# Patient Record
Sex: FEMALE | Race: WHITE | NOT HISPANIC OR LATINO | Employment: PART TIME | URBAN - METROPOLITAN AREA
[De-identification: names, ages, dates, MRNs, and addresses within clinical notes are randomized per-mention and may not be internally consistent; named-entity substitution may affect disease eponyms.]

---

## 2017-03-15 ENCOUNTER — HOSPITAL ENCOUNTER (OUTPATIENT)
Facility: MEDICAL CENTER | Age: 62
End: 2017-03-15
Attending: NURSE PRACTITIONER
Payer: COMMERCIAL

## 2017-03-15 LAB
C DIFF DNA SPEC QL NAA+PROBE: NEGATIVE
C DIFF TOX GENS STL QL NAA+PROBE: NEGATIVE

## 2017-03-15 PROCEDURE — 87045 FECES CULTURE AEROBIC BACT: CPT

## 2017-03-15 PROCEDURE — 87493 C DIFF AMPLIFIED PROBE: CPT

## 2017-03-15 PROCEDURE — 87899 AGENT NOS ASSAY W/OPTIC: CPT

## 2017-03-15 PROCEDURE — 87046 STOOL CULTR AEROBIC BACT EA: CPT

## 2017-03-15 RX ORDER — LEVOTHYROXINE SODIUM 0.12 MG/1
TABLET ORAL
Qty: 90 TAB | Refills: 0 | Status: SHIPPED | OUTPATIENT
Start: 2017-03-15 | End: 2017-06-09 | Stop reason: SDUPTHER

## 2017-03-16 LAB
E COLI SXT1+2 STL IA: NORMAL
SIGNIFICANT IND 70042: NORMAL
SOURCE SOURCE: NORMAL

## 2017-03-18 LAB
BACTERIA STL CULT: NORMAL
E COLI SXT1+2 STL IA: NORMAL
SIGNIFICANT IND 70042: NORMAL
SOURCE SOURCE: NORMAL

## 2017-03-29 ENCOUNTER — TELEPHONE (OUTPATIENT)
Dept: MEDICAL GROUP | Facility: MEDICAL CENTER | Age: 62
End: 2017-03-29

## 2017-03-29 DIAGNOSIS — Z13.21 ENCOUNTER FOR VITAMIN DEFICIENCY SCREENING: ICD-10-CM

## 2017-03-29 DIAGNOSIS — I10 ESSENTIAL HYPERTENSION: Chronic | ICD-10-CM

## 2017-03-29 DIAGNOSIS — E03.9 ACQUIRED HYPOTHYROIDISM: Chronic | ICD-10-CM

## 2017-03-29 DIAGNOSIS — E78.5 DYSLIPIDEMIA: Chronic | ICD-10-CM

## 2017-03-29 NOTE — Clinical Note
April 3, 2017        Elizabeth Nuñez  [address]  [facility] [address]        Dear Elizabeth:    Andie Carmichael's office has tried contacting you. At your earliest convenience please contact our office at [phone].      If you have any questions or concerns, please don't hesitate to call.        Sincerely,        Andie Carmichael, AMAURY.P.KOBI.    Electronically Signed

## 2017-03-29 NOTE — TELEPHONE ENCOUNTER
He didn't say that from his note in jan.  Has she seen him more recently?  Zocor doesn't usually effect colitis that i am aware of?  Was she having liver issues?

## 2017-03-29 NOTE — TELEPHONE ENCOUNTER
1. Caller Name: Pt                      Call Back Number: [phone] (home)     2. Message: Pt left message stating she was diagnosed with Lymphocytic Colitis and was told by GI doctor to discontinue Zocor. Is there something else she can take? Should she make an appt?      3. Patient approves office to leave a detailed voicemail/MyChart message: N\A

## 2017-03-30 NOTE — TELEPHONE ENCOUNTER
agev pt info-pt states she had colonoscopy done 3- and she saw dr. higginbotham at [facility] and he indicated that this was one medication that could cause colitis

## 2017-06-09 RX ORDER — LEVOTHYROXINE SODIUM 0.12 MG/1
TABLET ORAL
Qty: 30 TAB | Refills: 0 | Status: SHIPPED | OUTPATIENT
Start: 2017-06-09 | End: 2017-07-13

## 2017-06-09 NOTE — Clinical Note
June 12, 2017        Elizabeth Nuñez  [address]  [facility] [address]        Dear Elizabeth:    We have received a request from your pharmacy to refill your prescription(s). After careful review of your chart, we have noted you are due for labs and a follow up appointment.  We request you call our office at [phone] at your earliest convenience and make an appointment. I have included a fasting lab order for you.    However, when patients are not followed closely by their physician, potential medication complications may go unadressed. We look forward to scheduling an appointment for you, so that we may provide you with the safest and most complete medical care.        If you have any questions or concerns, please don't hesitate to call.        Sincerely,        RHEA Cruz.    Electronically Signed

## 2017-07-13 ENCOUNTER — OFFICE VISIT (OUTPATIENT)
Dept: MEDICAL GROUP | Facility: MEDICAL CENTER | Age: 62
End: 2017-07-13
Payer: COMMERCIAL

## 2017-07-13 VITALS
SYSTOLIC BLOOD PRESSURE: 120 MMHG | DIASTOLIC BLOOD PRESSURE: 78 MMHG | BODY MASS INDEX: 36.99 KG/M2 | TEMPERATURE: 96.5 F | WEIGHT: 222 LBS | HEIGHT: 65 IN | OXYGEN SATURATION: 96 % | HEART RATE: 74 BPM

## 2017-07-13 DIAGNOSIS — Z00.00 PHYSICAL EXAM, ANNUAL: ICD-10-CM

## 2017-07-13 DIAGNOSIS — E66.9 OBESITY (BMI 30-39.9): ICD-10-CM

## 2017-07-13 DIAGNOSIS — E78.5 HYPERLIPIDEMIA LDL GOAL <100: ICD-10-CM

## 2017-07-13 DIAGNOSIS — B02.8 HERPES ZOSTER WITH COMPLICATION: ICD-10-CM

## 2017-07-13 PROCEDURE — 99396 PREV VISIT EST AGE 40-64: CPT | Performed by: NURSE PRACTITIONER

## 2017-07-13 RX ORDER — ACYCLOVIR 400 MG/1
400 TABLET ORAL 2 TIMES DAILY
Qty: 180 TAB | Refills: 1 | Status: SHIPPED | OUTPATIENT
Start: 2017-07-13 | End: 2018-01-15 | Stop reason: SDUPTHER

## 2017-07-13 RX ORDER — CHOLESTYRAMINE LIGHT 4 G/5.7G
POWDER, FOR SUSPENSION ORAL 2 TIMES DAILY
COMMUNITY
End: 2017-07-19

## 2017-07-13 RX ORDER — CHOLESTYRAMINE 4 G/9G
1 POWDER, FOR SUSPENSION ORAL
Qty: 30 EACH | Refills: 0
Start: 2017-07-13 | End: 2017-11-29

## 2017-07-13 ASSESSMENT — PATIENT HEALTH QUESTIONNAIRE - PHQ9: CLINICAL INTERPRETATION OF PHQ2 SCORE: 0

## 2017-07-13 NOTE — PROGRESS NOTES
Subjective:      Elizabeth Nuñez is a 62 y.o. female who presents with No chief complaint on file.            HPI  Seen in f/u for PE.  She was having diarrhea.  Saw [facility] and had colonoscopy in march.  Was dx with lymphcytic microscopic colitis.  She was taken off zocor and change to cholestyramine.  She is doing cholestyramine 3x/wk.  Also was stopped HRT and ibuprofen.  She did lab but we dont have results. She did them on 6/29/17 but no lab has them.    She is due a mammo in nov.  Will call for order in oct.    She has had a syncope in past.  Went to ENT.  They thought it was a viral infeciton.  Pt thought it was shingles.  She had lesions on rt ear.  Went to neuro and they conferred shingles.  Told her to have vaccine.  She hasn't been able to get it d/t continuously reoccurring rash from shingles.  That is causing tinnitus and hearing loss norma.       Patient Active Problem List    Diagnosis Date Noted   • Obesity (BMI 30-39.9) 07/13/2017   • Allergy to pollen    • Hypertension 02/22/2012   • Syncope 05/20/2010   • Hypothyroid 04/22/2009   • Pulmonary nodule 04/22/2009   • Dyslipidemia 04/22/2009   • Pruritic dermatitis 04/22/2009   • History of diverticulitis of colon 04/22/2009   • Carpal tunnel syndrome 04/22/2009   • Adenomatous polyp of colon 04/22/2009   • Preventative health care 04/22/2009   • GERD (gastroesophageal reflux disease) 04/22/2009   • Diverticulitis 04/22/2009     Current Outpatient Prescriptions   Medication Sig Dispense Refill   • cholestyramine (QUESTRAN,PREVALITE) 4 GM Pack Take  by mouth 2 times a day.     • simvastatin (ZOCOR) 10 MG Tab Take 1 Tab by mouth every bedtime. TAKE 1 TAB BY MOUTH EVERY EVENING. 90 Tab 3   • lisinopril (PRINIVIL) 10 MG Tab Take 1 Tab by mouth 2 times a day. TAKE 1 TAB BY MOUTH 2 TIMES A DAY. 180 Tab 3   • estradiol (ESTRACE) 0.1 MG/GM vaginal cream Insert 0.1 g in vagina Every Sunday and Wednesday. 1 Tube 0   • Omega-3 Fatty Acids (FISH OIL) 1000 MG Cap  capsule Take 1 Cap by mouth every day. 30 Cap 0   • Probiotic Product (PROBIOTIC DAILY) Cap Take 1 Cap by mouth every day. 30 Cap 0   • B Complex Cap Take 1 Cap by mouth every day. 30 Cap 0   • levothyroxine (SYNTHROID) 125 MCG TABS Take 1 Tab by mouth every day. 90 Tab 1   • Cholecalciferol (VITAMIN D) 1000 UNIT CAPS Take 2 Caps by mouth every day.     • Psyllium (METAMUCIL) 0.52 GM CAPS Take 1 Cap by mouth every day.     • ibuprofen (MOTRIN) 200 MG TABS Take 600 mg by mouth every 6 hours as needed for Mild Pain.     • Calcium Carbonate (CALCIUM 500 PO) Take 2 Tabs by mouth every day.     • OMEPRAZOLE 20 MG PO CPDR Take 1 Cap by mouth every day.  11     No current facility-administered medications for this visit.     Allergies   Allergen Reactions   • Morphine Itching   • Sulfa Drugs    • Sulfa Drugs Itching       ROS  Review of Systems   Constitutional: Negative.  Negative for fever, chills, weight loss, malaise/fatigue and diaphoresis.   HENT: Negative.  Negative for congestion, sore throat, neck pain, ear discharge.    Eyes: Negative.  Negative for blurred vision, double vision, photophobia, pain, discharge and redness.   Respiratory: Negative.  Negative for cough, hemoptysis, sputum production, shortness of breath, wheezing and stridor.    Cardiovascular: Negative.  Negative for chest pain, palpitations, orthopnea, claudication, leg swelling and PND.   Gastrointestinal: Negative.  Negative for vomiting, abdominal pain, constipation, blood in stool and melena.   Genitourinary: Negative.  Negative for dysuria, urgency, incontinence, hematuria and flank pain.  chronic urinary freq  Musculoskeletal: Negative.  Negative for myalgias, back pain, joint pain and falls.    Neurological: Negative.  Negative for dizziness, tingling, tremors, sensory change, speech change, focal weakness, seizures, loss of consciousness, weakness and headaches.   Endo/Heme/Allergies: Negative.  Negative for polydipsia. Does not  "bruise/bleed easily.   Psychiatric/Behavioral: Negative.  Negative for depression, suicidal ideas, hallucinations, memory loss and substance abuse. The patient is not nervous/anxious and does not have insomnia.    All other systems reviewed and are negative.           Objective:     /78 mmHg  Pulse 74  Temp(Src) 35.8 °C (96.5 °F)  Ht 1.651 m (5' 5\")  Wt 100.699 kg (222 lb)  BMI 36.94 kg/m2  SpO2 96%  LMP 08/11/2010  Breastfeeding? No     Physical Exam  Physical Exam   Vitals reviewed.  Constitutional: oriented to person, place, and time. appears well-developed and well-nourished. No distress.   HENT: Head: Normocephalic and atraumatic. Bilateral tympanic membranes wnl w/o bulging.  Right Ear: External ear normal. Left Ear: External ear normal. Nose: Nose normal.  Mouth/Throat: Oropharynx is clear and moist. No oropharyngeal exudate. norma tm wnl. Eyes: Conjunctivae and EOM are normal. Pupils are equal, round, and reactive to light. Right eye exhibits no discharge. Left eye exhibits no discharge. No scleral icterus.    Neck: Normal range of motion. Neck supple. No JVD present.   Cardiovascular: Normal rate, regular rhythm, normal heart sounds and intact distal pulses.  Exam reveals no gallop and no friction rub.  No murmur heard.  No carotid bruits   Pulmonary/Chest: Effort normal and breath sounds normal. No stridor. No respiratory distress. no wheezes or rales. exhibits no tenderness.   Abdominal: Soft. Bowel sounds are normal. exhibits no distension and no mass. No tenderness. no rebound and no guarding.   Musculoskeletal: Normal range of motion. exhibits no edema or tenderness.  norma pedal pulses 2+.  Lymphadenopathy:  no cervical or supraclavicular adenopathy.   Neurological: alert and oriented to person, place, and time. has normal reflexes. displays normal reflexes. No cranial nerve deficit. exhibits normal muscle tone. Coordination normal.   Skin: Skin is warm and dry. No rash noted. no " diaphoresis. No erythema. No pallor.   Psychiatric: normal mood and affect. behavior is normal.               Assessment/Plan:     1. Physical exam, annual     2. Hyperlipidemia LDL goal <100  cholestyramine (QUESTRAN) 4 G packet    off zocor.  will consider restart another med but needs to do lab again since it was lost.  recheck lab and f/u with pt with results.  needs seen if abn   3. Herpes zoster with complication  acyclovir (ZOVIRAX) 400 MG tablet    take acyclovir bid for 3 months.  see if can get sx controlled then do zostavax.  otherwise f/u yrly   4. Obesity (BMI 30-39.9)  Patient identified as having weight management issue.  Appropriate orders and counseling given.

## 2017-07-13 NOTE — MR AVS SNAPSHOT
"        Elizabeth Solomon Joaquin   2017 10:00 AM   Office Visit   MRN: [id number]    Department:  [facility]   Dept Phone:  [phone]    Description:  Female : 1955   Provider:  BASILIO Cruz           Allergies as of 2017     Allergen Noted Reactions    Morphine 2009   Itching    Sulfa Drugs 2009       Sulfa Drugs 10/07/2009   Itching      You were diagnosed with     Physical exam, annual   [938697]       Obesity (BMI 30-39.9)   [992806]       Hyperlipidemia LDL goal <100   [201667]   off zocor.  will consider restart another med but needs to do lab again since it was lost.  recheck lab and f/u 1 month.     Herpes zoster with complication   [494532]   take acyclovir bid for 3 months.  see if can get sx controlled then do zostavax      Vital Signs     Blood Pressure Pulse Temperature Height Weight Body Mass Index    120/78 mmHg 74 35.8 °C (96.5 °F) 1.651 m (5' 5\") 100.699 kg (222 lb) 36.94 kg/m2    Oxygen Saturation Last Menstrual Period Breastfeeding? Smoking Status          96% 2010 No Never Smoker         Basic Information     Date Of Birth Sex Race Ethnicity Preferred Language    1955 Female White Non- English      Problem List              ICD-10-CM Priority Class Noted - Resolved    Hypothyroid (Chronic) E03.9   2009 - Present    Pulmonary nodule (Chronic) R91.1   2009 - Present    Dyslipidemia (Chronic) E78.5   2009 - Present    Pruritic dermatitis (Chronic) L29.9   2009 - Present    History of diverticulitis of colon (Chronic) Z87.19   2009 - Present    Carpal tunnel syndrome (Chronic) G56.00   2009 - Present    Adenomatous polyp of colon (Chronic) D12.6   2009 - Present    Preventative health care (Chronic) Z00.00   2009 - Present    GERD (gastroesophageal reflux disease) (Chronic) K21.9   2009 - Present    Syncope (Chronic) R55   2010 - Present    Hypertension (Chronic) I10   2012 - " Present    Allergy to pollen J30.1   Unknown - Present    Diverticulitis K57.92   4/22/2009 - Present    Obesity (BMI 30-39.9) E66.9   7/13/2017 - Present      Health Maintenance        Date Due Completion Dates    IMM ZOSTER VACCINE 6/14/2016 ---    COLONOSCOPY 2/28/2017 2/28/2012 (Done)    Override on 2/28/2012: Done    IMM INFLUENZA (1) 9/1/2017 9/28/2016, 10/15/2012, 10/20/2011    MAMMOGRAM 11/9/2017 11/9/2016, 10/27/2015, 10/22/2014, 11/1/2013, 10/23/2012, 10/20/2011, 9/30/2010, 4/20/2009, 4/20/2009, 4/18/2008, 4/18/2008, 4/5/2007, 3/9/2006, 1/4/2005    IMM DTaP/Tdap/Td Vaccine (2 - Td) 7/13/2019 7/13/2009            Current Immunizations     Influenza TIV (IM) 10/15/2012, 10/20/2011 10:44 AM    Influenza Vaccine Quad Inj (Pf) 9/28/2016    Tdap Vaccine 7/13/2009 11:10 AM    Tuberculin Skin Test 3/13/2013  6:30 AM, 3/19/2012  6:30 AM, 3/30/2011  6:39 AM      Below and/or attached are the medications your provider expects you to take. Review all of your home medications and newly ordered medications with your provider and/or pharmacist. Follow medication instructions as directed by your provider and/or pharmacist. Please keep your medication list with you and share with your provider. Update the information when medications are discontinued, doses are changed, or new medications (including over-the-counter products) are added; and carry medication information at all times in the event of emergency situations     Allergies:  MORPHINE - Itching     SULFA DRUGS - (reactions not documented)     SULFA DRUGS - Itching               Medications  Valid as of: July 13, 2017 - 11:03 AM    Generic Name Brand Name Tablet Size Instructions for use    Acyclovir (Tab) ZOVIRAX 400 MG Take 1 Tab by mouth 2 times a day.        B Complex Vitamins (Cap) B Complex  Take 1 Cap by mouth every day.        Calcium-Magnesium-Vitamin D   Take 2 Tabs by mouth every day.        Cholecalciferol (Cap) Vitamin D 1000 UNIT Take 2 Caps by mouth  every day.        Cholestyramine (Pack) QUESTRAN 4 G Take 4 g by mouth every 24 hours as needed.        Cholestyramine Light (Pack) QUESTRAN,PREVALITE 4 GM Take  by mouth 2 times a day.        Levothyroxine Sodium (Tab) SYNTHROID 125 MCG Take 1 Tab by mouth every day.        Lisinopril (Tab) PRINIVIL 10 MG Take 1 Tab by mouth 2 times a day. TAKE 1 TAB BY MOUTH 2 TIMES A DAY.        Omega-3 Fatty Acids (Cap) fish oil 1000 MG Take 1 Cap by mouth every day.        Omeprazole (CAPSULE DELAYED RELEASE) PRILOSEC 20 MG Take 1 Cap by mouth every day.        Psyllium (Cap) Psyllium 0.52 GM Take 1 Cap by mouth every day.        .                 Medicines prescribed today were sent to:     [facility]ARM[facility]Y #[id number] - [place] - [address]    [address] [facility] [address]    Phone: [phone] Fax: [phone]    Open 24 Hours?: No    [facility]ARM[facility]Y #[id number] - [place], [place] - [address]    [address] [facility] [address]    Phone: [phone] Fax: [phone]    Open 24 Hours?: No    [facility]Y #[id number] - [place], [place] - [address]    [address] [address]    Phone: [phone] Fax: [phone]    Open 24 Hours?: No      Medication refill instructions:       If your prescription bottle indicates you have medication refills left, it is not necessary to call your provider’s office. Please contact your pharmacy and they will refill your medication.    If your prescription bottle indicates you do not have any refills left, you may request refills at any time through one of the following ways: The online [facility] system (except Urgent Care), by calling your provider’s office, or by asking your pharmacy to contact your provider’s office with a refill request. Medication refills are processed only during regular business hours and may not be available until the next business day. Your provider may request additional information or to have a follow-up visit with you prior to  refilling your medication.   *Please Note: Medication refills are assigned a new Rx number when refilled electronically. Your pharmacy may indicate that no refills were authorized even though a new prescription for the same medication is available at the pharmacy. Please request the medicine by name with the pharmacy before contacting your provider for a refill.        Other Notes About Your Plan     12/09 last seen by me, sees moreno Rodriguez Access Code: Activation code not generated  Current [facility] Status: Active

## 2017-07-15 RX ORDER — LEVOTHYROXINE SODIUM 0.12 MG/1
TABLET ORAL
Qty: 30 TAB | Refills: 0 | Status: SHIPPED | OUTPATIENT
Start: 2017-07-15 | End: 2017-08-08 | Stop reason: SDUPTHER

## 2017-07-15 NOTE — TELEPHONE ENCOUNTER
Was the patient seen in the last year in this department? Yes     Does patient have an active prescription for medications requested? No     Received Request Via: Pharmacy     Last seen: 07/13/2017 Amol

## 2017-07-18 ENCOUNTER — TELEPHONE (OUTPATIENT)
Dept: MEDICAL GROUP | Facility: MEDICAL CENTER | Age: 62
End: 2017-07-18

## 2017-07-18 LAB
25(OH)D3+25(OH)D2 SERPL-MCNC: 41.5 NG/ML (ref 30–100)
ALBUMIN SERPL-MCNC: 4.2 G/DL (ref 3.6–4.8)
ALBUMIN/CREAT UR: <5.2 MG/G CREAT (ref 0–30)
ALBUMIN/GLOB SERPL: 1.6 {RATIO} (ref 1.2–2.2)
ALP SERPL-CCNC: 89 IU/L (ref 39–117)
ALT SERPL-CCNC: 27 IU/L (ref 0–32)
AST SERPL-CCNC: 21 IU/L (ref 0–40)
BILIRUB SERPL-MCNC: 0.5 MG/DL (ref 0–1.2)
BUN SERPL-MCNC: 15 MG/DL (ref 8–27)
BUN/CREAT SERPL: 22 (ref 12–28)
CALCIUM SERPL-MCNC: 9.3 MG/DL (ref 8.7–10.3)
CHLORIDE SERPL-SCNC: 101 MMOL/L (ref 96–106)
CHOLEST SERPL-MCNC: 209 MG/DL (ref 100–199)
CHOLEST/HDLC SERPL: 3.9 RATIO UNITS (ref 0–4.4)
CO2 SERPL-SCNC: 21 MMOL/L (ref 18–29)
COMMENT 011824: ABNORMAL
CREAT SERPL-MCNC: 0.68 MG/DL (ref 0.57–1)
CREAT UR-MCNC: 57.3 MG/DL
GLOBULIN SER CALC-MCNC: 2.6 G/DL (ref 1.5–4.5)
GLUCOSE SERPL-MCNC: 90 MG/DL (ref 65–99)
HDLC SERPL-MCNC: 54 MG/DL
LDLC SERPL CALC-MCNC: 136 MG/DL (ref 0–99)
MICROALBUMIN UR-MCNC: <3 UG/ML
POTASSIUM SERPL-SCNC: 4.6 MMOL/L (ref 3.5–5.2)
PROT SERPL-MCNC: 6.8 G/DL (ref 6–8.5)
SODIUM SERPL-SCNC: 141 MMOL/L (ref 134–144)
T4 FREE SERPL-MCNC: 1.62 NG/DL (ref 0.82–1.77)
TRIGL SERPL-MCNC: 95 MG/DL (ref 0–149)
TSH SERPL DL<=0.005 MIU/L-ACNC: 1.68 UIU/ML (ref 0.45–4.5)
VLDLC SERPL CALC-MCNC: 19 MG/DL (ref 5–40)

## 2017-07-18 NOTE — TELEPHONE ENCOUNTER
----- Message from BASILIO Cruz sent at 7/18/2017  1:26 PM PDT -----  Please have pt set appointment to review and discuss treatment for labs.

## 2017-07-19 ENCOUNTER — OFFICE VISIT (OUTPATIENT)
Dept: MEDICAL GROUP | Facility: MEDICAL CENTER | Age: 62
End: 2017-07-19
Payer: COMMERCIAL

## 2017-07-19 VITALS
TEMPERATURE: 97.1 F | DIASTOLIC BLOOD PRESSURE: 72 MMHG | HEIGHT: 65 IN | HEART RATE: 63 BPM | OXYGEN SATURATION: 96 % | WEIGHT: 222 LBS | SYSTOLIC BLOOD PRESSURE: 118 MMHG | BODY MASS INDEX: 36.99 KG/M2

## 2017-07-19 DIAGNOSIS — E78.5 HYPERLIPIDEMIA LDL GOAL <100: ICD-10-CM

## 2017-07-19 DIAGNOSIS — I10 ESSENTIAL HYPERTENSION: Chronic | ICD-10-CM

## 2017-07-19 PROCEDURE — 99214 OFFICE O/P EST MOD 30 MIN: CPT | Performed by: NURSE PRACTITIONER

## 2017-07-19 NOTE — PROGRESS NOTES
Subjective:      Elizabeth Nuñez is a 62 y.o. female who presents with No chief complaint on file.            HPI  Seen in f/u for HTN.  Her bp is well cotnrolled.  Stable on meds.  She developed colitis after a colonoscopy.  She used cholestyramine prn to tx sx.  No black tarry stools.  She was on zocor but Mina stopped it.  It could have caused the colitis.  He told her the other statins do not all cause it.  She is using the cholestryamine only as needed.   Reviewed lab with pt. Her CMP, GFR, D, alb/cr ratio, TSH, T4 is wnl  LP shows trg and HDL are at goal.  LDL is up from 104 to 136.  She doesn't eat many fatty foods.  She was in [place] and ate a lot of fried foods.  She was there for a month.  That was in june.        Patient Active Problem List    Diagnosis Date Noted   • Obesity (BMI 30-39.9) 07/13/2017   • Allergy to pollen    • Hypertension 02/22/2012   • Syncope 05/20/2010   • Hypothyroid 04/22/2009   • Pulmonary nodule 04/22/2009   • Dyslipidemia 04/22/2009   • Pruritic dermatitis 04/22/2009   • History of diverticulitis of colon 04/22/2009   • Carpal tunnel syndrome 04/22/2009   • Adenomatous polyp of colon 04/22/2009   • Preventative health care 04/22/2009   • GERD (gastroesophageal reflux disease) 04/22/2009   • Diverticulitis 04/22/2009     Current Outpatient Prescriptions   Medication Sig Dispense Refill   • levothyroxine (SYNTHROID) 125 MCG Tab TAKE 1 TABLET BY MOUTH EVERY DAY 30 Tab 0   • cholestyramine (QUESTRAN) 4 G packet Take 4 g by mouth every 24 hours as needed. 30 Each 0   • acyclovir (ZOVIRAX) 400 MG tablet Take 1 Tab by mouth 2 times a day. 180 Tab 1   • lisinopril (PRINIVIL) 10 MG Tab Take 1 Tab by mouth 2 times a day. TAKE 1 TAB BY MOUTH 2 TIMES A DAY. 180 Tab 3   • Omega-3 Fatty Acids (FISH OIL) 1000 MG Cap capsule Take 1 Cap by mouth every day. 30 Cap 0   • B Complex Cap Take 1 Cap by mouth every day. 30 Cap 0   • Cholecalciferol (VITAMIN D) 1000 UNIT CAPS Take 2 Caps by  "mouth every day.     • Psyllium (METAMUCIL) 0.52 GM CAPS Take 1 Cap by mouth every day.     • Calcium Carbonate (CALCIUM 500 PO) Take 2 Tabs by mouth every day.     • OMEPRAZOLE 20 MG PO CPDR Take 1 Cap by mouth every day.  11     No current facility-administered medications for this visit.     Allergies   Allergen Reactions   • Morphine Itching   • Sulfa Drugs    • Sulfa Drugs Itching       ROS  Review of Systems   Constitutional: Negative.  Negative for fever, chills, weight loss, malaise/fatigue and diaphoresis.   HENT: Negative.  Negative for hearing loss, ear pain, nosebleeds, congestion, sore throat, neck pain, tinnitus and ear discharge.    Respiratory: Negative.  Negative for cough, hemoptysis, sputum production, shortness of breath, wheezing and stridor.    Cardiovascular: Negative.  Negative for chest pain, palpitations, orthopnea, claudication, leg swelling and PND.   Gastrointestinal: denies nausea, vomiting, diarrhea, constipation, heartburn, melena or hematochezia.  Genitourinary: Denies dysuria, hematuria, urinary incontinence, frequency or urgency.             Objective:     /72 mmHg  Pulse 63  Temp(Src) 36.2 °C (97.1 °F)  Ht 1.651 m (5' 5\")  Wt 100.699 kg (222 lb)  BMI 36.94 kg/m2  SpO2 96%  LMP 08/11/2010  Breastfeeding? No     Physical Exam      Physical Exam   Vitals reviewed.  Constitutional: oriented to person, place, and time. appears well-developed and well-nourished. No distress.   Cardiovascular: Normal rate, regular rhythm, normal heart sounds and intact distal pulses.  Exam reveals no gallop and no friction rub.  No murmur heard.  No carotid bruits.   Pulmonary/Chest: Effort normal and breath sounds normal. No stridor. No respiratory distress. no wheezes or rales. exhibits no tenderness.   Musculoskeletal: Normal range of motion. exhibits no edema. norma pedal pulses 2+.  Neurological: alert and oriented to person, place, and time. exhibits normal muscle tone. Coordination " normal.   Skin: Skin is warm and dry. no diaphoresis.   Psychiatric: normal mood and affect. behavior is normal.            Assessment/Plan:       1. Essential hypertension      stable and controlled on med.  f/u yearly call for lab slip   2. Hyperlipidemia LDL goal <100      try taking cholestyramine daily or QOD.  recheck LP in 3 months. f/u with pt with results.  will only consider restart another statin if cholestyramine not work

## 2017-07-19 NOTE — MR AVS SNAPSHOT
"        Elizabeth Nuñez   2017 8:30 AM   Office Visit   MRN: [id number]    Department:  [facility]   Dept Phone:  [phone]    Description:  Female : 1955   Provider:  BASILIO Cruz           Allergies as of 2017     Allergen Noted Reactions    Morphine 2009   Itching    Sulfa Drugs 2009       Sulfa Drugs 10/07/2009   Itching      You were diagnosed with     Essential hypertension   [7359148]   stable and controlled on med.  f/u yearly call for lab slip    Hyperlipidemia LDL goal <100   [[id number]]   try taking cholestyramine daily or QOD.  recheck LP in 3 months. f/u with pt with results.  will only consider restart another statin if cholestyramine not work      Vital Signs     Blood Pressure Pulse Temperature Height Weight Body Mass Index    118/72 mmHg 63 36.2 °C (97.1 °F) 1.651 m (5' 5\") 100.699 kg (222 lb) 36.94 kg/m2    Oxygen Saturation Last Menstrual Period Breastfeeding? Smoking Status          96% 2010 No Never Smoker         Basic Information     Date Of Birth Sex Race Ethnicity Preferred Language    1955 Female White Non- English      Your appointments     2017  8:45 AM   Established Patient with BASILIO Cruz   [facility] ([facility])    [address]  [facility] [address]   [phone]           You will be receiving a confirmation call a few days before your appointment from our automated call confirmation system.              Problem List              ICD-10-CM Priority Class Noted - Resolved    Hypothyroid (Chronic) E03.9   2009 - Present    Pulmonary nodule (Chronic) R91.1   2009 - Present    Dyslipidemia (Chronic) E78.5   2009 - Present    Pruritic dermatitis (Chronic) L29.9   2009 - Present    History of diverticulitis of colon (Chronic) Z87.19   2009 - Present    Carpal tunnel syndrome (Chronic) G56.00   2009 - Present    Adenomatous polyp " of colon (Chronic) D12.6   4/22/2009 - Present    Preventative health care (Chronic) Z00.00   4/22/2009 - Present    GERD (gastroesophageal reflux disease) (Chronic) K21.9   4/22/2009 - Present    Syncope (Chronic) R55   5/20/2010 - Present    Hypertension (Chronic) I10   2/22/2012 - Present    Allergy to pollen J30.1   Unknown - Present    Diverticulitis K57.92   4/22/2009 - Present    Obesity (BMI 30-39.9) E66.9   7/13/2017 - Present      Health Maintenance        Date Due Completion Dates    IMM ZOSTER VACCINE 6/14/2016 ---    IMM INFLUENZA (1) 9/1/2017 9/28/2016, 10/15/2012, 10/20/2011    MAMMOGRAM 11/9/2017 11/9/2016, 10/27/2015, 10/22/2014, 11/1/2013, 10/23/2012, 10/20/2011, 9/30/2010, 4/20/2009, 4/20/2009, 4/18/2008, 4/18/2008, 4/5/2007, 3/9/2006, 1/4/2005    COLONOSCOPY 3/15/2022 3/15/2017 (Done), 2/28/2012 (Done)    Override on 3/15/2017: Done    Override on 2/28/2012: Done    IMM DTaP/Tdap/Td Vaccine (3 - Td) 6/21/2027 6/21/2017, 7/13/2009            Current Immunizations     Influenza TIV (IM) 10/15/2012, 10/20/2011 10:44 AM    Influenza Vaccine Quad Inj (Pf) 9/28/2016    Tdap Vaccine 6/21/2017, 7/13/2009 11:10 AM    Tuberculin Skin Test 3/13/2013  6:30 AM, 3/19/2012  6:30 AM, 3/30/2011  6:39 AM      Below and/or attached are the medications your provider expects you to take. Review all of your home medications and newly ordered medications with your provider and/or pharmacist. Follow medication instructions as directed by your provider and/or pharmacist. Please keep your medication list with you and share with your provider. Update the information when medications are discontinued, doses are changed, or new medications (including over-the-counter products) are added; and carry medication information at all times in the event of emergency situations     Allergies:  MORPHINE - Itching     SULFA DRUGS - (reactions not documented)     SULFA DRUGS - Itching               Medications  Valid as of: July 19,  2017 -  8:59 AM    Generic Name Brand Name Tablet Size Instructions for use    Acyclovir (Tab) ZOVIRAX 400 MG Take 1 Tab by mouth 2 times a day.        B Complex Vitamins (Cap) B Complex  Take 1 Cap by mouth every day.        Calcium-Magnesium-Vitamin D   Take 2 Tabs by mouth every day.        Cholecalciferol (Cap) Vitamin D 1000 UNIT Take 2 Caps by mouth every day.        Cholestyramine (Pack) QUESTRAN 4 G Take 4 g by mouth every 24 hours as needed.        Levothyroxine Sodium (Tab) SYNTHROID 125 MCG TAKE 1 TABLET BY MOUTH EVERY DAY        Lisinopril (Tab) PRINIVIL 10 MG Take 1 Tab by mouth 2 times a day. TAKE 1 TAB BY MOUTH 2 TIMES A DAY.        Omega-3 Fatty Acids (Cap) fish oil 1000 MG Take 1 Cap by mouth every day.        Omeprazole (CAPSULE DELAYED RELEASE) PRILOSEC 20 MG Take 1 Cap by mouth every day.        Psyllium (Cap) Psyllium 0.52 GM Take 1 Cap by mouth every day.        .                 Medicines prescribed today were sent to:     [facility]/PHARMACY #[id number] - [place] - [address]    [address] [facility] [place] [facility] [address]    Phone: [phone] Fax: [phone]    Open 24 Hours?: No    [facility]/PHARMACY #[id number] - [place], [place] - [address]    [address] [facility] [address]    Phone: [phone] Fax: [phone]    Open 24 Hours?: No    [facility]/PHARMACY #[id number] - [place], [place] - [address] [facility] [address] [address]    [address] [facility] [address]    Phone: [phone] Fax: [phone]    Open 24 Hours?: No      Medication refill instructions:       If your prescription bottle indicates you have medication refills left, it is not necessary to call your provider’s office. Please contact your pharmacy and they will refill your medication.    If your prescription bottle indicates you do not have any refills left, you may request refills at any time through one of the following ways: The online mokono system (except Urgent Care), by calling your provider’s office, or by asking your pharmacy to  contact your provider’s office with a refill request. Medication refills are processed only during regular business hours and may not be available until the next business day. Your provider may request additional information or to have a follow-up visit with you prior to refilling your medication.   *Please Note: Medication refills are assigned a new Rx number when refilled electronically. Your pharmacy may indicate that no refills were authorized even though a new prescription for the same medication is available at the pharmacy. Please request the medicine by name with the pharmacy before contacting your provider for a refill.        Other Notes About Your Plan     12/09 last seen by me, seearlene Rodriguez Access Code: Activation code not generated  Current [employer] Status: Active

## 2017-08-08 RX ORDER — LEVOTHYROXINE SODIUM 0.12 MG/1
125 TABLET ORAL
Qty: 90 TAB | Refills: 3 | Status: SHIPPED | OUTPATIENT
Start: 2017-08-08 | End: 2017-10-12 | Stop reason: SDUPTHER

## 2017-08-14 RX ORDER — SIMVASTATIN 10 MG
TABLET ORAL
Qty: 90 TAB | Refills: 2 | Status: SHIPPED | OUTPATIENT
Start: 2017-08-14 | End: 2017-11-08

## 2017-08-15 RX ORDER — LEVOTHYROXINE SODIUM 0.12 MG/1
TABLET ORAL
Refills: 0 | OUTPATIENT
Start: 2017-08-15

## 2017-10-07 DIAGNOSIS — I10 ESSENTIAL HYPERTENSION: ICD-10-CM

## 2017-10-08 RX ORDER — LISINOPRIL 10 MG/1
TABLET ORAL
Qty: 180 TAB | Refills: 2 | Status: SHIPPED | OUTPATIENT
Start: 2017-10-08 | End: 2017-11-08

## 2017-10-12 RX ORDER — LEVOTHYROXINE SODIUM 0.12 MG/1
125 TABLET ORAL
Qty: 90 TAB | Refills: 3 | Status: SHIPPED | OUTPATIENT
Start: 2017-10-12 | End: 2018-02-21 | Stop reason: SDUPTHER

## 2017-10-16 ENCOUNTER — TELEPHONE (OUTPATIENT)
Dept: MEDICAL GROUP | Facility: MEDICAL CENTER | Age: 62
End: 2017-10-16

## 2017-10-16 NOTE — TELEPHONE ENCOUNTER
1. Caller Name: Pt                      Call Back Number: [phone] (home)     2. Message: [employer] [employer] needs to have office visit from 7/13 resent to her insurance to verify she had annual appt done. Unable to check status of claim through insurance without the Claim number.    Left message for pt to call back to see if she has claim number.    [facility] ph: [phone]    3. Patient approves office to leave a detailed voicemail/MyChart message: N\A

## 2017-10-16 NOTE — TELEPHONE ENCOUNTER
Spoke with billing and visit on 7/13/17 was billed as preventative. Information given to pt so she can check with insurance.

## 2017-10-25 ENCOUNTER — TELEPHONE (OUTPATIENT)
Dept: MEDICAL GROUP | Facility: MEDICAL CENTER | Age: 62
End: 2017-10-25

## 2017-10-25 LAB
CHOLEST SERPL-MCNC: 207 MG/DL (ref 100–199)
CHOLEST/HDLC SERPL: 4.5 RATIO UNITS (ref 0–4.4)
COMMENT 011824: ABNORMAL
HDLC SERPL-MCNC: 46 MG/DL
LDLC SERPL CALC-MCNC: 141 MG/DL (ref 0–99)
TRIGL SERPL-MCNC: 98 MG/DL (ref 0–149)
VLDLC SERPL CALC-MCNC: 20 MG/DL (ref 5–40)

## 2017-10-25 NOTE — LETTER
October 25, 2017        Elizabeth Nuñez  [address]  [facility] [address]        Dear Elizabeth:     After careful review of your chart, we have noted you are due for a follow up appointment.  We request you call our office at [phone] at your earliest convenience and make an appointment.     We look forward to scheduling an appointment for you, so that we may provide you with the safest and most complete medical care.        If you have any questions or concerns, please don't hesitate to call.        Sincerely,        RHEA Carrera.    Electronically Signed

## 2017-10-25 NOTE — TELEPHONE ENCOUNTER
----- Message from BASILIO Carrera sent at 10/25/2017  7:40 AM PDT -----  Please have pt set appointment to review and discuss treatment for LP since her LDL is not at goal.

## 2017-11-08 ENCOUNTER — OFFICE VISIT (OUTPATIENT)
Dept: MEDICAL GROUP | Facility: MEDICAL CENTER | Age: 62
End: 2017-11-08
Payer: COMMERCIAL

## 2017-11-08 VITALS
HEART RATE: 64 BPM | DIASTOLIC BLOOD PRESSURE: 74 MMHG | WEIGHT: 225 LBS | TEMPERATURE: 97.2 F | BODY MASS INDEX: 37.49 KG/M2 | SYSTOLIC BLOOD PRESSURE: 122 MMHG | OXYGEN SATURATION: 93 % | HEIGHT: 65 IN

## 2017-11-08 DIAGNOSIS — E78.5 HYPERLIPIDEMIA LDL GOAL <100: ICD-10-CM

## 2017-11-08 DIAGNOSIS — B02.9 HERPES ZOSTER WITHOUT COMPLICATION: ICD-10-CM

## 2017-11-08 PROCEDURE — 99214 OFFICE O/P EST MOD 30 MIN: CPT | Performed by: NURSE PRACTITIONER

## 2017-11-08 RX ORDER — ATORVASTATIN CALCIUM 10 MG/1
10 TABLET, FILM COATED ORAL
Qty: 12 TAB | Refills: 2 | Status: SHIPPED | OUTPATIENT
Start: 2017-11-08 | End: 2017-12-18 | Stop reason: SDUPTHER

## 2017-11-08 NOTE — PROGRESS NOTES
Subjective:     Elizabeth Nuñez is a 62 y.o. female who presents with   Chief Complaint   Patient presents with   • Results   .    HPI:   Seen in f/u for HSV.  She needs presc for shingles vaccine.  She is on acyclovir for shingles.  She has had numerous episodes.  She has seen a neuro for it.  She has had it in her ear and eye.  She is at risk for encephalitis.  Neuro recommended getting the shot.    She had to stop her zoocor since it poss was a cause of the colitis.  She is off all statin now.  Reviewed LP wiht pt.  Her trg and HDL are at goal.  Her LDL is up from 135 to 141.  Goal is <100.    She is getting leg cramping at nite and after working.      Patient Active Problem List    Diagnosis Date Noted   • Obesity (BMI 30-39.9) 07/13/2017   • Allergy to pollen    • Hypertension 02/22/2012   • Syncope 05/20/2010   • Hypothyroid 04/22/2009   • Pulmonary nodule 04/22/2009   • Dyslipidemia 04/22/2009   • Pruritic dermatitis 04/22/2009   • History of diverticulitis of colon 04/22/2009   • Carpal tunnel syndrome 04/22/2009   • Adenomatous polyp of colon 04/22/2009   • Preventative health care 04/22/2009   • GERD (gastroesophageal reflux disease) 04/22/2009   • Diverticulitis 04/22/2009       Current medicines (including changes today)  Current Outpatient Prescriptions   Medication Sig Dispense Refill   • atorvastatin (LIPITOR) 10 MG Tab Take 1 Tab by mouth every 48 hours. 12 Tab 2   • Zoster Vaccine Live (ZOSTAVAX) 12097 UNT/0.65ML Recon Susp Inject 0.65 mL as instructed Once for 1 dose. 0.65 mL 0   • levothyroxine (SYNTHROID) 125 MCG Tab Take 1 Tab by mouth every day. 90 Tab 3   • cholestyramine (QUESTRAN) 4 G packet Take 4 g by mouth every 24 hours as needed. 30 Each 0   • acyclovir (ZOVIRAX) 400 MG tablet Take 1 Tab by mouth 2 times a day. 180 Tab 1   • lisinopril (PRINIVIL) 10 MG Tab Take 1 Tab by mouth 2 times a day. TAKE 1 TAB BY MOUTH 2 TIMES A DAY. 180 Tab 3   • Omega-3 Fatty Acids (FISH OIL) 1000 MG  "Cap capsule Take 1 Cap by mouth every day. 30 Cap 0   • B Complex Cap Take 1 Cap by mouth every day. 30 Cap 0   • Cholecalciferol (VITAMIN D) 1000 UNIT CAPS Take 2 Caps by mouth every day.     • Psyllium (METAMUCIL) 0.52 GM CAPS Take 1 Cap by mouth every day.     • Calcium Carbonate (CALCIUM 500 PO) Take 2 Tabs by mouth every day.     • OMEPRAZOLE 20 MG PO CPDR Take 1 Cap by mouth every day.  11     No current facility-administered medications for this visit.        Allergies   Allergen Reactions   • Morphine Itching   • Sulfa Drugs    • Sulfa Drugs Itching       ROS  Constitutional: Negative. Negative for fever, chills, weight loss, malaise/fatigue and diaphoresis.   HENT: Negative. Negative for hearing loss, ear pain, nosebleeds, congestion, sore throat, neck pain, tinnitus and ear discharge.   Respiratory: Negative. Negative for cough, hemoptysis, sputum production, shortness of breath, wheezing and stridor.   Cardiovascular: Negative. Negative for chest pain, palpitations, orthopnea, claudication, leg swelling and PND.   Gastrointestinal: Denies nausea, vomiting, diarrhea, constipation, heartburn, melena or hematochezia.  Genitourinary: Denies dysuria, hematuria, urinary incontinence, frequency or urgency.        Objective:     Blood pressure 122/74, pulse 64, temperature 36.2 °C (97.2 °F), height 1.651 m (5' 5\"), weight 102.1 kg (225 lb), last menstrual period 08/11/2010, SpO2 93 %, not currently breastfeeding. Body mass index is 37.44 kg/m².    Physical Exam:  Vitals reviewed.  Constitutional: Oriented to person, place, and time. appears well-developed and well-nourished. No distress.   Cardiovascular: Normal rate, regular rhythm, normal heart sounds and intact distal pulses. Exam reveals no gallop and no friction rub. No murmur heard. No carotid bruits.   Pulmonary/Chest: Effort normal and breath sounds normal. No stridor. No respiratory distress. no wheezes or rales. exhibits no tenderness. "   Musculoskeletal: Normal range of motion. exhibits no edema. norma pedal pulses 2+.  Neurological: Alert and oriented to person, place, and time. exhibits normal muscle tone.  Skin: Skin is warm and dry. No diaphoresis.   Psychiatric: Normal mood and affect. Behavior is normal.      Assessment and Plan:     The following treatment plan was discussed:    1. Hyperlipidemia LDL goal <100  atorvastatin (LIPITOR) 10 MG Tab    CMP14+LP    try lipitor 10 mg 3x/wk.  recheck CMP, LP in 6 weeks.  if wnl will f/u 7/18.  call for lab slip.  if lab not at goal will need to f/u.    2. Herpes zoster without complication  Zoster Vaccine Live (ZOSTAVAX) 82125 UNT/0.65ML Recon Susp    has had multiple episodes.  do shingles vaccine.           Followup: Return in about 8 months (around 7/8/2018).

## 2017-11-10 ENCOUNTER — HOSPITAL ENCOUNTER (OUTPATIENT)
Dept: RADIOLOGY | Facility: MEDICAL CENTER | Age: 62
End: 2017-11-10
Attending: OBSTETRICS & GYNECOLOGY
Payer: COMMERCIAL

## 2017-11-10 DIAGNOSIS — Z12.31 VISIT FOR SCREENING MAMMOGRAM: ICD-10-CM

## 2017-11-10 PROCEDURE — G0202 SCR MAMMO BI INCL CAD: HCPCS

## 2017-11-29 ENCOUNTER — APPOINTMENT (OUTPATIENT)
Dept: RADIOLOGY | Facility: MEDICAL CENTER | Age: 62
End: 2017-11-29
Attending: EMERGENCY MEDICINE
Payer: COMMERCIAL

## 2017-11-29 ENCOUNTER — HOSPITAL ENCOUNTER (EMERGENCY)
Facility: MEDICAL CENTER | Age: 62
End: 2017-11-29
Attending: EMERGENCY MEDICINE
Payer: COMMERCIAL

## 2017-11-29 VITALS
DIASTOLIC BLOOD PRESSURE: 79 MMHG | RESPIRATION RATE: 22 BRPM | BODY MASS INDEX: 37.69 KG/M2 | WEIGHT: 226.19 LBS | HEIGHT: 65 IN | TEMPERATURE: 98.7 F | OXYGEN SATURATION: 94 % | HEART RATE: 72 BPM | SYSTOLIC BLOOD PRESSURE: 165 MMHG

## 2017-11-29 DIAGNOSIS — J45.909 ASTHMATIC BRONCHITIS WITHOUT COMPLICATION, UNSPECIFIED ASTHMA SEVERITY, UNSPECIFIED WHETHER PERSISTENT: ICD-10-CM

## 2017-11-29 LAB
ALBUMIN SERPL BCP-MCNC: 3.9 G/DL (ref 3.2–4.9)
ALBUMIN/GLOB SERPL: 1.2 G/DL
ALP SERPL-CCNC: 75 U/L (ref 30–99)
ALT SERPL-CCNC: 34 U/L (ref 2–50)
ANION GAP SERPL CALC-SCNC: 10 MMOL/L (ref 0–11.9)
AST SERPL-CCNC: 40 U/L (ref 12–45)
BASOPHILS # BLD AUTO: 0.2 % (ref 0–1.8)
BASOPHILS # BLD: 0.03 K/UL (ref 0–0.12)
BILIRUB SERPL-MCNC: 1.7 MG/DL (ref 0.1–1.5)
BUN SERPL-MCNC: 14 MG/DL (ref 8–22)
CALCIUM SERPL-MCNC: 9 MG/DL (ref 8.4–10.2)
CHLORIDE SERPL-SCNC: 105 MMOL/L (ref 96–112)
CO2 SERPL-SCNC: 21 MMOL/L (ref 20–33)
CREAT SERPL-MCNC: 0.79 MG/DL (ref 0.5–1.4)
EKG IMPRESSION: NORMAL
EOSINOPHIL # BLD AUTO: 0.11 K/UL (ref 0–0.51)
EOSINOPHIL NFR BLD: 0.7 % (ref 0–6.9)
ERYTHROCYTE [DISTWIDTH] IN BLOOD BY AUTOMATED COUNT: 46.5 FL (ref 35.9–50)
GFR SERPL CREATININE-BSD FRML MDRD: >60 ML/MIN/1.73 M 2
GLOBULIN SER CALC-MCNC: 3.3 G/DL (ref 1.9–3.5)
GLUCOSE SERPL-MCNC: 84 MG/DL (ref 65–99)
HCT VFR BLD AUTO: 47.4 % (ref 37–47)
HGB BLD-MCNC: 15.7 G/DL (ref 12–16)
IMM GRANULOCYTES # BLD AUTO: 0.06 K/UL (ref 0–0.11)
IMM GRANULOCYTES NFR BLD AUTO: 0.4 % (ref 0–0.9)
LYMPHOCYTES # BLD AUTO: 1.84 K/UL (ref 1–4.8)
LYMPHOCYTES NFR BLD: 11.1 % (ref 22–41)
MCH RBC QN AUTO: 29.3 PG (ref 27–33)
MCHC RBC AUTO-ENTMCNC: 33.1 G/DL (ref 33.6–35)
MCV RBC AUTO: 88.6 FL (ref 81.4–97.8)
MONOCYTES # BLD AUTO: 0.97 K/UL (ref 0–0.85)
MONOCYTES NFR BLD AUTO: 5.9 % (ref 0–13.4)
NEUTROPHILS # BLD AUTO: 13.53 K/UL (ref 2–7.15)
NEUTROPHILS NFR BLD: 81.7 % (ref 44–72)
NRBC # BLD AUTO: 0 K/UL
NRBC BLD AUTO-RTO: 0 /100 WBC
PLATELET # BLD AUTO: 266 K/UL (ref 164–446)
PMV BLD AUTO: 9.9 FL (ref 9–12.9)
POTASSIUM SERPL-SCNC: 4.9 MMOL/L (ref 3.6–5.5)
PROT SERPL-MCNC: 7.2 G/DL (ref 6–8.2)
RBC # BLD AUTO: 5.35 M/UL (ref 4.2–5.4)
SODIUM SERPL-SCNC: 136 MMOL/L (ref 135–145)
WBC # BLD AUTO: 16.5 K/UL (ref 4.8–10.8)

## 2017-11-29 PROCEDURE — 94640 AIRWAY INHALATION TREATMENT: CPT

## 2017-11-29 PROCEDURE — 85025 COMPLETE CBC W/AUTO DIFF WBC: CPT

## 2017-11-29 PROCEDURE — 99284 EMERGENCY DEPT VISIT MOD MDM: CPT

## 2017-11-29 PROCEDURE — 71010 DX-CHEST-PORTABLE (1 VIEW): CPT

## 2017-11-29 PROCEDURE — 36415 COLL VENOUS BLD VENIPUNCTURE: CPT

## 2017-11-29 PROCEDURE — 80053 COMPREHEN METABOLIC PANEL: CPT

## 2017-11-29 PROCEDURE — 700101 HCHG RX REV CODE 250: Performed by: EMERGENCY MEDICINE

## 2017-11-29 PROCEDURE — 93005 ELECTROCARDIOGRAM TRACING: CPT

## 2017-11-29 PROCEDURE — 93005 ELECTROCARDIOGRAM TRACING: CPT | Performed by: EMERGENCY MEDICINE

## 2017-11-29 RX ORDER — ALBUTEROL SULFATE 90 UG/1
2 AEROSOL, METERED RESPIRATORY (INHALATION) EVERY 6 HOURS PRN
Qty: 8.5 G | Refills: 1 | Status: SHIPPED | OUTPATIENT
Start: 2017-11-29 | End: 2019-01-09 | Stop reason: SDUPTHER

## 2017-11-29 RX ORDER — OMEGA-3 FATTY ACIDS/FISH OIL 300-1000MG
3 CAPSULE ORAL EVERY EVENING
Status: SHIPPED | COMMUNITY
End: 2020-06-09

## 2017-11-29 RX ORDER — AZITHROMYCIN 250 MG/1
250 TABLET, FILM COATED ORAL DAILY
Qty: 5 TAB | Refills: 0 | Status: SHIPPED | OUTPATIENT
Start: 2017-11-29 | End: 2017-12-04

## 2017-11-29 RX ORDER — NAPROXEN SODIUM 220 MG
440 TABLET ORAL PRN
Status: SHIPPED | COMMUNITY
End: 2019-01-09

## 2017-11-29 RX ADMIN — ALBUTEROL SULFATE 2.5 MG: 2.5 SOLUTION RESPIRATORY (INHALATION) at 11:40

## 2017-11-29 ASSESSMENT — PAIN SCALES - GENERAL: PAINLEVEL_OUTOF10: 6

## 2017-11-29 NOTE — ED NOTES
Discharge instructions provided.  Pt verbalized the understanding of discharge instructions to follow up with PCP and to return to ER if condition worsens.  Pt ambulated out of ER without difficulty.   Educated on 2 new prescriptions. Strict return precautions given.

## 2017-11-29 NOTE — DISCHARGE INSTRUCTIONS
Chronic Bronchitis  Chronic bronchitis is a lasting inflammation of the bronchial tubes, which are the tubes that carry air into your lungs. This is inflammation that occurs:   · On most days of the week.    · For at least three months at a time.    · Over a period of two years in a row.  When the bronchial tubes are inflamed, they start to produce mucus. The inflammation and buildup of mucus make it more difficult to breathe. Chronic bronchitis is usually a permanent problem and is one type of chronic obstructive pulmonary disease (COPD). People with chronic bronchitis are at greater risk for getting repeated colds, or respiratory infections.  CAUSES   Chronic bronchitis most often occurs in people who have:  · Long-standing, severe asthma.  · A history of smoking.  · Asthma and who also smoke.  SIGNS AND SYMPTOMS   Chronic bronchitis may cause the following:   · A cough that brings up mucus (productive cough).  · Shortness of breath.  · Early morning headache.  · Wheezing.  · Chest discomfort.    · Recurring respiratory infections.  DIAGNOSIS   Your health care provider may confirm the diagnosis by:  · Taking your medical history.  · Performing a physical exam.  · Taking a chest X-ray.    · Performing pulmonary function tests.  TREATMENT   Treatment involves controlling symptoms with medicines, oxygen therapy, or making lifestyle changes, such as exercising and eating a healthy, well-balanced diet. Medicines could include:  · Inhalers to improve air flow in and out of your lungs.  · Antibiotics to treat bacterial infections, such as pneumonia, sinus infections, and acute bronchitis.  As a preventative measure, your health care provider may recommend routine vaccinations for influenza and pneumonia. This is to prevent infection and hospitalization since you may be more at risk for these types of infections.    HOME CARE INSTRUCTIONS  · Take medicines only as directed by your health care provider.    · If you smoke  "cigarettes, chew tobacco, or use electronic cigarettes, quit. If you need help quitting, ask your health care provider.  · Avoid pollen, dust, animal dander, molds, smoke, and other things that cause shortness of breath or wheezing attacks.  · Talk to your health care provider about possible exercise routines. Regular exercise is very important to help you feel better.  · If you are prescribed oxygen use at home follow these guidelines:  ¨ Never smoke while using oxygen. Oxygen does not burn or explode, but flammable materials will burn faster in the presence of oxygen.  ¨ Keep a fire extinguisher close by. Let your fire department know that you have oxygen in your home.  ¨ Warn visitors not to smoke near you when you are using oxygen. Put up \"no smoking\" signs in your home where you most often use the oxygen.  ¨ Regularly test your smoke detectors at home to make sure they work. If you receive care in your home from a nurse or other health care provider, he or she may also check to make sure your smoke detectors work.  · Ask your health care provider whether you would benefit from a pulmonary rehabilitation program.  · Do not wait to get medical care if you have any concerning symptoms. Delays could cause permanent injury and may be life threatening.  SEEK MEDICAL CARE IF:  · You have increased coughing or shortness of breath or both.  · You have muscle aches.  · You have chest pain.  · Your mucus gets thicker.  · Your mucus changes from clear or white to yellow, green, gray, or bloody.  SEEK IMMEDIATE MEDICAL CARE IF:  · Your usual medicines do not stop your wheezing.    · You have increased difficulty breathing.    · You have any problems with the medicine you are taking, such as a rash, itching, swelling, or trouble breathing.  MAKE SURE YOU:   · Understand these instructions.  · Will watch your condition.  · Will get help right away if you are not doing well or get worse.     This information is not intended to " replace advice given to you by your health care provider. Make sure you discuss any questions you have with your health care provider.     Document Released: 10/05/2007 Document Revised: 01/08/2016 Document Reviewed: 01/26/2015  [employer] Interactive Patient Education ©2016 [employer]

## 2017-11-29 NOTE — ED PROVIDER NOTES
ED Provider Note    CHIEF COMPLAINT  Chief Complaint   Patient presents with   • Cough   • Shortness of Breath       [facility] [place]  Elizabeth Nuñez is a 62 y.o. female who presentsFor cough right chest wall pain. She has no exertional chest pain she believes she has had some fever and chills. She feels worse she has been ill for 1 day. She denies myalgias abdominal pain nausea vomiting. Nothing specifically makes her symptoms worse or better. She's had no diaphoresis. She has used inhalers in the past for what I presume is asthma    REVIEW OF SYSTEMS  See HPI for further details. All other systems reviewed negative except as noted above    PAST MEDICAL HISTORY  Past Medical History:   Diagnosis Date   • Adenomatous polyp of colon 2009   • Allergic rhinitis    • Carpal tunnel syndrome 2009    EMG    • Diverticulitis 2009    recurrent.  CT scan 10/07 negative   • Dyslipidemia 2009   • GERD (gastroesophageal reflux disease) 2009   • Hypertension    • Hypothyroidism    • Impaired fasting glucose 2009   • Pruritic dermatitis 2009    followed by derm   • Pulmonary nodule 2009    Last CT chest 10/10 showed stable nodules with no further w/u needed   • Syncope     unknown etiology.  cardiac w/u neg       FAMILY HISTORY  Family History   Problem Relation Age of Onset   • Cancer Mother      ovarian, age 52   • Cancer Maternal Aunt      breast, dx age 33,  age 62   • Breast Cancer Maternal Aunt    • Cancer Maternal Aunt      ovarian cancer   • Cancer Other       cousin, breast cancer age 40   • Cancer Paternal Grandfather      stomach   • Cancer Paternal Uncle      stomach       SOCIAL HISTORY  Social History     Social History   • Marital status:      Spouse name: N/A   • Number of children: N/A   • Years of education: N/A     Social History Main Topics   • Smoking status: Never Smoker   • Smokeless tobacco: Never Used   • Alcohol use 0.0 oz/week   • Drug use: No   • Sexual  "activity: Not on file     Other Topics Concern   • Not on file     Social History Narrative   • No narrative on file       SURGICAL HISTORY  Past Surgical History:   Procedure Laterality Date   • HYSTERECTOMY ROBOTIC  2/19/2009    Performed by ARLEN ABDI at SURG[facility] ORS.  2/09 due to uterine fibroids and ovarian cyst   • ARTHROSCOPY, KNEE  2005    left knee repair   • METATARSAL HEAD RESECTION      metatarsal arthroplasty 2005   • PRIMARY C SECTION      x2   • UMBILICAL HERNIA REPAIR         CURRENT MEDICATIONS  Home Medications     Reviewed by Micah Sanford (Pharmacy Tech) on 11/29/17 at 1100  Med List Status: Complete   Medication Last Dose Status   acyclovir (ZOVIRAX) 400 MG tablet 11/29/2017 Active   atorvastatin (LIPITOR) 10 MG Tab > 2 days Active   B Complex Cap 11/28/2017 Active   Calcium Carb-Cholecalciferol (CALCIUM 500 +D PO) 11/28/2017 Active   DM-Phenylephrine-Acetaminophen (QC DAYTIME COLD/FLU PO) 11/28/2017 Active   levothyroxine (SYNTHROID) 125 MCG Tab 11/29/2017 Active   Lidocaine-Menthol (ICY HOT LIDOCAINE PLUS MENTHOL EX) 11/28/2017 Active   lisinopril (PRINIVIL) 10 MG Tab 11/29/2017 Active   naproxen (ALEVE) 220 MG tablet 11/29/2017 Active   Omega 3 1000 MG Cap 11/28/2017 Active   OMEPRAZOLE 20 MG PO CPDR 11/28/2017 Active   Pseudoephedrine-DM-GG (ROBITUSSIN COLD & COUGH PO) 11/29/2017 Active   Psyllium (METAMUCIL) 0.52 GM CAPS 11/29/2017 Active                 ALLERGIES  Allergies   Allergen Reactions   • Morphine Hives and Itching   • Sulfa Drugs Hives and Itching       PHYSICAL EXAM  VITAL SIGNS: BP (!) 165/79   Pulse 72   Temp 37.1 °C (98.7 °F)   Resp (!) 22   Ht 1.651 m (5' 5\")   Wt 102.6 kg (226 lb 3.1 oz)   LMP 08/11/2010   SpO2 94%   BMI 37.64 kg/m²    Constitutional :  Well developed, Well nourished, No acute distress, Non-toxic appearance.   HENT:Head is atraumatic normocephalic oropharynx normal.  Eyes: Normal-appearing nonicteric  Neck: Normal range of " motion, No tenderness, Supple, No stridor.   Lymphatic: No cervical adenopathy.   Cardiovascular: Normal heart rate, Normal rhythm, No murmurs, No rubs, No gallops.   Thorax & Lungs: Regular rhythm regular without murmurs or gallops  Skin: Warm, Dry, No erythema, No rash.   Abdomen soft nontender no masses  Extremities no cyanosis or edema  Neurologic she is awake alert without focal findings    DX-CHEST-PORTABLE (1 VIEW)   Final Result      Negative single view of the chest.          Results for orders placed or performed during the hospital encounter of 11/29/17   CBC w/ Differential   Result Value Ref Range    WBC 16.5 (H) 4.8 - 10.8 K/uL    RBC 5.35 4.20 - 5.40 M/uL    Hemoglobin 15.7 12.0 - 16.0 g/dL    Hematocrit 47.4 (H) 37.0 - 47.0 %    MCV 88.6 81.4 - 97.8 fL    MCH 29.3 27.0 - 33.0 pg    MCHC 33.1 (L) 33.6 - 35.0 g/dL    RDW 46.5 35.9 - 50.0 fL    Platelet Count 266 164 - 446 K/uL    MPV 9.9 9.0 - 12.9 fL    Neutrophils-Polys 81.70 (H) 44.00 - 72.00 %    Lymphocytes 11.10 (L) 22.00 - 41.00 %    Monocytes 5.90 0.00 - 13.40 %    Eosinophils 0.70 0.00 - 6.90 %    Basophils 0.20 0.00 - 1.80 %    Immature Granulocytes 0.40 0.00 - 0.90 %    Nucleated RBC 0.00 /100 WBC    Neutrophils (Absolute) 13.53 (H) 2.00 - 7.15 K/uL    Lymphs (Absolute) 1.84 1.00 - 4.80 K/uL    Monos (Absolute) 0.97 (H) 0.00 - 0.85 K/uL    Eos (Absolute) 0.11 0.00 - 0.51 K/uL    Baso (Absolute) 0.03 0.00 - 0.12 K/uL    Immature Granulocytes (abs) 0.06 0.00 - 0.11 K/uL    NRBC (Absolute) 0.00 K/uL   Complete Metabolic Panel (CMP)   Result Value Ref Range    Sodium 136 135 - 145 mmol/L    Potassium 4.9 3.6 - 5.5 mmol/L    Chloride 105 96 - 112 mmol/L    Co2 21 20 - 33 mmol/L    Anion Gap 10.0 0.0 - 11.9    Glucose 84 65 - 99 mg/dL    Bun 14 8 - 22 mg/dL    Creatinine 0.79 0.50 - 1.40 mg/dL    Calcium 9.0 8.4 - 10.2 mg/dL    AST(SGOT) 40 12 - 45 U/L    ALT(SGPT) 34 2 - 50 U/L    Alkaline Phosphatase 75 30 - 99 U/L    Total Bilirubin 1.7 (H) 0.1  - 1.5 mg/dL    Albumin 3.9 3.2 - 4.9 g/dL    Total Protein 7.2 6.0 - 8.2 g/dL    Globulin 3.3 1.9 - 3.5 g/dL    A-G Ratio 1.2 g/dL   ESTIMATED GFR   Result Value Ref Range    GFR If African American >60 >60 mL/min/1.73 m 2    GFR If Non African American >60 >60 mL/min/1.73 m 2   EKG NOW   Result Value Ref Range    Report       [facility] Emergency Dept.    Test Date:  2017  Pt Name:    URSULA TOBIAS                Department: [facility]  MRN:        [id number]                      Room:  Gender:     F                            Technician: TEQUILA  :        [id number]                   Requested By:ER TRIAGE PROTOCOL  Order #:    [id number]                    Reading MD:    Measurements  Intervals                                Axis  Rate:       61                           P:          46  NC:         144                          QRS:        25  QRSD:       84                           T:          72  QT:         368  QTc:        371    Interpretive Statements  SINUS RHYTHM  Compared to ECG 2009 09:15:22  Sinus bradycardia no longer present       EKG Interpretation    Interpreted by me    Rhythm: normal sinus   Rate: normal  Axis: normal  Ectopy: none  Conduction: normal  ST Segments: no acute change  T Waves: no acute change  Q Waves: none    Clinical Impression: no acute changes and normal EKG    COURSE & MEDICAL DECISION MAKING  Pertinent Labs & Imaging studies reviewed. (See chart for details)  The patient presents with a cough one days duration she has no evidence of pneumonia she does have mild white blood count elevation which is a nonspecific finding at this time she was given albuterol treatment and felt much better I suspect she has a component of asthmatic bronchitis possible underlying pneumonia she'll be treated with azithromycin and albuterol inhaler she is not hypoxemic she is to return the emergency Department for increasing difficulty breathing fever or any other  concerns    FINAL IMPRESSION  1. Asthmatic bronchitis  2.   3.      Electronically signed by: Garret Katz, 11/29/2017

## 2017-11-29 NOTE — ED NOTES
Patient states feeling better and ready for discharge. Requesting inhaler prescription. ERP to bedside.

## 2017-11-29 NOTE — ED NOTES
Complaint of fever/chills, cough with grey sputum, and right chest wall pain. States took aleve and cough syrup. EKG in triage for SOB.

## 2017-12-18 DIAGNOSIS — E78.5 HYPERLIPIDEMIA LDL GOAL <100: ICD-10-CM

## 2017-12-18 RX ORDER — ATORVASTATIN CALCIUM 10 MG/1
10 TABLET, FILM COATED ORAL
Qty: 12 TAB | Refills: 2 | Status: SHIPPED | OUTPATIENT
Start: 2017-12-18 | End: 2018-02-14 | Stop reason: SDUPTHER

## 2017-12-23 LAB
ALBUMIN SERPL-MCNC: 3.7 G/DL (ref 3.6–4.8)
ALBUMIN/GLOB SERPL: 1.2 {RATIO} (ref 1.2–2.2)
ALP SERPL-CCNC: 74 IU/L (ref 39–117)
ALT SERPL-CCNC: 25 IU/L (ref 0–32)
AST SERPL-CCNC: 20 IU/L (ref 0–40)
BILIRUB SERPL-MCNC: 0.8 MG/DL (ref 0–1.2)
BUN SERPL-MCNC: 11 MG/DL (ref 8–27)
BUN/CREAT SERPL: 15 (ref 12–28)
CALCIUM SERPL-MCNC: 8.9 MG/DL (ref 8.7–10.3)
CHLORIDE SERPL-SCNC: 103 MMOL/L (ref 96–106)
CHOLEST SERPL-MCNC: 162 MG/DL (ref 100–199)
CHOLEST/HDLC SERPL: 3.7 RATIO UNITS (ref 0–4.4)
CO2 SERPL-SCNC: 21 MMOL/L (ref 18–29)
COMMENT 011824: NORMAL
CREAT SERPL-MCNC: 0.71 MG/DL (ref 0.57–1)
GLOBULIN SER CALC-MCNC: 3.1 G/DL (ref 1.5–4.5)
GLUCOSE SERPL-MCNC: 97 MG/DL (ref 65–99)
HDLC SERPL-MCNC: 44 MG/DL
IF AFRICAN AMERICAN  100797: 106 ML/MIN/1.73
IF NON AFRICAN AMER 100791: 92 ML/MIN/1.73
LDLC SERPL CALC-MCNC: 97 MG/DL (ref 0–99)
POTASSIUM SERPL-SCNC: 4.5 MMOL/L (ref 3.5–5.2)
PROT SERPL-MCNC: 6.8 G/DL (ref 6–8.5)
SODIUM SERPL-SCNC: 140 MMOL/L (ref 134–144)
TRIGL SERPL-MCNC: 104 MG/DL (ref 0–149)
VLDLC SERPL CALC-MCNC: 21 MG/DL (ref 5–40)

## 2017-12-26 ENCOUNTER — TELEPHONE (OUTPATIENT)
Dept: MEDICAL GROUP | Facility: MEDICAL CENTER | Age: 62
End: 2017-12-26

## 2017-12-26 NOTE — TELEPHONE ENCOUNTER
Please let pt know that the lab is wnl except for mildly dec HDL.  Inc exercise and low fat/chol diet can improve this.

## 2018-01-15 DIAGNOSIS — B02.8 HERPES ZOSTER WITH COMPLICATION: ICD-10-CM

## 2018-01-15 RX ORDER — ACYCLOVIR 400 MG/1
400 TABLET ORAL 2 TIMES DAILY
Qty: 180 TAB | Refills: 3 | Status: SHIPPED | OUTPATIENT
Start: 2018-01-15 | End: 2018-12-29 | Stop reason: SDUPTHER

## 2018-02-14 DIAGNOSIS — E78.5 HYPERLIPIDEMIA LDL GOAL <100: ICD-10-CM

## 2018-02-14 RX ORDER — ATORVASTATIN CALCIUM 10 MG/1
10 TABLET, FILM COATED ORAL
Qty: 36 TAB | Refills: 1 | Status: SHIPPED | OUTPATIENT
Start: 2018-02-14 | End: 2018-09-21 | Stop reason: SDUPTHER

## 2018-02-21 DIAGNOSIS — I10 ESSENTIAL HYPERTENSION: Chronic | ICD-10-CM

## 2018-02-21 RX ORDER — LEVOTHYROXINE SODIUM 0.12 MG/1
125 TABLET ORAL
Qty: 90 TAB | Refills: 1 | Status: SHIPPED | OUTPATIENT
Start: 2018-02-21 | End: 2018-09-27 | Stop reason: SDUPTHER

## 2018-02-21 RX ORDER — LISINOPRIL 10 MG/1
10 TABLET ORAL 2 TIMES DAILY
Qty: 180 TAB | Refills: 1 | Status: SHIPPED | OUTPATIENT
Start: 2018-02-21 | End: 2018-09-27 | Stop reason: SDUPTHER

## 2018-09-21 DIAGNOSIS — E03.9 ACQUIRED HYPOTHYROIDISM: ICD-10-CM

## 2018-09-21 DIAGNOSIS — I10 ESSENTIAL HYPERTENSION: ICD-10-CM

## 2018-09-21 DIAGNOSIS — E78.5 HYPERLIPIDEMIA LDL GOAL <100: ICD-10-CM

## 2018-09-21 NOTE — LETTER
September 25, 2018        Elizabeth Nuñez  [address]  [facility] [address]        Dear Elizabeth:    We have received a request from your pharmacy to refill your prescription(s). After careful review of your chart, we have noted you are due for labs and a follow up appointment.  We request you call our office at [phone] at your earliest convenience and make an appointment. I have included a fasting lab order for you.    However, when patients are not followed closely by their physician, potential medication complications may go unadressed. We look forward to scheduling an appointment for you, so that we may provide you with the safest and most complete medical care.        If you have any questions or concerns, please don't hesitate to call.        Sincerely,        RHEA Carrera.    Electronically Signed

## 2018-09-24 RX ORDER — ATORVASTATIN CALCIUM 10 MG/1
10 TABLET, FILM COATED ORAL
Qty: 15 TAB | Refills: 0 | Status: SHIPPED | OUTPATIENT
Start: 2018-09-24 | End: 2019-01-09 | Stop reason: SDUPTHER

## 2018-09-27 DIAGNOSIS — I10 ESSENTIAL HYPERTENSION: Chronic | ICD-10-CM

## 2018-09-27 RX ORDER — LEVOTHYROXINE SODIUM 0.12 MG/1
125 TABLET ORAL
Qty: 30 TAB | Refills: 0 | Status: SHIPPED | OUTPATIENT
Start: 2018-09-27 | End: 2019-01-09

## 2018-09-27 RX ORDER — LISINOPRIL 10 MG/1
TABLET ORAL
Qty: 60 TAB | Refills: 0 | Status: SHIPPED | OUTPATIENT
Start: 2018-09-27 | End: 2020-01-06 | Stop reason: SDUPTHER

## 2018-10-02 DIAGNOSIS — E78.5 HYPERLIPIDEMIA LDL GOAL <100: ICD-10-CM

## 2018-10-02 RX ORDER — ATORVASTATIN CALCIUM 10 MG/1
10 TABLET, FILM COATED ORAL
Qty: 6 TAB | Refills: 0 | Status: SHIPPED | OUTPATIENT
Start: 2018-10-02 | End: 2018-10-04 | Stop reason: SDUPTHER

## 2018-10-02 RX ORDER — LEVOTHYROXINE SODIUM 0.12 MG/1
125 TABLET ORAL
Qty: 15 TAB | Refills: 0 | Status: SHIPPED | OUTPATIENT
Start: 2018-10-02 | End: 2019-01-09

## 2018-10-02 NOTE — LETTER
October 2, 2018        Elizabeth Nuñez  [address]  [place] [address]        Dear Elizabeth:    We have received a request from your pharmacy to refill your prescription(s). After careful review of your chart, we have noted you are due for labs and a follow up appointment.  We request you call our office at [phone] at your earliest convenience and make an appointment. I have included a fasting lab order for you.    However, when patients are not followed closely by their physician, potential medication complications may go unadressed. We look forward to scheduling an appointment for you, so that we may provide you with the safest and most complete medical care.        If you have any questions or concerns, please don't hesitate to call.        Sincerely,        RHEA Carrera.    Electronically Signed

## 2018-10-02 NOTE — TELEPHONE ENCOUNTER
Was the patient seen in the last year in this department? Yes    Does patient have an active prescription for medications requested? No     Received Request Via: Pharmacy

## 2018-10-04 DIAGNOSIS — E78.5 HYPERLIPIDEMIA LDL GOAL <100: ICD-10-CM

## 2018-10-04 DIAGNOSIS — I10 ESSENTIAL HYPERTENSION: Chronic | ICD-10-CM

## 2018-10-04 RX ORDER — ATORVASTATIN CALCIUM 10 MG/1
10 TABLET, FILM COATED ORAL
Qty: 36 TAB | Refills: 0 | Status: SHIPPED | OUTPATIENT
Start: 2018-10-04 | End: 2019-01-09

## 2018-10-04 RX ORDER — LEVOTHYROXINE SODIUM 0.12 MG/1
125 TABLET ORAL
Qty: 90 TAB | Refills: 0 | Status: SHIPPED | OUTPATIENT
Start: 2018-10-04 | End: 2019-01-09 | Stop reason: SDUPTHER

## 2018-10-04 RX ORDER — LISINOPRIL 10 MG/1
TABLET ORAL
Qty: 180 TAB | Refills: 0 | Status: SHIPPED | OUTPATIENT
Start: 2018-10-04 | End: 2019-01-09

## 2018-10-04 NOTE — TELEPHONE ENCOUNTER
Was the patient seen in the last year in this department? Yes    Does patient have an active prescription for medications requested? No     Received Request Via: Pharmacy     Confirmed with Pharmacy Levothyroxine refill not received

## 2019-01-05 PROBLEM — R73.01 IFG (IMPAIRED FASTING GLUCOSE): Status: ACTIVE | Noted: 2019-01-05

## 2019-01-08 ENCOUNTER — HOSPITAL ENCOUNTER (OUTPATIENT)
Dept: RADIOLOGY | Facility: MEDICAL CENTER | Age: 64
End: 2019-01-08
Attending: NURSE PRACTITIONER
Payer: COMMERCIAL

## 2019-01-08 DIAGNOSIS — Z12.31 BREAST CANCER SCREENING BY MAMMOGRAM: ICD-10-CM

## 2019-01-08 LAB
ALBUMIN SERPL-MCNC: 4.1 G/DL (ref 3.6–4.8)
ALBUMIN/CREAT UR: 8.9 MG/G CREAT (ref 0–30)
ALBUMIN/GLOB SERPL: 1.5 {RATIO} (ref 1.2–2.2)
ALP SERPL-CCNC: 81 IU/L (ref 39–117)
ALT SERPL-CCNC: 18 IU/L (ref 0–32)
AST SERPL-CCNC: 17 IU/L (ref 0–40)
BILIRUB SERPL-MCNC: 0.7 MG/DL (ref 0–1.2)
BUN SERPL-MCNC: 13 MG/DL (ref 8–27)
BUN/CREAT SERPL: 18 (ref 12–28)
CALCIUM SERPL-MCNC: 9.5 MG/DL (ref 8.7–10.3)
CHLORIDE SERPL-SCNC: 104 MMOL/L (ref 96–106)
CHOLEST SERPL-MCNC: 202 MG/DL (ref 100–199)
CO2 SERPL-SCNC: 23 MMOL/L (ref 20–29)
CREAT SERPL-MCNC: 0.72 MG/DL (ref 0.57–1)
CREAT UR-MCNC: 246.3 MG/DL
GLOBULIN SER CALC-MCNC: 2.8 G/DL (ref 1.5–4.5)
GLUCOSE SERPL-MCNC: 91 MG/DL (ref 65–99)
HDLC SERPL-MCNC: 50 MG/DL
IF AFRICAN AMERICAN  100797: 104 ML/MIN/1.73
IF NON AFRICAN AMER 100791: 90 ML/MIN/1.73
LABORATORY COMMENT REPORT: ABNORMAL
LDLC SERPL CALC-MCNC: 127 MG/DL (ref 0–99)
MICROALBUMIN UR-MCNC: 21.9 UG/ML
POTASSIUM SERPL-SCNC: 4.5 MMOL/L (ref 3.5–5.2)
PROT SERPL-MCNC: 6.9 G/DL (ref 6–8.5)
SODIUM SERPL-SCNC: 142 MMOL/L (ref 134–144)
T4 FREE SERPL-MCNC: 1.83 NG/DL (ref 0.82–1.77)
TRIGL SERPL-MCNC: 127 MG/DL (ref 0–149)
TSH SERPL DL<=0.005 MIU/L-ACNC: 1.24 UIU/ML (ref 0.45–4.5)
VLDLC SERPL CALC-MCNC: 25 MG/DL (ref 5–40)

## 2019-01-08 PROCEDURE — 77067 SCR MAMMO BI INCL CAD: CPT

## 2019-01-09 ENCOUNTER — OFFICE VISIT (OUTPATIENT)
Dept: MEDICAL GROUP | Facility: MEDICAL CENTER | Age: 64
End: 2019-01-09
Payer: COMMERCIAL

## 2019-01-09 VITALS
TEMPERATURE: 97 F | HEART RATE: 64 BPM | HEIGHT: 65 IN | WEIGHT: 214 LBS | DIASTOLIC BLOOD PRESSURE: 80 MMHG | OXYGEN SATURATION: 96 % | SYSTOLIC BLOOD PRESSURE: 140 MMHG | BODY MASS INDEX: 35.65 KG/M2

## 2019-01-09 DIAGNOSIS — E03.9 ACQUIRED HYPOTHYROIDISM: ICD-10-CM

## 2019-01-09 DIAGNOSIS — I10 ESSENTIAL HYPERTENSION: ICD-10-CM

## 2019-01-09 DIAGNOSIS — E78.5 HYPERLIPIDEMIA LDL GOAL <100: ICD-10-CM

## 2019-01-09 DIAGNOSIS — E66.9 OBESITY (BMI 35.0-39.9 WITHOUT COMORBIDITY): ICD-10-CM

## 2019-01-09 DIAGNOSIS — Z91.09 ALLERGY TO POLLEN: ICD-10-CM

## 2019-01-09 PROCEDURE — 99214 OFFICE O/P EST MOD 30 MIN: CPT | Performed by: NURSE PRACTITIONER

## 2019-01-09 RX ORDER — ATORVASTATIN CALCIUM 10 MG/1
10 TABLET, FILM COATED ORAL
Qty: 50 TAB | Refills: 3 | Status: SHIPPED | OUTPATIENT
Start: 2019-01-09 | End: 2020-01-06 | Stop reason: SDUPTHER

## 2019-01-09 RX ORDER — ALBUTEROL SULFATE 90 UG/1
2 AEROSOL, METERED RESPIRATORY (INHALATION) EVERY 6 HOURS PRN
Qty: 8.5 G | Refills: 1 | Status: SHIPPED | OUTPATIENT
Start: 2019-01-09 | End: 2022-05-03 | Stop reason: SDUPTHER

## 2019-01-09 RX ORDER — LEVOTHYROXINE SODIUM 0.12 MG/1
125 TABLET ORAL
Qty: 90 TAB | Refills: 3 | Status: SHIPPED | OUTPATIENT
Start: 2019-01-09 | End: 2020-01-06 | Stop reason: SDUPTHER

## 2019-01-09 ASSESSMENT — PATIENT HEALTH QUESTIONNAIRE - PHQ9: CLINICAL INTERPRETATION OF PHQ2 SCORE: 0

## 2019-01-09 NOTE — PROGRESS NOTES
Subjective:     Elizabeth Nuñez is a 63 y.o. female who presents with HTN.    HPI:   Seen in f/u for HTN.  Her SBP shows borderline elevation at 140.    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  Needs refills on meds.   She has not been able to take all the statin that she was supposed to take since not getting enough with last refill.  She feels that this is causing her LDL to be higher than it was.  Reviewed lab with pt.  Her TSH is wnl but T4 sl elevated.  CMP, Alb/cr ratio is wnl.    LP shows good trg and HDL.  LDL is up from last time at 97 to 127.     Patient Active Problem List    Diagnosis Date Noted   • Obesity (BMI 35.0-39.9 without comorbidity) (HCC) 01/09/2019   • IFG (impaired fasting glucose) 01/05/2019   • Obesity (BMI 30-39.9) 07/13/2017   • Allergy to pollen    • Hypertension 02/22/2012   • Syncope 05/20/2010   • Hypothyroid 04/22/2009   • Pulmonary nodule 04/22/2009   • Dyslipidemia 04/22/2009   • Pruritic dermatitis 04/22/2009   • History of diverticulitis of colon 04/22/2009   • Carpal tunnel syndrome 04/22/2009   • Adenomatous polyp of colon 04/22/2009   • Preventative health care 04/22/2009   • GERD (gastroesophageal reflux disease) 04/22/2009       Current medicines (including changes today)  Current Outpatient Prescriptions   Medication Sig Dispense Refill   • albuterol 108 (90 Base) MCG/ACT Aero Soln inhalation aerosol Inhale 2 Puffs by mouth every 6 hours as needed for Shortness of Breath. 8.5 g 1   • atorvastatin (LIPITOR) 10 MG Tab Take 1 Tab by mouth every 48 hours. 50 Tab 3   • levothyroxine (SYNTHROID) 125 MCG Tab Take 1 Tab by mouth every day. 90 Tab 3   • Calcium Carb-Cholecalciferol (CALCIUM 500 +D PO) Take 3 Tabs by mouth every evening.     • B Complex Cap Take 1 Cap by mouth every day. 30 Cap 0   • acyclovir (ZOVIRAX) 400 MG tablet TAKE 1 TABLET BY MOUTH 2 TIMES A DAY. 180 Tab 0   • lisinopril (PRINIVIL) 10 MG Tab TAKE 1 TAB BY MOUTH 2 TIMES A DAY 60 Tab  "0   • Omega 3 1000 MG Cap Take 3 Caps by mouth every evening.     • Psyllium (METAMUCIL) 0.52 GM CAPS Take 5 Caps by mouth every day.     • OMEPRAZOLE 20 MG PO CPDR Take 20 mg by mouth as needed (For heartburn).  11     No current facility-administered medications for this visit.        Allergies   Allergen Reactions   • Morphine Hives and Itching   • Sulfa Drugs Hives and Itching       ROS  Constitutional: Negative. Negative for fever, chills, weight loss, malaise/fatigue and diaphoresis.   HENT: Negative. Negative for hearing loss, ear pain, nosebleeds, congestion, sore throat, neck pain, tinnitus and ear discharge.   Respiratory: Negative. Negative for cough, hemoptysis, sputum production, shortness of breath, wheezing and stridor.   Cardiovascular: Negative. Negative for chest pain, palpitations, orthopnea, claudication, leg swelling and PND.   Gastrointestinal: Denies nausea, vomiting, diarrhea, constipation, heartburn, melena or hematochezia.  Genitourinary: Denies dysuria, hematuria, urinary incontinence, frequency or urgency.        Objective:     Blood pressure 140/80, pulse 64, temperature 36.1 °C (97 °F), temperature source Temporal, height 1.651 m (5' 5\"), weight 97.1 kg (214 lb), last menstrual period 08/11/2010, SpO2 96 %, not currently breastfeeding. Body mass index is 35.61 kg/m².    Physical Exam:  Vitals reviewed.  Constitutional: Oriented to person, place, and time. appears well-developed and well-nourished. No distress.   Cardiovascular: Normal rate, regular rhythm, normal heart sounds and intact distal pulses. Exam reveals no gallop and no friction rub. No murmur heard. No carotid bruits.   Pulmonary/Chest: Effort normal and breath sounds normal. No stridor. No respiratory distress. no wheezes or rales. exhibits no tenderness.   Musculoskeletal: Normal range of motion. exhibits no edema. norma pedal pulses 2+.  Lymphadenopathy: No cervical or supraclavicular adenopathy.   Neurological: Alert and " oriented to person, place, and time. exhibits normal muscle tone.  Skin: Skin is warm and dry. No diaphoresis.   Psychiatric: Normal mood and affect. Behavior is normal.      Assessment and Plan:     The following treatment plan was discussed:    1. Hyperlipidemia LDL goal <100  atorvastatin (LIPITOR) 10 MG Tab    stable on lipitor 10 mg 3x/wk.  refilled meds.  LDL up but ran out of meds. plan recheck lab when back from [place] in 6 mo.  call for lab slip   2. Acquired hypothyroidism      stable on meds.  TSH is wnl but T4 is sl elevated. plan recheck in 6 months.     3. Essential hypertension      stable on meds.  refilled all meds.  f/u 6 months.  call for lab slip   4. Allergy to pollen      needs refill on alb inhaler.  going back to [place] and they have bad smog.   5. Obesity (BMI 35.0-39.9 without comorbidity)  Patient identified as having weight management issue.  Appropriate orders and counseling given.         Followup: Return in about 6 months (around 7/9/2019).

## 2019-01-10 ENCOUNTER — APPOINTMENT (RX ONLY)
Dept: URBAN - METROPOLITAN AREA CLINIC 35 | Facility: CLINIC | Age: 64
Setting detail: DERMATOLOGY
End: 2019-01-10

## 2019-01-10 DIAGNOSIS — D22 MELANOCYTIC NEVI: ICD-10-CM

## 2019-01-10 DIAGNOSIS — L82.1 OTHER SEBORRHEIC KERATOSIS: ICD-10-CM

## 2019-01-10 DIAGNOSIS — L81.4 OTHER MELANIN HYPERPIGMENTATION: ICD-10-CM

## 2019-01-10 DIAGNOSIS — Z71.89 OTHER SPECIFIED COUNSELING: ICD-10-CM

## 2019-01-10 PROCEDURE — ? COUNSELING

## 2019-01-10 ASSESSMENT — LOCATION SIMPLE DESCRIPTION DERM: LOCATION SIMPLE: UPPER BACK

## 2019-01-10 ASSESSMENT — LOCATION DETAILED DESCRIPTION DERM
LOCATION DETAILED: SUPERIOR THORACIC SPINE
LOCATION DETAILED: INFERIOR THORACIC SPINE

## 2019-01-10 ASSESSMENT — LOCATION ZONE DERM: LOCATION ZONE: TRUNK

## 2019-01-11 ENCOUNTER — APPOINTMENT (RX ONLY)
Dept: URBAN - METROPOLITAN AREA CLINIC 35 | Facility: CLINIC | Age: 64
Setting detail: DERMATOLOGY
End: 2019-01-11

## 2019-01-11 DIAGNOSIS — D22 MELANOCYTIC NEVI: ICD-10-CM

## 2019-01-11 DIAGNOSIS — Z71.89 OTHER SPECIFIED COUNSELING: ICD-10-CM

## 2019-01-11 DIAGNOSIS — L82.1 OTHER SEBORRHEIC KERATOSIS: ICD-10-CM

## 2019-01-11 DIAGNOSIS — L81.4 OTHER MELANIN HYPERPIGMENTATION: ICD-10-CM

## 2019-01-11 PROBLEM — I10 ESSENTIAL (PRIMARY) HYPERTENSION: Status: ACTIVE | Noted: 2019-01-11

## 2019-01-11 PROBLEM — L57.0 ACTINIC KERATOSIS: Status: ACTIVE | Noted: 2019-01-11

## 2019-01-11 PROBLEM — J30.1 ALLERGIC RHINITIS DUE TO POLLEN: Status: ACTIVE | Noted: 2019-01-11

## 2019-01-11 PROBLEM — D22.5 MELANOCYTIC NEVI OF TRUNK: Status: ACTIVE | Noted: 2019-01-11

## 2019-01-11 PROCEDURE — ? COUNSELING

## 2019-01-11 PROCEDURE — 99213 OFFICE O/P EST LOW 20 MIN: CPT

## 2019-01-11 ASSESSMENT — LOCATION DETAILED DESCRIPTION DERM
LOCATION DETAILED: RIGHT MEDIAL UPPER BACK
LOCATION DETAILED: RIGHT SUPERIOR MEDIAL MIDBACK
LOCATION DETAILED: RIGHT INFERIOR MEDIAL UPPER BACK
LOCATION DETAILED: RIGHT LATERAL MANDIBULAR CHEEK
LOCATION DETAILED: SUPERIOR THORACIC SPINE

## 2019-01-11 ASSESSMENT — LOCATION SIMPLE DESCRIPTION DERM
LOCATION SIMPLE: UPPER BACK
LOCATION SIMPLE: RIGHT CHEEK
LOCATION SIMPLE: RIGHT LOWER BACK
LOCATION SIMPLE: RIGHT UPPER BACK

## 2019-01-11 ASSESSMENT — LOCATION ZONE DERM
LOCATION ZONE: FACE
LOCATION ZONE: TRUNK

## 2019-05-27 DIAGNOSIS — E03.8 OTHER SPECIFIED HYPOTHYROIDISM: ICD-10-CM

## 2019-05-27 DIAGNOSIS — R94.6 ABNORMAL THYROID FUNCTION TEST: ICD-10-CM

## 2019-05-27 DIAGNOSIS — R73.01 IFG (IMPAIRED FASTING GLUCOSE): ICD-10-CM

## 2019-05-27 DIAGNOSIS — E78.5 HYPERLIPIDEMIA LDL GOAL <100: ICD-10-CM

## 2019-06-20 LAB
ALBUMIN SERPL-MCNC: 4 G/DL (ref 3.6–4.8)
ALBUMIN/GLOB SERPL: 1.4 {RATIO} (ref 1.2–2.2)
ALP SERPL-CCNC: 81 IU/L (ref 39–117)
ALT SERPL-CCNC: 36 IU/L (ref 0–32)
AST SERPL-CCNC: 24 IU/L (ref 0–40)
BILIRUB SERPL-MCNC: 0.7 MG/DL (ref 0–1.2)
BUN SERPL-MCNC: 11 MG/DL (ref 8–27)
BUN/CREAT SERPL: 14 (ref 12–28)
CALCIUM SERPL-MCNC: 9.7 MG/DL (ref 8.7–10.3)
CHLORIDE SERPL-SCNC: 104 MMOL/L (ref 96–106)
CHOLEST SERPL-MCNC: 192 MG/DL (ref 100–199)
CO2 SERPL-SCNC: 23 MMOL/L (ref 20–29)
CREAT SERPL-MCNC: 0.81 MG/DL (ref 0.57–1)
GLOBULIN SER CALC-MCNC: 2.9 G/DL (ref 1.5–4.5)
GLUCOSE SERPL-MCNC: 97 MG/DL (ref 65–99)
HBA1C MFR BLD: 5.8 % (ref 4.8–5.6)
HDLC SERPL-MCNC: 60 MG/DL
LABORATORY COMMENT REPORT: ABNORMAL
LDLC SERPL CALC-MCNC: 113 MG/DL (ref 0–99)
POTASSIUM SERPL-SCNC: 5 MMOL/L (ref 3.5–5.2)
PROT SERPL-MCNC: 6.9 G/DL (ref 6–8.5)
SODIUM SERPL-SCNC: 141 MMOL/L (ref 134–144)
T4 FREE SERPL-MCNC: 1.74 NG/DL (ref 0.82–1.77)
TRIGL SERPL-MCNC: 95 MG/DL (ref 0–149)
TSH SERPL DL<=0.005 MIU/L-ACNC: 1.39 UIU/ML (ref 0.45–4.5)
VLDLC SERPL CALC-MCNC: 19 MG/DL (ref 5–40)

## 2019-06-25 ENCOUNTER — OFFICE VISIT (OUTPATIENT)
Dept: MEDICAL GROUP | Facility: MEDICAL CENTER | Age: 64
End: 2019-06-25
Payer: COMMERCIAL

## 2019-06-25 VITALS
HEIGHT: 65 IN | WEIGHT: 215 LBS | HEART RATE: 67 BPM | TEMPERATURE: 97.2 F | BODY MASS INDEX: 35.82 KG/M2 | SYSTOLIC BLOOD PRESSURE: 130 MMHG | DIASTOLIC BLOOD PRESSURE: 76 MMHG | OXYGEN SATURATION: 97 %

## 2019-06-25 DIAGNOSIS — Z11.59 NEED FOR HEPATITIS B SCREENING TEST: ICD-10-CM

## 2019-06-25 DIAGNOSIS — R79.89 ELEVATED LFTS: ICD-10-CM

## 2019-06-25 DIAGNOSIS — Z11.59 ENCOUNTER FOR HEPATITIS C SCREENING TEST FOR LOW RISK PATIENT: ICD-10-CM

## 2019-06-25 DIAGNOSIS — R73.01 IFG (IMPAIRED FASTING GLUCOSE): ICD-10-CM

## 2019-06-25 DIAGNOSIS — E03.9 ACQUIRED HYPOTHYROIDISM: ICD-10-CM

## 2019-06-25 DIAGNOSIS — E78.5 HYPERLIPIDEMIA LDL GOAL <100: ICD-10-CM

## 2019-06-25 PROCEDURE — 99214 OFFICE O/P EST MOD 30 MIN: CPT | Performed by: NURSE PRACTITIONER

## 2019-06-25 NOTE — PROGRESS NOTES
Subjective:     Elizabeth Nuñez is a 64 y.o. female who presents with IFG.    HPI:   Seen in f/u for IFG.  She is getting over cold, bronchitis and allergies.  Still coughing. Using alb inhaler occas.  occas wheezing.  Coughing up clear secretions.    Reviewed lab with pt.  Her a1c is 5.8.  This is new finding.  Not on a low carb diet.  Eating lots of peanut m&m's  CMP is wnl except for single mildly elevated lft.  TSH, T4, GFR is wnl  LP shows good trg and HDL.  LDL is improved from last.  Was 127 and now 113.  She has been missing a few lipitor to have presc last longer.  She has been in [place] last fall but may be going back.    She is due updated hep b ab and hep c.  Will do with next lab.      Patient Active Problem List    Diagnosis Date Noted   • Obesity (BMI 35.0-39.9 without comorbidity) (HCC) 01/09/2019   • IFG (impaired fasting glucose) 01/05/2019   • Obesity (BMI 30-39.9) 07/13/2017   • Allergy to pollen    • Hypertension 02/22/2012   • Syncope 05/20/2010   • Hypothyroid 04/22/2009   • Pulmonary nodule 04/22/2009   • Dyslipidemia 04/22/2009   • Pruritic dermatitis 04/22/2009   • History of diverticulitis of colon 04/22/2009   • Carpal tunnel syndrome 04/22/2009   • Adenomatous polyp of colon 04/22/2009   • Preventative health care 04/22/2009   • GERD (gastroesophageal reflux disease) 04/22/2009       Current medicines (including changes today)  Current Outpatient Prescriptions   Medication Sig Dispense Refill   • acyclovir (ZOVIRAX) 400 MG tablet TAKE 1 TABLET BY MOUTH TWICE A  Tab 2   • albuterol 108 (90 Base) MCG/ACT Aero Soln inhalation aerosol Inhale 2 Puffs by mouth every 6 hours as needed for Shortness of Breath. 8.5 g 1   • atorvastatin (LIPITOR) 10 MG Tab Take 1 Tab by mouth every 48 hours. 50 Tab 3   • levothyroxine (SYNTHROID) 125 MCG Tab Take 1 Tab by mouth every day. 90 Tab 3   • Calcium Carb-Cholecalciferol (CALCIUM 500 +D PO) Take 3 Tabs by mouth every evening.     • Omega 3  "1000 MG Cap Take 3 Caps by mouth every evening.     • B Complex Cap Take 1 Cap by mouth every day. 30 Cap 0   • Psyllium (METAMUCIL) 0.52 GM CAPS Take 5 Caps by mouth every day.     • OMEPRAZOLE 20 MG PO CPDR Take 20 mg by mouth as needed (For heartburn).  11   • lisinopril (PRINIVIL) 10 MG Tab TAKE 1 TAB BY MOUTH 2 TIMES A DAY 60 Tab 0     No current facility-administered medications for this visit.        Allergies   Allergen Reactions   • Morphine Hives and Itching   • Sulfa Drugs Hives and Itching       ROS  Constitutional: Negative. Negative for fever, chills, weight loss, malaise/fatigue and diaphoresis.   HENT: Negative. Negative for hearing loss, ear pain, nosebleeds, congestion, sore throat, neck pain, tinnitus and ear discharge.   Respiratory: Negative. Negative for cough, hemoptysis, sputum production, shortness of breath, wheezing and stridor.   Cardiovascular: Negative. Negative for chest pain, palpitations, orthopnea, claudication, leg swelling and PND.   Gastrointestinal: Denies nausea, vomiting, diarrhea, constipation, heartburn, melena or hematochezia.  Genitourinary: Denies dysuria, hematuria, urinary incontinence, frequency or urgency.        Objective:     /76   Pulse 67   Temp 36.2 °C (97.2 °F) (Temporal)   Ht 1.651 m (5' 5\")   Wt 97.5 kg (215 lb)   SpO2 97%  Body mass index is 35.78 kg/m².    Physical Exam:  Vitals reviewed.  Constitutional: Oriented to person, place, and time. appears well-developed and well-nourished. No distress.   Cardiovascular: Normal rate, regular rhythm, normal heart sounds and intact distal pulses. Exam reveals no gallop and no friction rub. No murmur heard. No carotid bruits.   Pulmonary/Chest: Effort normal and breath sounds normal. No stridor. No respiratory distress. no wheezes or rales. exhibits no tenderness.   Musculoskeletal: Normal range of motion. exhibits no edema. norma pedal pulses 2+.  Lymphadenopathy: No cervical or supraclavicular adenopathy. "   Neurological: Alert and oriented to person, place, and time. exhibits normal muscle tone.  Skin: Skin is warm and dry. No diaphoresis.   Psychiatric: Normal mood and affect. Behavior is normal.      Assessment and Plan:     The following treatment plan was discussed:    1. IFG (impaired fasting glucose)  MICROALBUMIN CREAT RATIO URINE    HEMOGLOBIN A1C    new finding.  dec complex carbs and candy in diet.  inc exercise.  plan recheck lab 6 mo.  f/u for review   2. Acquired hypothyroidism      lab now wnl.  stable on meds.    3. Elevated LFTs  HEPATIC FUNCTION PANEL    single mild elevated.  will recheck 6 mo.   4. Hyperlipidemia LDL goal <100  Lipid Profile    LDL improved but not at goal.  recheck lab 6 mo.  take meds qod.    5. Encounter for hepatitis C screening test for low risk patient  HEP C VIRUS ANTIBODY   6. Need for hepatitis B screening test  HEP B SURFACE AB         Followup: Return in about 6 months (around 12/25/2019).

## 2019-08-15 ENCOUNTER — HOSPITAL ENCOUNTER (OUTPATIENT)
Dept: HOSPITAL 8 - OUT | Age: 64
Discharge: HOME | End: 2019-08-15
Attending: ORTHOPAEDIC SURGERY
Payer: COMMERCIAL

## 2019-08-15 VITALS — HEIGHT: 65 IN | WEIGHT: 217.6 LBS | BODY MASS INDEX: 36.25 KG/M2

## 2019-08-15 VITALS — DIASTOLIC BLOOD PRESSURE: 83 MMHG | SYSTOLIC BLOOD PRESSURE: 154 MMHG

## 2019-08-15 DIAGNOSIS — Z79.890: ICD-10-CM

## 2019-08-15 DIAGNOSIS — K21.9: ICD-10-CM
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
## 2019-08-15 DIAGNOSIS — J45.909: ICD-10-CM

## 2019-08-15 DIAGNOSIS — M67.51: ICD-10-CM

## 2019-08-15 DIAGNOSIS — E66.9: ICD-10-CM
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
## 2019-08-15 DIAGNOSIS — Z90.710: ICD-10-CM

## 2019-08-15 DIAGNOSIS — S83.231A: Primary | ICD-10-CM

## 2019-08-15 DIAGNOSIS — M22.41: ICD-10-CM

## 2019-08-15 DIAGNOSIS — Z88.2: ICD-10-CM

## 2019-08-15 DIAGNOSIS — I10: ICD-10-CM

## 2019-08-15 DIAGNOSIS — Z79.899: ICD-10-CM

## 2019-08-15 DIAGNOSIS — M71.21: ICD-10-CM

## 2019-08-15 DIAGNOSIS — Z79.1: ICD-10-CM

## 2019-08-15 DIAGNOSIS — Z88.5: ICD-10-CM
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
## 2019-08-15 DIAGNOSIS — M11.261: ICD-10-CM

## 2019-08-15 DIAGNOSIS — Z79.891: ICD-10-CM

## 2019-08-15 DIAGNOSIS — E78.5: ICD-10-CM
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
## 2019-08-15 DIAGNOSIS — M17.11: ICD-10-CM

## 2019-08-15 DIAGNOSIS — Z98.51: ICD-10-CM

## 2019-08-15 DIAGNOSIS — Z91.013: ICD-10-CM

## 2019-08-15 PROCEDURE — 93005 ELECTROCARDIOGRAM TRACING: CPT

## 2019-08-15 PROCEDURE — 29881 ARTHRS KNE SRG MNISECTMY M/L: CPT

## 2020-01-06 DIAGNOSIS — E78.5 HYPERLIPIDEMIA LDL GOAL <100: ICD-10-CM

## 2020-01-06 DIAGNOSIS — I10 ESSENTIAL HYPERTENSION: Chronic | ICD-10-CM

## 2020-01-06 DIAGNOSIS — B02.8 HERPES ZOSTER WITH COMPLICATION: ICD-10-CM

## 2020-01-06 RX ORDER — ACYCLOVIR 400 MG/1
400 TABLET ORAL 2 TIMES DAILY
Qty: 180 TAB | Refills: 1 | Status: SHIPPED | OUTPATIENT
Start: 2020-01-06 | End: 2020-03-15

## 2020-01-06 RX ORDER — LEVOTHYROXINE SODIUM 0.12 MG/1
125 TABLET ORAL
Qty: 90 TAB | Refills: 1 | Status: SHIPPED | OUTPATIENT
Start: 2020-01-06 | End: 2020-04-12 | Stop reason: SDUPTHER

## 2020-01-06 RX ORDER — ATORVASTATIN CALCIUM 10 MG/1
10 TABLET, FILM COATED ORAL
Qty: 50 TAB | Refills: 1 | Status: SHIPPED | OUTPATIENT
Start: 2020-01-06 | End: 2020-09-15 | Stop reason: SDUPTHER

## 2020-01-06 RX ORDER — LISINOPRIL 10 MG/1
10 TABLET ORAL 2 TIMES DAILY
Qty: 180 TAB | Refills: 1 | Status: SHIPPED | OUTPATIENT
Start: 2020-01-06 | End: 2020-04-12 | Stop reason: SDUPTHER

## 2020-03-15 DIAGNOSIS — I10 ESSENTIAL HYPERTENSION: Chronic | ICD-10-CM

## 2020-03-15 DIAGNOSIS — E03.9 ACQUIRED HYPOTHYROIDISM: ICD-10-CM

## 2020-03-15 DIAGNOSIS — B02.8 HERPES ZOSTER WITH COMPLICATION: ICD-10-CM

## 2020-03-15 RX ORDER — ACYCLOVIR 400 MG/1
TABLET ORAL
Qty: 30 TAB | Refills: 0 | Status: SHIPPED | OUTPATIENT
Start: 2020-03-15 | End: 2020-03-30

## 2020-03-15 NOTE — LETTER
March 16, 2020        Elizabeth Nuñez  [address]  [facility] [address]        Dear Elizabeth:    We have received a request from your pharmacy to refill your prescription(s). After careful review of your chart, we have noted you are due for labs and a follow up appointment.  We request you call our office at [phone] at your earliest convenience and make an appointment. I have included a fasting lab order for you.    However, when patients are not followed closely by their physician, potential medication complications may go unadressed. We look forward to scheduling an appointment for you, so that we may provide you with the safest and most complete medical care.        If you have any questions or concerns, please don't hesitate to call.        Sincerely,        RHEA Carrera.    Electronically Signed

## 2020-03-29 DIAGNOSIS — B02.8 HERPES ZOSTER WITH COMPLICATION: ICD-10-CM

## 2020-03-30 RX ORDER — ACYCLOVIR 400 MG/1
TABLET ORAL
Qty: 60 TAB | Refills: 0 | Status: SHIPPED
Start: 2020-03-30 | End: 2020-06-09 | Stop reason: SDUPTHER

## 2020-03-30 NOTE — TELEPHONE ENCOUNTER
Received request via: Pharmacy    Was the patient seen in the last year in this department? Yes  6/25/2019    Does the patient have an active prescription (recently filled or refills available) for medication(s) requested? Refills given on 3/15/2020 but only for 30 day supply, pt requests QTY of 180, or 90 day supply

## 2020-04-12 DIAGNOSIS — I10 ESSENTIAL HYPERTENSION: Chronic | ICD-10-CM

## 2020-04-12 RX ORDER — LISINOPRIL 10 MG/1
10 TABLET ORAL 2 TIMES DAILY
Qty: 60 TAB | Refills: 0 | Status: SHIPPED | OUTPATIENT
Start: 2020-04-12 | End: 2020-06-04

## 2020-04-12 RX ORDER — LEVOTHYROXINE SODIUM 0.12 MG/1
125 TABLET ORAL
Qty: 30 TAB | Refills: 0 | Status: SHIPPED | OUTPATIENT
Start: 2020-04-12 | End: 2020-08-03

## 2020-06-03 LAB
ALBUMIN SERPL-MCNC: 4.3 G/DL (ref 3.8–4.8)
ALBUMIN/CREAT UR: <9 MG/G CREAT (ref 0–29)
ALBUMIN/GLOB SERPL: 1.7 {RATIO} (ref 1.2–2.2)
ALP SERPL-CCNC: 83 IU/L (ref 39–117)
ALT SERPL-CCNC: 32 IU/L (ref 0–32)
AST SERPL-CCNC: 25 IU/L (ref 0–40)
BILIRUB DIRECT SERPL-MCNC: 0.16 MG/DL (ref 0–0.4)
BILIRUB SERPL-MCNC: 0.7 MG/DL (ref 0–1.2)
BUN SERPL-MCNC: 11 MG/DL (ref 8–27)
BUN/CREAT SERPL: 19 (ref 12–28)
CALCIUM SERPL-MCNC: 9.6 MG/DL (ref 8.7–10.3)
CHLORIDE SERPL-SCNC: 102 MMOL/L (ref 96–106)
CHOLEST SERPL-MCNC: 201 MG/DL (ref 100–199)
CO2 SERPL-SCNC: 22 MMOL/L (ref 20–29)
CREAT SERPL-MCNC: 0.59 MG/DL (ref 0.57–1)
CREAT UR-MCNC: 33.4 MG/DL
GLOBULIN SER CALC-MCNC: 2.6 G/DL (ref 1.5–4.5)
GLUCOSE SERPL-MCNC: 100 MG/DL (ref 65–99)
HBV SURFACE AB SER QL: REACTIVE
HCV AB S/CO SERPL IA: <0.1 S/CO RATIO (ref 0–0.9)
HDLC SERPL-MCNC: 61 MG/DL
LABORATORY COMMENT REPORT: ABNORMAL
LDLC SERPL CALC-MCNC: 117 MG/DL (ref 0–99)
MICROALBUMIN UR-MCNC: <3 UG/ML
POTASSIUM SERPL-SCNC: 4.6 MMOL/L (ref 3.5–5.2)
PROT SERPL-MCNC: 6.9 G/DL (ref 6–8.5)
SODIUM SERPL-SCNC: 138 MMOL/L (ref 134–144)
T4 FREE SERPL-MCNC: 1.55 NG/DL (ref 0.82–1.77)
TRIGL SERPL-MCNC: 114 MG/DL (ref 0–149)
TSH SERPL DL<=0.005 MIU/L-ACNC: 3.09 UIU/ML (ref 0.45–4.5)
VLDLC SERPL CALC-MCNC: 23 MG/DL (ref 5–40)

## 2020-06-04 DIAGNOSIS — I10 ESSENTIAL HYPERTENSION: Chronic | ICD-10-CM

## 2020-06-04 RX ORDER — LISINOPRIL 10 MG/1
TABLET ORAL
Qty: 60 TAB | Refills: 0 | Status: SHIPPED | OUTPATIENT
Start: 2020-06-04 | End: 2020-07-01

## 2020-06-04 NOTE — TELEPHONE ENCOUNTER
Received request via: Pharmacy    Was the patient seen in the last year in this department? Yes 6/25/2019    Does the patient have an active prescription (recently filled or refills available) for medication(s) requested? No

## 2020-06-09 ENCOUNTER — OFFICE VISIT (OUTPATIENT)
Dept: MEDICAL GROUP | Facility: MEDICAL CENTER | Age: 65
End: 2020-06-09
Payer: COMMERCIAL

## 2020-06-09 VITALS
HEIGHT: 65 IN | BODY MASS INDEX: 36.82 KG/M2 | WEIGHT: 221 LBS | TEMPERATURE: 97.4 F | DIASTOLIC BLOOD PRESSURE: 80 MMHG | OXYGEN SATURATION: 94 % | SYSTOLIC BLOOD PRESSURE: 130 MMHG | HEART RATE: 61 BPM

## 2020-06-09 DIAGNOSIS — B02.8 HERPES ZOSTER WITH COMPLICATION: ICD-10-CM

## 2020-06-09 DIAGNOSIS — E66.9 OBESITY (BMI 35.0-39.9 WITHOUT COMORBIDITY): ICD-10-CM

## 2020-06-09 DIAGNOSIS — E78.5 HYPERLIPIDEMIA LDL GOAL <100: ICD-10-CM

## 2020-06-09 DIAGNOSIS — N95.9 POST MENOPAUSAL PROBLEMS: ICD-10-CM

## 2020-06-09 DIAGNOSIS — M19.90 ARTHRITIS: ICD-10-CM

## 2020-06-09 PROCEDURE — 99214 OFFICE O/P EST MOD 30 MIN: CPT | Performed by: NURSE PRACTITIONER

## 2020-06-09 RX ORDER — CELECOXIB 100 MG/1
100 CAPSULE ORAL 2 TIMES DAILY
Qty: 60 CAP | Refills: 1 | Status: SHIPPED | OUTPATIENT
Start: 2020-06-09 | End: 2020-09-15

## 2020-06-09 RX ORDER — ACYCLOVIR 400 MG/1
TABLET ORAL
Qty: 180 TAB | Refills: 3 | Status: SHIPPED | OUTPATIENT
Start: 2020-06-09 | End: 2021-06-04

## 2020-06-09 ASSESSMENT — PATIENT HEALTH QUESTIONNAIRE - PHQ9: CLINICAL INTERPRETATION OF PHQ2 SCORE: 0

## 2020-06-09 NOTE — PROGRESS NOTES
Subjective:     Elizabeth Nuñez is a 64 y.o. female who presents with hyperlipidemia.    HPI:   Seen in f/u for hyperlipidemia.  She needs refill on her acyclovir.  She is using all the time d/t episode of shingles in her left ear, eye and face.    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  She was last in [place] in dec.  Was supposed to go back in feb but did not d/t virus.  Doesn't know if/when she will ever go back.  She is due her mammo and dexa.  She has her mammo sched on saturday.  Will add dexa.  She is having much worse joint pain since not able to exercise during lockdown.  She has in all her joints.  She is using advil or aleve.  It is not helping much.  She has not restarted exercise yet but planning on doing that soon.  She is not able to sleep with the pain.  Voltaren gel does not help.  She is going to try acupuncture now before continuing going up on nsaids.  She has a hx of colitis and doesn't want to upset her stomach more.    She is on lipitor but d/t her joint pain she didn't exercise.  She stopped taking her lipitor to avoid the muscle aches that occur with it to dec her overall pain.  She will restart the lipitor taking qod.  trg and HDL are at goal.   Reviewed lab with pt.  Her LP shows LDL is up from last time since off lipitor.  Was 113 and now 117.  Goal is <100.    Direct bili, hep c, GFR, TSH, T4, alb/cr ratio, CMP is wnl  She is immune to hep b.           Patient Active Problem List    Diagnosis Date Noted   • Obesity (BMI 35.0-39.9 without comorbidity) ([facility]) 06/09/2020   • IFG (impaired fasting glucose) 01/05/2019   • Obesity (BMI 30-39.9) 07/13/2017   • Allergy to pollen    • Hypertension 02/22/2012   • Syncope 05/20/2010   • Hypothyroid 04/22/2009   • Pulmonary nodule 04/22/2009   • Dyslipidemia 04/22/2009   • Pruritic dermatitis 04/22/2009   • History of diverticulitis of colon 04/22/2009   • Carpal tunnel syndrome 04/22/2009   • Adenomatous  polyp of colon 04/22/2009   • GERD (gastroesophageal reflux disease) 04/22/2009       Current medicines (including changes today)  Current Outpatient Medications   Medication Sig Dispense Refill   • acyclovir (ZOVIRAX) 400 MG tablet TAKE 1 TABLET BY MOUTH TWICE A  Tab 3   • celecoxib (CELEBREX) 100 MG Cap Take 1 Cap by mouth 2 times a day. 60 Cap 1   • lisinopril (PRINIVIL) 10 MG Tab TAKE 1 TAB BY MOUTH 2 TIMES A DAY. 60 Tab 0   • levothyroxine (SYNTHROID) 125 MCG Tab Take 1 Tab by mouth every day. 30 Tab 0   • atorvastatin (LIPITOR) 10 MG Tab Take 1 Tab by mouth every 48 hours. 50 Tab 1   • albuterol 108 (90 Base) MCG/ACT Aero Soln inhalation aerosol Inhale 2 Puffs by mouth every 6 hours as needed for Shortness of Breath. 8.5 g 1   • Calcium Carb-Cholecalciferol (CALCIUM 500 +D PO) Take 3 Tabs by mouth every evening.     • B Complex Cap Take 1 Cap by mouth every day. 30 Cap 0   • Psyllium (METAMUCIL) 0.52 GM CAPS Take 5 Caps by mouth every day.     • OMEPRAZOLE 20 MG PO CPDR Take 20 mg by mouth as needed (For heartburn).  11     No current facility-administered medications for this visit.        Allergies   Allergen Reactions   • Morphine Hives and Itching   • Sulfa Drugs Hives and Itching       ROS  Constitutional: Negative. Negative for fever, chills, weight loss, malaise/fatigue and diaphoresis.   HENT: Negative. Negative for hearing loss, ear pain, nosebleeds, congestion, sore throat, neck pain, tinnitus and ear discharge.   Respiratory: Negative. Negative for cough, hemoptysis, sputum production, shortness of breath, wheezing and stridor.   Cardiovascular: Negative. Negative for chest pain, palpitations, orthopnea, claudication, leg swelling and PND.   Gastrointestinal: Denies nausea, vomiting, diarrhea, constipation, heartburn, melena or hematochezia.  Genitourinary: Denies dysuria, hematuria, urinary incontinence, frequency or urgency.        Objective:     /80 (BP Location: Left arm, Patient  "Position: Sitting)   Pulse 61   Temp 36.3 °C (97.4 °F) (Temporal)   Ht 1.651 m (5' 5\")   Wt 100.2 kg (221 lb)   SpO2 94%  Body mass index is 36.78 kg/m².    Physical Exam:  Vitals reviewed.  Constitutional: Oriented to person, place, and time. appears well-developed and well-nourished. No distress.   Cardiovascular: Normal rate, regular rhythm, normal heart sounds and intact distal pulses. Exam reveals no gallop and no friction rub. No murmur heard. No carotid bruits.   Pulmonary/Chest: Effort normal and breath sounds normal. No stridor. No respiratory distress. no wheezes or rales. exhibits no tenderness.   Musculoskeletal: Normal range of motion. exhibits no edema. norma pedal pulses 2+.  Lymphadenopathy: No cervical or supraclavicular adenopathy.   Neurological: Alert and oriented to person, place, and time. exhibits normal muscle tone.  Skin: Skin is warm and dry. No diaphoresis.   Psychiatric: Normal mood and affect. Behavior is normal.      Assessment and Plan:     The following treatment plan was discussed:    1. Hyperlipidemia LDL goal <100  HEPATIC FUNCTION PANEL    Lipid Profile    restart lipitor qod consistently.  recheck LP, HFP in 3 mo.  f/u for review.     2. Arthritis  celecoxib (CELEBREX) 100 MG Cap    having significant pain but doesn't want to take too many nsaids d/t stomach upset.  will try celebrex   3. Herpes zoster with complication  acyclovir (ZOVIRAX) 400 MG tablet    refilled acyclovir for chronic use d/t recent facial shingles.     4. Obesity (BMI 35.0-39.9 without comorbidity)  Patient identified as having weight management issue.  Appropriate orders and counseling given.   5. Post menopausal problems  DS-BONE DENSITY STUDY (DEXA)    dexa scan         Followup: Return in about 3 months (around 9/9/2020).  "

## 2020-06-12 ENCOUNTER — APPOINTMENT (RX ONLY)
Dept: URBAN - METROPOLITAN AREA CLINIC 35 | Facility: CLINIC | Age: 65
Setting detail: DERMATOLOGY
End: 2020-06-12

## 2020-06-12 DIAGNOSIS — Z71.89 OTHER SPECIFIED COUNSELING: ICD-10-CM

## 2020-06-12 DIAGNOSIS — L81.4 OTHER MELANIN HYPERPIGMENTATION: ICD-10-CM

## 2020-06-12 DIAGNOSIS — D22 MELANOCYTIC NEVI: ICD-10-CM

## 2020-06-12 DIAGNOSIS — L82.1 OTHER SEBORRHEIC KERATOSIS: ICD-10-CM

## 2020-06-12 PROBLEM — D22.5 MELANOCYTIC NEVI OF TRUNK: Status: ACTIVE | Noted: 2020-06-12

## 2020-06-12 PROCEDURE — 99213 OFFICE O/P EST LOW 20 MIN: CPT

## 2020-06-12 PROCEDURE — ? COUNSELING

## 2020-06-12 ASSESSMENT — LOCATION DETAILED DESCRIPTION DERM
LOCATION DETAILED: RIGHT INFERIOR MEDIAL UPPER BACK
LOCATION DETAILED: SUPERIOR THORACIC SPINE
LOCATION DETAILED: RIGHT MEDIAL UPPER BACK
LOCATION DETAILED: RIGHT SUPERIOR MEDIAL MIDBACK

## 2020-06-12 ASSESSMENT — LOCATION SIMPLE DESCRIPTION DERM
LOCATION SIMPLE: UPPER BACK
LOCATION SIMPLE: RIGHT UPPER BACK
LOCATION SIMPLE: RIGHT LOWER BACK

## 2020-06-12 ASSESSMENT — LOCATION ZONE DERM: LOCATION ZONE: TRUNK

## 2020-06-13 ENCOUNTER — HOSPITAL ENCOUNTER (OUTPATIENT)
Dept: RADIOLOGY | Facility: MEDICAL CENTER | Age: 65
End: 2020-06-13
Attending: OBSTETRICS & GYNECOLOGY
Payer: COMMERCIAL

## 2020-06-13 DIAGNOSIS — Z12.31 VISIT FOR SCREENING MAMMOGRAM: ICD-10-CM

## 2020-06-13 PROCEDURE — 77067 SCR MAMMO BI INCL CAD: CPT

## 2020-07-01 DIAGNOSIS — I10 ESSENTIAL HYPERTENSION: Chronic | ICD-10-CM

## 2020-07-01 RX ORDER — LISINOPRIL 10 MG/1
TABLET ORAL
Qty: 60 TAB | Refills: 0 | Status: SHIPPED | OUTPATIENT
Start: 2020-07-01 | End: 2020-07-26

## 2020-07-01 NOTE — LETTER
July 1, 2020        Elizabeth Nuñez  [address]  [facility] [address]        Dear Elizabeth:    After careful review of your chart, we have noted you are due for a follow up appointment.  We request you call our office at [phone] at your earliest convenience and make an appointment. I have included a fasting lab order for you.      We look forward to scheduling an appointment for you, so that we may provide you with the safest and most complete medical care.        If you have any questions or concerns, please don't hesitate to call.        Sincerely,        RHEA Carrera.    Electronically Signed

## 2020-07-10 ENCOUNTER — HOSPITAL ENCOUNTER (OUTPATIENT)
Dept: RADIOLOGY | Facility: MEDICAL CENTER | Age: 65
End: 2020-07-10
Attending: NURSE PRACTITIONER
Payer: COMMERCIAL

## 2020-07-10 DIAGNOSIS — N95.9 POST MENOPAUSAL PROBLEMS: ICD-10-CM

## 2020-07-10 PROCEDURE — 77080 DXA BONE DENSITY AXIAL: CPT

## 2020-08-25 DIAGNOSIS — Z11.59 SCREENING FOR VIRAL DISEASE: ICD-10-CM

## 2020-09-05 LAB
ALBUMIN SERPL-MCNC: 4.4 G/DL (ref 3.8–4.8)
ALP SERPL-CCNC: 84 IU/L (ref 39–117)
ALT SERPL-CCNC: 29 IU/L (ref 0–32)
AST SERPL-CCNC: 23 IU/L (ref 0–40)
BILIRUB DIRECT SERPL-MCNC: 0.19 MG/DL (ref 0–0.4)
BILIRUB SERPL-MCNC: 0.7 MG/DL (ref 0–1.2)
CHOLEST SERPL-MCNC: 176 MG/DL (ref 100–199)
HDLC SERPL-MCNC: 56 MG/DL
LABORATORY COMMENT REPORT: ABNORMAL
LDLC SERPL CALC-MCNC: 103 MG/DL (ref 0–99)
PROT SERPL-MCNC: 7.1 G/DL (ref 6–8.5)
SARS-COV-2 IGG SERPL QL IA: NEGATIVE
TRIGL SERPL-MCNC: 92 MG/DL (ref 0–149)
VLDLC SERPL CALC-MCNC: 17 MG/DL (ref 5–40)

## 2020-09-15 ENCOUNTER — HOSPITAL ENCOUNTER (OUTPATIENT)
Dept: RADIOLOGY | Facility: MEDICAL CENTER | Age: 65
End: 2020-09-15
Attending: NURSE PRACTITIONER
Payer: COMMERCIAL

## 2020-09-15 ENCOUNTER — HOSPITAL ENCOUNTER (OUTPATIENT)
Dept: LAB | Facility: MEDICAL CENTER | Age: 65
End: 2020-09-15
Attending: NURSE PRACTITIONER
Payer: COMMERCIAL

## 2020-09-15 ENCOUNTER — OFFICE VISIT (OUTPATIENT)
Dept: MEDICAL GROUP | Facility: MEDICAL CENTER | Age: 65
End: 2020-09-15
Payer: COMMERCIAL

## 2020-09-15 ENCOUNTER — TELEPHONE (OUTPATIENT)
Dept: MEDICAL GROUP | Facility: MEDICAL CENTER | Age: 65
End: 2020-09-15

## 2020-09-15 VITALS
BODY MASS INDEX: 37.49 KG/M2 | SYSTOLIC BLOOD PRESSURE: 110 MMHG | WEIGHT: 225 LBS | OXYGEN SATURATION: 96 % | TEMPERATURE: 95.2 F | HEART RATE: 64 BPM | DIASTOLIC BLOOD PRESSURE: 80 MMHG | HEIGHT: 65 IN

## 2020-09-15 DIAGNOSIS — M25.50 ARTHRALGIA, UNSPECIFIED JOINT: ICD-10-CM

## 2020-09-15 DIAGNOSIS — R22.32 ELBOW MASS, LEFT: ICD-10-CM

## 2020-09-15 DIAGNOSIS — M79.641 PAIN IN BOTH HANDS: ICD-10-CM

## 2020-09-15 DIAGNOSIS — M79.642 PAIN IN BOTH HANDS: ICD-10-CM

## 2020-09-15 DIAGNOSIS — E78.5 HYPERLIPIDEMIA LDL GOAL <100: ICD-10-CM

## 2020-09-15 LAB — RHEUMATOID FACT SER IA-ACNC: 278 IU/ML (ref 0–14)

## 2020-09-15 PROCEDURE — 73130 X-RAY EXAM OF HAND: CPT | Mod: LT

## 2020-09-15 PROCEDURE — 99214 OFFICE O/P EST MOD 30 MIN: CPT | Performed by: NURSE PRACTITIONER

## 2020-09-15 PROCEDURE — 73110 X-RAY EXAM OF WRIST: CPT | Mod: LT

## 2020-09-15 PROCEDURE — 86038 ANTINUCLEAR ANTIBODIES: CPT

## 2020-09-15 PROCEDURE — 73650 X-RAY EXAM OF HEEL: CPT | Mod: LT

## 2020-09-15 PROCEDURE — 36415 COLL VENOUS BLD VENIPUNCTURE: CPT

## 2020-09-15 PROCEDURE — 73080 X-RAY EXAM OF ELBOW: CPT | Mod: LT

## 2020-09-15 PROCEDURE — 86235 NUCLEAR ANTIGEN ANTIBODY: CPT

## 2020-09-15 PROCEDURE — 86039 ANTINUCLEAR ANTIBODIES (ANA): CPT

## 2020-09-15 PROCEDURE — 73130 X-RAY EXAM OF HAND: CPT | Mod: RT

## 2020-09-15 PROCEDURE — 86225 DNA ANTIBODY NATIVE: CPT

## 2020-09-15 PROCEDURE — 86431 RHEUMATOID FACTOR QUANT: CPT

## 2020-09-15 PROCEDURE — 86200 CCP ANTIBODY: CPT

## 2020-09-15 RX ORDER — ATORVASTATIN CALCIUM 10 MG/1
10 TABLET, FILM COATED ORAL
Qty: 50 TAB | Refills: 1 | Status: SHIPPED | OUTPATIENT
Start: 2020-09-15 | End: 2021-03-25

## 2020-09-15 NOTE — PROGRESS NOTES
Subjective:     Elizabeth Nuñez is a 65 y.o. female who presents with IFG.    HPI:   Seen in f/u for IGF.  She is not feelig well.  Having more pain.  She is concerned that she may have RA.  She has tender warm swollen joint in hands and feet.  + she is having chronic stiff joints, mostly in hands, wrist and knees.  She has a new bump left elbow and left heel.   She also has intermittent dry eyes.    She had her flu shot recently.  She is on lipitor. hust restarted after last appt.  Stable on med but needs refill.  She tries to eat healthy.  Exercising a little bit more than before.  Reviewed lab with pt.  HFP, COVID AB is wnl  LP shows trg and HDL are all at goal.  LDL is down to 103 from 117.  Goal is <100.        Patient Active Problem List    Diagnosis Date Noted   • Obesity (BMI 35.0-39.9 without comorbidity) ([facility]) 06/09/2020   • IFG (impaired fasting glucose) 01/05/2019   • Obesity (BMI 30-39.9) 07/13/2017   • Allergy to pollen    • Hypertension 02/22/2012   • Syncope 05/20/2010   • Hypothyroid 04/22/2009   • Pulmonary nodule 04/22/2009   • Dyslipidemia 04/22/2009   • Pruritic dermatitis 04/22/2009   • History of diverticulitis of colon 04/22/2009   • Carpal tunnel syndrome 04/22/2009   • Adenomatous polyp of colon 04/22/2009   • GERD (gastroesophageal reflux disease) 04/22/2009       Current medicines (including changes today)  Current Outpatient Medications   Medication Sig Dispense Refill   • atorvastatin (LIPITOR) 10 MG Tab Take 1 Tab by mouth every 48 hours. 50 Tab 1   • levothyroxine (SYNTHROID) 125 MCG Tab TAKE 1 TABLET BY MOUTH EVERY DAY 90 Tab 0   • lisinopril (PRINIVIL) 10 MG Tab TAKE 1 TABLET BY MOUTH TWICE A  Tab 0   • acyclovir (ZOVIRAX) 400 MG tablet TAKE 1 TABLET BY MOUTH TWICE A  Tab 3   • albuterol 108 (90 Base) MCG/ACT Aero Soln inhalation aerosol Inhale 2 Puffs by mouth every 6 hours as needed for Shortness of Breath. 8.5 g 1   • Calcium Carb-Cholecalciferol (CALCIUM  "500 +D PO) Take 3 Tabs by mouth every evening.     • B Complex Cap Take 1 Cap by mouth every day. 30 Cap 0   • Psyllium (METAMUCIL) 0.52 GM CAPS Take 5 Caps by mouth every day.     • OMEPRAZOLE 20 MG PO CPDR Take 20 mg by mouth as needed (For heartburn).  11     No current facility-administered medications for this visit.        Allergies   Allergen Reactions   • Morphine Hives and Itching   • Sulfa Drugs Hives and Itching       ROS  Constitutional: Negative. Negative for fever, chills, weight loss, malaise/fatigue and diaphoresis.   HENT: Negative. Negative for hearing loss, ear pain, nosebleeds, congestion, sore throat, neck pain, tinnitus and ear discharge.   Respiratory: Negative. Negative for cough, hemoptysis, sputum production, shortness of breath, wheezing and stridor.   Cardiovascular: Negative. Negative for chest pain, palpitations, orthopnea, claudication, leg swelling and PND.   Gastrointestinal: Denies nausea, vomiting, diarrhea, constipation, heartburn, melena or hematochezia.  Genitourinary: Denies dysuria, hematuria, urinary incontinence, frequency or urgency.        Objective:     /80 (BP Location: Right arm, Patient Position: Sitting)   Pulse 64   Temp (!) 35.1 °C (95.2 °F) (Temporal)   Ht 1.651 m (5' 5\")   Wt 102.1 kg (225 lb)   SpO2 96%  Body mass index is 37.44 kg/m².    Physical Exam:  Vitals reviewed.  Constitutional: Oriented to person, place, and time. appears well-developed and well-nourished. No distress.   Cardiovascular: Normal rate, regular rhythm, normal heart sounds and intact distal pulses. Exam reveals no gallop and no friction rub. No murmur heard. No carotid bruits.   Pulmonary/Chest: Effort normal and breath sounds normal. No stridor. No respiratory distress. no wheezes or rales. exhibits no tenderness.   Musculoskeletal: Normal range of motion. exhibits no edema. norma pedal pulses 2+.  Firm palp nodule on left elbow, no erythema.  Mild tenderness.  Firm mass noted left " heel w/o reddness.  Lymphadenopathy: No cervical or supraclavicular adenopathy.   Neurological: Alert and oriented to person, place, and time. exhibits normal muscle tone.  Skin: Skin is warm and dry. No diaphoresis.   Psychiatric: Normal mood and affect. Behavior is normal.      Assessment and Plan:     The following treatment plan was discussed:    1. Hyperlipidemia LDL goal <100  atorvastatin (LIPITOR) 10 MG Tab    stable on lipitor 10 mg 3x/wk.  refilled meds.   LP now at goal.  plan:  f/u yearly.  call for lab slip.  f/u sooner if lab/xrays abn   2. Arthralgia, unspecified joint  DX-OS CALCIS (HEEL) 2+ LEFT    DX-HAND 3+ LEFT    DX-HAND 3+ RIGHT    DX-WRIST-COMPLETE 3+ LEFT    DX-ELBOW-COMPLETE 3+ LEFT    RAMONA REFLEXIVE PROFILE    CCP ANTIBODY    RHEUMATOID ARTHRITIS FACTOR    she is concerned about having RA.  multiple sx consistent wiht RA.  do lab and xrays.  f/u w/pt with results.  refer as approp   3. Elbow mass, left  DX-ELBOW-COMPLETE 3+ LEFT    RAMONA REFLEXIVE PROFILE    CCP ANTIBODY    RHEUMATOID ARTHRITIS FACTOR   4. Pain in both hands  DX-HAND 3+ LEFT    DX-HAND 3+ RIGHT    RAMONA REFLEXIVE PROFILE    CCP ANTIBODY    RHEUMATOID ARTHRITIS FACTOR         Followup: Return in about 1 year (around 9/15/2021).

## 2020-09-16 ENCOUNTER — TELEPHONE (OUTPATIENT)
Dept: MEDICAL GROUP | Facility: MEDICAL CENTER | Age: 65
End: 2020-09-16

## 2020-09-16 DIAGNOSIS — M25.532 LEFT WRIST PAIN: ICD-10-CM

## 2020-09-16 DIAGNOSIS — M25.522 LEFT ELBOW PAIN: ICD-10-CM

## 2020-09-17 LAB
CCP IGG SERPL-ACNC: 59 UNITS (ref 0–19)
NUCLEAR IGG SER QL IA: DETECTED

## 2020-09-19 LAB
ANA INTERPRETIVE COMMENT Q5143: ABNORMAL
ANA PATTERN Q5144: ABNORMAL
ANA TITER Q5145: ABNORMAL
ANTINUCLEAR ANTIBODY (ANA), HEP-2, IGG Q5142: DETECTED
DSDNA AB TITR SER CLIF: NORMAL {TITER}
ENA JO1 AB TITR SER: 2 AU/ML (ref 0–40)
ENA SCL70 IGG SER QL: 6 AU/ML (ref 0–40)
ENA SM IGG SER-ACNC: 2 AU/ML (ref 0–40)
ENA SS-B IGG SER IA-ACNC: 122 AU/ML (ref 0–40)
SSA52 R0ENA AB IGG Q0420: 5 AU/ML (ref 0–40)
SSA60 R0ENA AB IGG Q0419: 81 AU/ML (ref 0–40)
U1 SNRNP IGG SER QL: 6 AU/ML (ref 0–40)

## 2020-09-20 ENCOUNTER — TELEPHONE (OUTPATIENT)
Dept: MEDICAL GROUP | Facility: MEDICAL CENTER | Age: 65
End: 2020-09-20

## 2020-09-20 DIAGNOSIS — M25.50 ARTHRALGIA, UNSPECIFIED JOINT: ICD-10-CM

## 2020-09-20 DIAGNOSIS — R76.8 POSITIVE ANA (ANTINUCLEAR ANTIBODY): ICD-10-CM

## 2020-09-20 NOTE — TELEPHONE ENCOUNTER
Please let pt know that the lab shows her RAMONA, RA FACTOR, and 2 tests for sjogrens is abn.  i don't know if she has rheumatoid arthritis or sjogrens.  i will place referral to rheumatology. If she has a specific specialist she wants let us know.

## 2020-12-16 ENCOUNTER — HOSPITAL ENCOUNTER (OUTPATIENT)
Dept: LAB | Facility: MEDICAL CENTER | Age: 65
End: 2020-12-16
Attending: INTERNAL MEDICINE
Payer: COMMERCIAL

## 2020-12-16 ENCOUNTER — HOSPITAL ENCOUNTER (OUTPATIENT)
Dept: RADIOLOGY | Facility: MEDICAL CENTER | Age: 65
End: 2020-12-16
Attending: INTERNAL MEDICINE
Payer: COMMERCIAL

## 2020-12-16 DIAGNOSIS — Z79.899 HIGH RISK MEDICATION USE: ICD-10-CM

## 2020-12-16 DIAGNOSIS — M05.79 RHEUMATOID ARTHRITIS INVOLVING MULTIPLE SITES WITH POSITIVE RHEUMATOID FACTOR (HCC): ICD-10-CM

## 2020-12-16 DIAGNOSIS — M35.00 SJOGREN'S SYNDROME, WITH UNSPECIFIED ORGAN INVOLVEMENT (HCC): ICD-10-CM

## 2020-12-16 LAB
ALBUMIN SERPL BCP-MCNC: 3.9 G/DL (ref 3.2–4.9)
ALBUMIN/GLOB SERPL: 1.1 G/DL
ALP SERPL-CCNC: 74 U/L (ref 30–99)
ALT SERPL-CCNC: 30 U/L (ref 2–50)
ANION GAP SERPL CALC-SCNC: 12 MMOL/L (ref 7–16)
APPEARANCE UR: CLEAR
AST SERPL-CCNC: 24 U/L (ref 12–45)
BASOPHILS # BLD AUTO: 0.5 % (ref 0–1.8)
BASOPHILS # BLD: 0.04 K/UL (ref 0–0.12)
BILIRUB SERPL-MCNC: 0.4 MG/DL (ref 0.1–1.5)
BILIRUB UR QL STRIP.AUTO: NEGATIVE
BUN SERPL-MCNC: 10 MG/DL (ref 8–22)
CALCIUM SERPL-MCNC: 9.8 MG/DL (ref 8.4–10.2)
CHLORIDE SERPL-SCNC: 105 MMOL/L (ref 96–112)
CO2 SERPL-SCNC: 25 MMOL/L (ref 20–33)
COLOR UR: YELLOW
CREAT SERPL-MCNC: 0.85 MG/DL (ref 0.5–1.4)
CREAT UR-MCNC: 240.58 MG/DL
CRP SERPL HS-MCNC: 0.75 MG/DL (ref 0–0.75)
EOSINOPHIL # BLD AUTO: 0.2 K/UL (ref 0–0.51)
EOSINOPHIL NFR BLD: 2.5 % (ref 0–6.9)
ERYTHROCYTE [DISTWIDTH] IN BLOOD BY AUTOMATED COUNT: 48.2 FL (ref 35.9–50)
ERYTHROCYTE [SEDIMENTATION RATE] IN BLOOD BY WESTERGREN METHOD: 15 MM/HOUR (ref 0–30)
FASTING STATUS PATIENT QL REPORTED: NORMAL
GLOBULIN SER CALC-MCNC: 3.6 G/DL (ref 1.9–3.5)
GLUCOSE SERPL-MCNC: 71 MG/DL (ref 65–99)
GLUCOSE UR STRIP.AUTO-MCNC: NEGATIVE MG/DL
HCT VFR BLD AUTO: 47.2 % (ref 37–47)
HGB BLD-MCNC: 15.5 G/DL (ref 12–16)
IMM GRANULOCYTES # BLD AUTO: 0.03 K/UL (ref 0–0.11)
IMM GRANULOCYTES NFR BLD AUTO: 0.4 % (ref 0–0.9)
KETONES UR STRIP.AUTO-MCNC: ABNORMAL MG/DL
LEUKOCYTE ESTERASE UR QL STRIP.AUTO: NEGATIVE
LYMPHOCYTES # BLD AUTO: 1.85 K/UL (ref 1–4.8)
LYMPHOCYTES NFR BLD: 22.8 % (ref 22–41)
MCH RBC QN AUTO: 29.5 PG (ref 27–33)
MCHC RBC AUTO-ENTMCNC: 32.8 G/DL (ref 33.6–35)
MCV RBC AUTO: 89.9 FL (ref 81.4–97.8)
MICRO URNS: ABNORMAL
MONOCYTES # BLD AUTO: 0.82 K/UL (ref 0–0.85)
MONOCYTES NFR BLD AUTO: 10.1 % (ref 0–13.4)
NEUTROPHILS # BLD AUTO: 5.18 K/UL (ref 2–7.15)
NEUTROPHILS NFR BLD: 63.7 % (ref 44–72)
NITRITE UR QL STRIP.AUTO: NEGATIVE
NRBC # BLD AUTO: 0 K/UL
NRBC BLD-RTO: 0 /100 WBC
PH UR STRIP.AUTO: 6 [PH] (ref 5–8)
PLATELET # BLD AUTO: 347 K/UL (ref 164–446)
PMV BLD AUTO: 9.5 FL (ref 9–12.9)
POTASSIUM SERPL-SCNC: 4.3 MMOL/L (ref 3.6–5.5)
PROT SERPL-MCNC: 7.5 G/DL (ref 6–8.2)
PROT UR QL STRIP: NEGATIVE MG/DL
PROT UR-MCNC: 12 MG/DL (ref 0–15)
PROT/CREAT UR: 50 MG/G (ref 10–107)
RBC # BLD AUTO: 5.25 M/UL (ref 4.2–5.4)
RBC UR QL AUTO: NEGATIVE
SODIUM SERPL-SCNC: 142 MMOL/L (ref 135–145)
SP GR UR STRIP.AUTO: 1.02
WBC # BLD AUTO: 8.1 K/UL (ref 4.8–10.8)

## 2020-12-16 PROCEDURE — 86704 HEP B CORE ANTIBODY TOTAL: CPT

## 2020-12-16 PROCEDURE — 85652 RBC SED RATE AUTOMATED: CPT

## 2020-12-16 PROCEDURE — 82784 ASSAY IGA/IGD/IGG/IGM EACH: CPT

## 2020-12-16 PROCEDURE — 71046 X-RAY EXAM CHEST 2 VIEWS: CPT

## 2020-12-16 PROCEDURE — 82955 ASSAY OF G6PD ENZYME: CPT

## 2020-12-16 PROCEDURE — 80053 COMPREHEN METABOLIC PANEL: CPT

## 2020-12-16 PROCEDURE — 85025 COMPLETE CBC W/AUTO DIFF WBC: CPT

## 2020-12-16 PROCEDURE — 81003 URINALYSIS AUTO W/O SCOPE: CPT

## 2020-12-16 PROCEDURE — 82570 ASSAY OF URINE CREATININE: CPT

## 2020-12-16 PROCEDURE — 84156 ASSAY OF PROTEIN URINE: CPT

## 2020-12-16 PROCEDURE — 86140 C-REACTIVE PROTEIN: CPT

## 2020-12-16 PROCEDURE — 73630 X-RAY EXAM OF FOOT: CPT | Mod: LT

## 2020-12-16 PROCEDURE — 86160 COMPLEMENT ANTIGEN: CPT | Mod: 91

## 2020-12-16 PROCEDURE — 87340 HEPATITIS B SURFACE AG IA: CPT

## 2020-12-16 PROCEDURE — 86431 RHEUMATOID FACTOR QUANT: CPT

## 2020-12-16 PROCEDURE — 36415 COLL VENOUS BLD VENIPUNCTURE: CPT

## 2020-12-16 PROCEDURE — 73630 X-RAY EXAM OF FOOT: CPT | Mod: RT

## 2020-12-16 PROCEDURE — 86480 TB TEST CELL IMMUN MEASURE: CPT

## 2020-12-17 LAB
C3 SERPL-MCNC: 136.4 MG/DL (ref 87–200)
C4 SERPL-MCNC: 18.6 MG/DL (ref 19–52)
HBV CORE AB SERPL QL IA: NONREACTIVE
HBV SURFACE AG SER QL: NORMAL
RHEUMATOID FACT SER IA-ACNC: 362 IU/ML (ref 0–14)

## 2020-12-18 LAB
G6PD RBC-CCNC: 13.8 U/G HB (ref 9.9–16.6)
GAMMA INTERFERON BACKGROUND BLD IA-ACNC: 0.06 IU/ML
IGA SERPL-MCNC: 335 MG/DL (ref 68–408)
IGG SERPL-MCNC: 1000 MG/DL (ref 768–1632)
IGM SERPL-MCNC: 161 MG/DL (ref 35–263)
M TB IFN-G BLD-IMP: NEGATIVE
M TB IFN-G CD4+ BCKGRND COR BLD-ACNC: 0.02 IU/ML
MITOGEN IGNF BCKGRD COR BLD-ACNC: >10 IU/ML
QFT TB2 - NIL TBQ2: 0.01 IU/ML

## 2021-01-08 ENCOUNTER — HOSPITAL ENCOUNTER (OUTPATIENT)
Dept: RADIOLOGY | Facility: MEDICAL CENTER | Age: 66
End: 2021-01-08
Attending: INTERNAL MEDICINE
Payer: COMMERCIAL

## 2021-01-08 ENCOUNTER — HOSPITAL ENCOUNTER (OUTPATIENT)
Dept: RADIOLOGY | Facility: MEDICAL CENTER | Age: 66
End: 2021-01-08
Attending: OTOLARYNGOLOGY
Payer: COMMERCIAL

## 2021-01-08 DIAGNOSIS — M05.79 RHEUMATOID ARTHRITIS INVOLVING MULTIPLE SITES WITH POSITIVE RHEUMATOID FACTOR (HCC): ICD-10-CM

## 2021-01-08 DIAGNOSIS — M35.00 SJOGREN'S SYNDROME, WITH UNSPECIFIED ORGAN INVOLVEMENT (HCC): ICD-10-CM

## 2021-01-08 DIAGNOSIS — R93.89 ABNORMAL CHEST X-RAY: ICD-10-CM

## 2021-01-08 DIAGNOSIS — K11.1 PAROTID GLAND ENLARGEMENT: ICD-10-CM

## 2021-01-08 PROCEDURE — 70491 CT SOFT TISSUE NECK W/DYE: CPT

## 2021-01-08 PROCEDURE — 700117 HCHG RX CONTRAST REV CODE 255: Performed by: NURSE PRACTITIONER

## 2021-01-08 PROCEDURE — 71260 CT THORAX DX C+: CPT

## 2021-01-08 RX ADMIN — IOHEXOL 80 ML: 350 INJECTION, SOLUTION INTRAVENOUS at 08:55

## 2021-03-25 DIAGNOSIS — E78.5 HYPERLIPIDEMIA LDL GOAL <100: ICD-10-CM

## 2021-03-25 RX ORDER — ATORVASTATIN CALCIUM 10 MG/1
TABLET, FILM COATED ORAL
Qty: 15 TABLET | Refills: 6 | Status: SHIPPED | OUTPATIENT
Start: 2021-03-25 | End: 2021-10-27

## 2021-05-06 ENCOUNTER — HOSPITAL ENCOUNTER (OUTPATIENT)
Dept: LAB | Facility: MEDICAL CENTER | Age: 66
End: 2021-05-06
Attending: INTERNAL MEDICINE
Payer: COMMERCIAL

## 2021-05-06 DIAGNOSIS — M05.79 RHEUMATOID ARTHRITIS INVOLVING MULTIPLE SITES WITH POSITIVE RHEUMATOID FACTOR (HCC): ICD-10-CM

## 2021-05-06 DIAGNOSIS — M35.00 SJOGREN'S SYNDROME, WITH UNSPECIFIED ORGAN INVOLVEMENT (HCC): ICD-10-CM

## 2021-05-06 DIAGNOSIS — Z79.899 LONG-TERM USE OF PLAQUENIL: ICD-10-CM

## 2021-05-06 LAB
ALBUMIN SERPL BCP-MCNC: 3.8 G/DL (ref 3.2–4.9)
ALBUMIN/GLOB SERPL: 1.3 G/DL
ALP SERPL-CCNC: 68 U/L (ref 30–99)
ALT SERPL-CCNC: 21 U/L (ref 2–50)
ANION GAP SERPL CALC-SCNC: 11 MMOL/L (ref 7–16)
APPEARANCE UR: CLEAR
AST SERPL-CCNC: 24 U/L (ref 12–45)
BASOPHILS # BLD AUTO: 0.6 % (ref 0–1.8)
BASOPHILS # BLD: 0.05 K/UL (ref 0–0.12)
BILIRUB SERPL-MCNC: 0.7 MG/DL (ref 0.1–1.5)
BILIRUB UR QL STRIP.AUTO: NEGATIVE
BUN SERPL-MCNC: 12 MG/DL (ref 8–22)
C3 SERPL-MCNC: 135 MG/DL (ref 87–200)
C4 SERPL-MCNC: 25.2 MG/DL (ref 19–52)
CALCIUM SERPL-MCNC: 9.3 MG/DL (ref 8.4–10.2)
CHLORIDE SERPL-SCNC: 104 MMOL/L (ref 96–112)
CO2 SERPL-SCNC: 24 MMOL/L (ref 20–33)
COLOR UR: YELLOW
CREAT SERPL-MCNC: 0.63 MG/DL (ref 0.5–1.4)
CRP SERPL HS-MCNC: 0.69 MG/DL (ref 0–0.75)
EOSINOPHIL # BLD AUTO: 0.28 K/UL (ref 0–0.51)
EOSINOPHIL NFR BLD: 3.1 % (ref 0–6.9)
ERYTHROCYTE [DISTWIDTH] IN BLOOD BY AUTOMATED COUNT: 47 FL (ref 35.9–50)
ERYTHROCYTE [SEDIMENTATION RATE] IN BLOOD BY WESTERGREN METHOD: 10 MM/HOUR (ref 0–30)
GLOBULIN SER CALC-MCNC: 2.9 G/DL (ref 1.9–3.5)
GLUCOSE SERPL-MCNC: 82 MG/DL (ref 65–99)
GLUCOSE UR STRIP.AUTO-MCNC: NEGATIVE MG/DL
HCT VFR BLD AUTO: 44.1 % (ref 37–47)
HGB BLD-MCNC: 14.6 G/DL (ref 12–16)
IMM GRANULOCYTES # BLD AUTO: 0.01 K/UL (ref 0–0.11)
IMM GRANULOCYTES NFR BLD AUTO: 0.1 % (ref 0–0.9)
KETONES UR STRIP.AUTO-MCNC: NEGATIVE MG/DL
LEUKOCYTE ESTERASE UR QL STRIP.AUTO: NEGATIVE
LYMPHOCYTES # BLD AUTO: 2.11 K/UL (ref 1–4.8)
LYMPHOCYTES NFR BLD: 23.5 % (ref 22–41)
MCH RBC QN AUTO: 30.3 PG (ref 27–33)
MCHC RBC AUTO-ENTMCNC: 33.1 G/DL (ref 33.6–35)
MCV RBC AUTO: 91.5 FL (ref 81.4–97.8)
MICRO URNS: NORMAL
MONOCYTES # BLD AUTO: 0.94 K/UL (ref 0–0.85)
MONOCYTES NFR BLD AUTO: 10.5 % (ref 0–13.4)
NEUTROPHILS # BLD AUTO: 5.6 K/UL (ref 2–7.15)
NEUTROPHILS NFR BLD: 62.2 % (ref 44–72)
NITRITE UR QL STRIP.AUTO: NEGATIVE
NRBC # BLD AUTO: 0 K/UL
NRBC BLD-RTO: 0 /100 WBC
PH UR STRIP.AUTO: 6 [PH] (ref 5–8)
PLATELET # BLD AUTO: 254 K/UL (ref 164–446)
PMV BLD AUTO: 9.7 FL (ref 9–12.9)
POTASSIUM SERPL-SCNC: 4.2 MMOL/L (ref 3.6–5.5)
PROT SERPL-MCNC: 6.7 G/DL (ref 6–8.2)
PROT UR QL STRIP: NEGATIVE MG/DL
RBC # BLD AUTO: 4.82 M/UL (ref 4.2–5.4)
RBC UR QL AUTO: NEGATIVE
SODIUM SERPL-SCNC: 139 MMOL/L (ref 135–145)
SP GR UR STRIP.AUTO: >=1.03
WBC # BLD AUTO: 9 K/UL (ref 4.8–10.8)

## 2021-05-06 PROCEDURE — 85025 COMPLETE CBC W/AUTO DIFF WBC: CPT

## 2021-05-06 PROCEDURE — 86160 COMPLEMENT ANTIGEN: CPT | Mod: 91

## 2021-05-06 PROCEDURE — 85652 RBC SED RATE AUTOMATED: CPT

## 2021-05-06 PROCEDURE — 81003 URINALYSIS AUTO W/O SCOPE: CPT

## 2021-05-06 PROCEDURE — 36415 COLL VENOUS BLD VENIPUNCTURE: CPT

## 2021-05-06 PROCEDURE — 80053 COMPREHEN METABOLIC PANEL: CPT

## 2021-05-06 PROCEDURE — 86140 C-REACTIVE PROTEIN: CPT

## 2021-06-08 DIAGNOSIS — B02.8 HERPES ZOSTER WITH COMPLICATION: ICD-10-CM

## 2021-06-08 RX ORDER — ACYCLOVIR 400 MG/1
TABLET ORAL
Qty: 180 TABLET | Refills: 0 | Status: SHIPPED | OUTPATIENT
Start: 2021-06-08 | End: 2021-09-09 | Stop reason: SDUPTHER

## 2021-06-15 ENCOUNTER — APPOINTMENT (RX ONLY)
Dept: URBAN - METROPOLITAN AREA CLINIC 35 | Facility: CLINIC | Age: 66
Setting detail: DERMATOLOGY
End: 2021-06-15

## 2021-06-15 DIAGNOSIS — L82.1 OTHER SEBORRHEIC KERATOSIS: ICD-10-CM

## 2021-06-15 DIAGNOSIS — M71 OTHER BURSOPATHIES: ICD-10-CM

## 2021-06-15 DIAGNOSIS — Z71.89 OTHER SPECIFIED COUNSELING: ICD-10-CM

## 2021-06-15 DIAGNOSIS — L81.4 OTHER MELANIN HYPERPIGMENTATION: ICD-10-CM

## 2021-06-15 DIAGNOSIS — D22 MELANOCYTIC NEVI: ICD-10-CM

## 2021-06-15 PROBLEM — D22.5 MELANOCYTIC NEVI OF TRUNK: Status: ACTIVE | Noted: 2021-06-15

## 2021-06-15 PROBLEM — M71.341 OTHER BURSAL CYST, RIGHT HAND: Status: ACTIVE | Noted: 2021-06-15

## 2021-06-15 PROCEDURE — ? COUNSELING

## 2021-06-15 PROCEDURE — 99213 OFFICE O/P EST LOW 20 MIN: CPT

## 2021-06-15 ASSESSMENT — LOCATION DETAILED DESCRIPTION DERM
LOCATION DETAILED: RIGHT INFERIOR MEDIAL UPPER BACK
LOCATION DETAILED: RIGHT SUPERIOR MEDIAL MIDBACK
LOCATION DETAILED: RIGHT MEDIAL UPPER BACK
LOCATION DETAILED: RIGHT MIDDLE FINGER DISTAL INTERPHALANGEAL JOINT
LOCATION DETAILED: SUPERIOR THORACIC SPINE

## 2021-06-15 ASSESSMENT — LOCATION ZONE DERM
LOCATION ZONE: TRUNK
LOCATION ZONE: FINGER

## 2021-06-15 ASSESSMENT — LOCATION SIMPLE DESCRIPTION DERM
LOCATION SIMPLE: RIGHT MIDDLE FINGER
LOCATION SIMPLE: UPPER BACK
LOCATION SIMPLE: RIGHT UPPER BACK
LOCATION SIMPLE: RIGHT LOWER BACK

## 2021-06-16 ENCOUNTER — HOSPITAL ENCOUNTER (OUTPATIENT)
Dept: RADIOLOGY | Facility: MEDICAL CENTER | Age: 66
End: 2021-06-16
Attending: NURSE PRACTITIONER
Payer: MEDICARE

## 2021-06-16 DIAGNOSIS — Z12.31 ENCOUNTER FOR MAMMOGRAM TO ESTABLISH BASELINE MAMMOGRAM: ICD-10-CM

## 2021-06-16 PROCEDURE — 77063 BREAST TOMOSYNTHESIS BI: CPT

## 2021-08-03 ENCOUNTER — TELEPHONE (OUTPATIENT)
Dept: MEDICAL GROUP | Facility: MEDICAL CENTER | Age: 66
End: 2021-08-03

## 2021-08-03 DIAGNOSIS — E78.5 HYPERLIPIDEMIA LDL GOAL <100: ICD-10-CM

## 2021-08-03 DIAGNOSIS — I10 ESSENTIAL HYPERTENSION: ICD-10-CM

## 2021-08-03 DIAGNOSIS — R73.01 IMPAIRED FASTING GLUCOSE: ICD-10-CM

## 2021-08-03 DIAGNOSIS — E55.9 VITAMIN D DEFICIENCY: ICD-10-CM

## 2021-08-03 DIAGNOSIS — E03.9 ACQUIRED HYPOTHYROIDISM: ICD-10-CM

## 2021-08-03 DIAGNOSIS — E78.5 DYSLIPIDEMIA: ICD-10-CM

## 2021-08-03 RX ORDER — LEVOTHYROXINE SODIUM 0.12 MG/1
125 TABLET ORAL
Qty: 90 TABLET | Refills: 0 | Status: SHIPPED
Start: 2021-08-03 | End: 2021-09-09

## 2021-09-02 DIAGNOSIS — I10 ESSENTIAL HYPERTENSION: Chronic | ICD-10-CM

## 2021-09-02 RX ORDER — LISINOPRIL 10 MG/1
10 TABLET ORAL 2 TIMES DAILY
Qty: 60 TABLET | Refills: 0 | Status: SHIPPED | OUTPATIENT
Start: 2021-09-02 | End: 2021-09-09 | Stop reason: SDUPTHER

## 2021-09-03 ENCOUNTER — HOSPITAL ENCOUNTER (OUTPATIENT)
Dept: LAB | Facility: MEDICAL CENTER | Age: 66
End: 2021-09-03
Attending: NURSE PRACTITIONER
Payer: MEDICARE

## 2021-09-03 DIAGNOSIS — E78.5 DYSLIPIDEMIA: ICD-10-CM

## 2021-09-03 DIAGNOSIS — R73.01 IMPAIRED FASTING GLUCOSE: ICD-10-CM

## 2021-09-03 DIAGNOSIS — I10 ESSENTIAL HYPERTENSION: ICD-10-CM

## 2021-09-03 DIAGNOSIS — E03.9 ACQUIRED HYPOTHYROIDISM: ICD-10-CM

## 2021-09-03 DIAGNOSIS — E78.5 HYPERLIPIDEMIA LDL GOAL <100: ICD-10-CM

## 2021-09-03 DIAGNOSIS — E55.9 VITAMIN D DEFICIENCY: ICD-10-CM

## 2021-09-03 LAB
25(OH)D3 SERPL-MCNC: 31 NG/ML (ref 30–100)
ALBUMIN SERPL BCP-MCNC: 3.9 G/DL (ref 3.2–4.9)
ALBUMIN/GLOB SERPL: 1.3 G/DL
ALP SERPL-CCNC: 62 U/L (ref 30–99)
ALT SERPL-CCNC: 25 U/L (ref 2–50)
ANION GAP SERPL CALC-SCNC: 12 MMOL/L (ref 7–16)
AST SERPL-CCNC: 22 U/L (ref 12–45)
BILIRUB SERPL-MCNC: 0.6 MG/DL (ref 0.1–1.5)
BUN SERPL-MCNC: 18 MG/DL (ref 8–22)
CALCIUM SERPL-MCNC: 9.4 MG/DL (ref 8.5–10.5)
CHLORIDE SERPL-SCNC: 101 MMOL/L (ref 96–112)
CHOLEST SERPL-MCNC: 191 MG/DL (ref 100–199)
CO2 SERPL-SCNC: 25 MMOL/L (ref 20–33)
CREAT SERPL-MCNC: 0.75 MG/DL (ref 0.5–1.4)
CREAT UR-MCNC: 88.62 MG/DL
FASTING STATUS PATIENT QL REPORTED: NORMAL
GLOBULIN SER CALC-MCNC: 2.9 G/DL (ref 1.9–3.5)
GLUCOSE SERPL-MCNC: 96 MG/DL (ref 65–99)
HDLC SERPL-MCNC: 62 MG/DL
LDLC SERPL CALC-MCNC: 114 MG/DL
MICROALBUMIN UR-MCNC: <1.2 MG/DL
MICROALBUMIN/CREAT UR: NORMAL MG/G (ref 0–30)
POTASSIUM SERPL-SCNC: 4.4 MMOL/L (ref 3.6–5.5)
PROT SERPL-MCNC: 6.8 G/DL (ref 6–8.2)
SODIUM SERPL-SCNC: 138 MMOL/L (ref 135–145)
T4 FREE SERPL-MCNC: 1.27 NG/DL (ref 0.93–1.7)
TRIGL SERPL-MCNC: 74 MG/DL (ref 0–149)
TSH SERPL DL<=0.005 MIU/L-ACNC: 8.94 UIU/ML (ref 0.38–5.33)

## 2021-09-03 PROCEDURE — 82306 VITAMIN D 25 HYDROXY: CPT

## 2021-09-03 PROCEDURE — 82043 UR ALBUMIN QUANTITATIVE: CPT

## 2021-09-03 PROCEDURE — 84443 ASSAY THYROID STIM HORMONE: CPT

## 2021-09-03 PROCEDURE — 80061 LIPID PANEL: CPT

## 2021-09-03 PROCEDURE — 82570 ASSAY OF URINE CREATININE: CPT

## 2021-09-03 PROCEDURE — 84439 ASSAY OF FREE THYROXINE: CPT

## 2021-09-03 PROCEDURE — 36415 COLL VENOUS BLD VENIPUNCTURE: CPT

## 2021-09-03 PROCEDURE — 80053 COMPREHEN METABOLIC PANEL: CPT

## 2021-09-07 ENCOUNTER — TELEPHONE (OUTPATIENT)
Dept: MEDICAL GROUP | Facility: MEDICAL CENTER | Age: 66
End: 2021-09-07

## 2021-09-08 SDOH — ECONOMIC STABILITY: TRANSPORTATION INSECURITY
IN THE PAST 12 MONTHS, HAS LACK OF TRANSPORTATION KEPT YOU FROM MEETINGS, WORK, OR FROM GETTING THINGS NEEDED FOR DAILY LIVING?: NO

## 2021-09-08 SDOH — ECONOMIC STABILITY: FOOD INSECURITY: WITHIN THE PAST 12 MONTHS, THE FOOD YOU BOUGHT JUST DIDN'T LAST AND YOU DIDN'T HAVE MONEY TO GET MORE.: NEVER TRUE

## 2021-09-08 SDOH — HEALTH STABILITY: PHYSICAL HEALTH: ON AVERAGE, HOW MANY MINUTES DO YOU ENGAGE IN EXERCISE AT THIS LEVEL?: 20 MIN

## 2021-09-08 SDOH — ECONOMIC STABILITY: HOUSING INSECURITY: IN THE LAST 12 MONTHS, HOW MANY PLACES HAVE YOU LIVED?: 1

## 2021-09-08 SDOH — ECONOMIC STABILITY: TRANSPORTATION INSECURITY
IN THE PAST 12 MONTHS, HAS THE LACK OF TRANSPORTATION KEPT YOU FROM MEDICAL APPOINTMENTS OR FROM GETTING MEDICATIONS?: NO

## 2021-09-08 SDOH — ECONOMIC STABILITY: FOOD INSECURITY: WITHIN THE PAST 12 MONTHS, YOU WORRIED THAT YOUR FOOD WOULD RUN OUT BEFORE YOU GOT MONEY TO BUY MORE.: NEVER TRUE

## 2021-09-08 SDOH — ECONOMIC STABILITY: INCOME INSECURITY: HOW HARD IS IT FOR YOU TO PAY FOR THE VERY BASICS LIKE FOOD, HOUSING, MEDICAL CARE, AND HEATING?: NOT HARD AT ALL

## 2021-09-08 SDOH — ECONOMIC STABILITY: HOUSING INSECURITY
IN THE LAST 12 MONTHS, WAS THERE A TIME WHEN YOU DID NOT HAVE A STEADY PLACE TO SLEEP OR SLEPT IN A SHELTER (INCLUDING NOW)?: NO

## 2021-09-08 SDOH — HEALTH STABILITY: MENTAL HEALTH
STRESS IS WHEN SOMEONE FEELS TENSE, NERVOUS, ANXIOUS, OR CAN'T SLEEP AT NIGHT BECAUSE THEIR MIND IS TROUBLED. HOW STRESSED ARE YOU?: NOT AT ALL

## 2021-09-08 SDOH — ECONOMIC STABILITY: TRANSPORTATION INSECURITY
IN THE PAST 12 MONTHS, HAS LACK OF RELIABLE TRANSPORTATION KEPT YOU FROM MEDICAL APPOINTMENTS, MEETINGS, WORK OR FROM GETTING THINGS NEEDED FOR DAILY LIVING?: NO

## 2021-09-08 SDOH — ECONOMIC STABILITY: INCOME INSECURITY: IN THE LAST 12 MONTHS, WAS THERE A TIME WHEN YOU WERE NOT ABLE TO PAY THE MORTGAGE OR RENT ON TIME?: NO

## 2021-09-08 SDOH — HEALTH STABILITY: PHYSICAL HEALTH: ON AVERAGE, HOW MANY DAYS PER WEEK DO YOU ENGAGE IN MODERATE TO STRENUOUS EXERCISE (LIKE A BRISK WALK)?: 2 DAYS

## 2021-09-08 ASSESSMENT — SOCIAL DETERMINANTS OF HEALTH (SDOH)
HOW OFTEN DO YOU HAVE SIX OR MORE DRINKS ON ONE OCCASION: NEVER
HOW OFTEN DO YOU GET TOGETHER WITH FRIENDS OR RELATIVES?: TWICE A WEEK
IN A TYPICAL WEEK, HOW MANY TIMES DO YOU TALK ON THE PHONE WITH FAMILY, FRIENDS, OR NEIGHBORS?: MORE THAN THREE TIMES A WEEK
HOW OFTEN DO YOU GET TOGETHER WITH FRIENDS OR RELATIVES?: TWICE A WEEK
HOW MANY DRINKS CONTAINING ALCOHOL DO YOU HAVE ON A TYPICAL DAY WHEN YOU ARE DRINKING: 1 OR 2
HOW OFTEN DO YOU ATTEND CHURCH OR RELIGIOUS SERVICES?: NEVER
IN A TYPICAL WEEK, HOW MANY TIMES DO YOU TALK ON THE PHONE WITH FAMILY, FRIENDS, OR NEIGHBORS?: MORE THAN THREE TIMES A WEEK
HOW OFTEN DO YOU HAVE A DRINK CONTAINING ALCOHOL: 4 OR MORE TIMES A WEEK
DO YOU BELONG TO ANY CLUBS OR ORGANIZATIONS SUCH AS CHURCH GROUPS UNIONS, FRATERNAL OR ATHLETIC GROUPS, OR SCHOOL GROUPS?: NO
WITHIN THE PAST 12 MONTHS, YOU WORRIED THAT YOUR FOOD WOULD RUN OUT BEFORE YOU GOT THE MONEY TO BUY MORE: NEVER TRUE
HOW OFTEN DO YOU ATTENT MEETINGS OF THE CLUB OR ORGANIZATION YOU BELONG TO?: NEVER
HOW OFTEN DO YOU ATTENT MEETINGS OF THE CLUB OR ORGANIZATION YOU BELONG TO?: NEVER
DO YOU BELONG TO ANY CLUBS OR ORGANIZATIONS SUCH AS CHURCH GROUPS UNIONS, FRATERNAL OR ATHLETIC GROUPS, OR SCHOOL GROUPS?: NO
HOW HARD IS IT FOR YOU TO PAY FOR THE VERY BASICS LIKE FOOD, HOUSING, MEDICAL CARE, AND HEATING?: NOT HARD AT ALL
HOW OFTEN DO YOU ATTEND CHURCH OR RELIGIOUS SERVICES?: NEVER

## 2021-09-08 ASSESSMENT — LIFESTYLE VARIABLES
HOW OFTEN DO YOU HAVE A DRINK CONTAINING ALCOHOL: 4 OR MORE TIMES A WEEK
HOW MANY STANDARD DRINKS CONTAINING ALCOHOL DO YOU HAVE ON A TYPICAL DAY: 1 OR 2
HOW OFTEN DO YOU HAVE SIX OR MORE DRINKS ON ONE OCCASION: NEVER

## 2021-09-09 ENCOUNTER — OFFICE VISIT (OUTPATIENT)
Dept: MEDICAL GROUP | Facility: MEDICAL CENTER | Age: 66
End: 2021-09-09
Payer: MEDICARE

## 2021-09-09 VITALS
WEIGHT: 223.6 LBS | SYSTOLIC BLOOD PRESSURE: 124 MMHG | HEART RATE: 59 BPM | HEIGHT: 65 IN | BODY MASS INDEX: 37.25 KG/M2 | DIASTOLIC BLOOD PRESSURE: 72 MMHG | TEMPERATURE: 97.6 F | OXYGEN SATURATION: 95 %

## 2021-09-09 DIAGNOSIS — B02.8 HERPES ZOSTER WITH COMPLICATION: ICD-10-CM

## 2021-09-09 DIAGNOSIS — Z23 NEED FOR PNEUMOCOCCAL VACCINATION: ICD-10-CM

## 2021-09-09 DIAGNOSIS — E78.5 HYPERLIPIDEMIA LDL GOAL <100: ICD-10-CM

## 2021-09-09 DIAGNOSIS — I10 ESSENTIAL HYPERTENSION: Chronic | ICD-10-CM

## 2021-09-09 DIAGNOSIS — Z87.19 HISTORY OF DIVERTICULITIS OF COLON: Chronic | ICD-10-CM

## 2021-09-09 DIAGNOSIS — E55.9 VITAMIN D DEFICIENCY: ICD-10-CM

## 2021-09-09 DIAGNOSIS — R91.1 PULMONARY NODULE: Chronic | ICD-10-CM

## 2021-09-09 DIAGNOSIS — B37.9 CANDIDIASIS: ICD-10-CM

## 2021-09-09 DIAGNOSIS — E03.9 ACQUIRED HYPOTHYROIDISM: ICD-10-CM

## 2021-09-09 DIAGNOSIS — R25.2 LEG CRAMPS: ICD-10-CM

## 2021-09-09 DIAGNOSIS — R94.6 ABNORMAL THYROID FUNCTION TEST: ICD-10-CM

## 2021-09-09 DIAGNOSIS — L30.8 PRURITIC DERMATITIS: Chronic | ICD-10-CM

## 2021-09-09 DIAGNOSIS — E66.9 OBESITY (BMI 35.0-39.9 WITHOUT COMORBIDITY): ICD-10-CM

## 2021-09-09 PROCEDURE — 90670 PCV13 VACCINE IM: CPT | Performed by: NURSE PRACTITIONER

## 2021-09-09 PROCEDURE — G0009 ADMIN PNEUMOCOCCAL VACCINE: HCPCS | Performed by: NURSE PRACTITIONER

## 2021-09-09 PROCEDURE — G0402 INITIAL PREVENTIVE EXAM: HCPCS | Performed by: NURSE PRACTITIONER

## 2021-09-09 RX ORDER — ROSUVASTATIN CALCIUM 5 MG/1
5 TABLET, COATED ORAL
Qty: 15 TABLET | Refills: 2 | Status: SHIPPED | OUTPATIENT
Start: 2021-09-09 | End: 2021-12-03 | Stop reason: SDUPTHER

## 2021-09-09 RX ORDER — LISINOPRIL 10 MG/1
10 TABLET ORAL 2 TIMES DAILY
Qty: 60 TABLET | Refills: 1 | Status: SHIPPED | OUTPATIENT
Start: 2021-09-09 | End: 2021-12-03 | Stop reason: SDUPTHER

## 2021-09-09 RX ORDER — KETOCONAZOLE 20 MG/G
1 CREAM TOPICAL 2 TIMES DAILY
Qty: 30 G | Refills: 1 | Status: SHIPPED | OUTPATIENT
Start: 2021-09-09 | End: 2022-05-03 | Stop reason: SDUPTHER

## 2021-09-09 RX ORDER — ACYCLOVIR 400 MG/1
TABLET ORAL
Qty: 180 TABLET | Refills: 1 | Status: SHIPPED | OUTPATIENT
Start: 2021-09-09 | End: 2021-11-23 | Stop reason: SDUPTHER

## 2021-09-09 RX ORDER — LEVOTHYROXINE SODIUM 137 UG/1
137 TABLET ORAL
Qty: 90 TABLET | Refills: 0 | Status: SHIPPED | OUTPATIENT
Start: 2021-09-09 | End: 2021-12-03

## 2021-09-09 ASSESSMENT — ENCOUNTER SYMPTOMS: GENERAL WELL-BEING: FAIR

## 2021-09-09 ASSESSMENT — PATIENT HEALTH QUESTIONNAIRE - PHQ9: CLINICAL INTERPRETATION OF PHQ2 SCORE: 0

## 2021-09-09 ASSESSMENT — FIBROSIS 4 INDEX: FIB4 SCORE: 1.13

## 2021-09-09 ASSESSMENT — ACTIVITIES OF DAILY LIVING (ADL): BATHING_REQUIRES_ASSISTANCE: 0

## 2021-09-09 NOTE — ASSESSMENT & PLAN NOTE
Stable over last 11 yrs.  jpt has found out that RA can cause the nodules.  Last CT chest done this year showed stability

## 2021-09-09 NOTE — PROGRESS NOTES
Subjective:     Elizabeth Nuñez is a 66 y.o. female who presents with ***.    HPI:       Patient Active Problem List    Diagnosis Date Noted   • Obesity (BMI 35.0-39.9 without comorbidity) ([facility]) 06/09/2020   • IFG (impaired fasting glucose) 01/05/2019   • Obesity (BMI 30-39.9) 07/13/2017   • Allergy to pollen    • Hypertension 02/22/2012   • Syncope 05/20/2010   • Hypothyroid 04/22/2009   • Pulmonary nodule 04/22/2009   • Dyslipidemia 04/22/2009   • Pruritic dermatitis 04/22/2009   • History of diverticulitis of colon 04/22/2009   • Carpal tunnel syndrome 04/22/2009   • Adenomatous polyp of colon 04/22/2009   • GERD (gastroesophageal reflux disease) 04/22/2009       Current medicines (including changes today)  Current Outpatient Medications   Medication Sig Dispense Refill   • lisinopril (PRINIVIL) 10 MG Tab Take 1 Tablet by mouth 2 times a day. TAKE 1 TABLET BY MOUTH TWICE A DAY 60 Tablet 0   • levothyroxine (SYNTHROID) 125 MCG Tab Take 1 tablet by mouth every day. 90 tablet 0   • acyclovir (ZOVIRAX) 400 MG tablet TAKE 1 TABLET BY MOUTH TWICE A  tablet 0   • hydroxychloroquine (PLAQUENIL) 200 MG Tab Take 1 tablet by mouth 2 times a day. 180 tablet 1   • atorvastatin (LIPITOR) 10 MG Tab TAKE 1 TABLET BY MOUTH EVERY 48 HOURS 15 tablet 6   • albuterol 108 (90 Base) MCG/ACT Aero Soln inhalation aerosol Inhale 2 Puffs by mouth every 6 hours as needed for Shortness of Breath. 8.5 g 1   • Calcium Carb-Cholecalciferol (CALCIUM 500 +D PO) Take 3 Tabs by mouth every evening.     • B Complex Cap Take 1 Cap by mouth every day. 30 Cap 0   • OMEPRAZOLE 20 MG PO CPDR Take 20 mg by mouth as needed (For heartburn).  11     No current facility-administered medications for this visit.       Allergies   Allergen Reactions   • Morphine Hives and Itching   • Sulfa Drugs Hives and Itching   • Quinine      Hives       ROS  Constitutional: Negative. Negative for fever, chills, weight loss, malaise/fatigue and diaphoresis.  "  HENT: Negative. Negative for hearing loss, ear pain, nosebleeds, congestion, sore throat, neck pain, tinnitus and ear discharge.   Respiratory: Negative. Negative for cough, hemoptysis, sputum production, shortness of breath, wheezing and stridor.   Cardiovascular: Negative. Negative for chest pain, palpitations, orthopnea, claudication, leg swelling and PND.   Gastrointestinal: Denies nausea, vomiting, diarrhea, constipation, heartburn, melena or hematochezia.  Genitourinary: Denies dysuria, hematuria, urinary incontinence, frequency or urgency.        Objective:     /72 (BP Location: Right arm, Patient Position: Sitting)   Pulse (!) 59   Temp 36.4 °C (97.6 °F) (Temporal)   Ht 1.651 m (5' 5\")   Wt 101 kg (223 lb 9.6 oz)   SpO2 95%  Body mass index is 37.21 kg/m².    Physical Exam:  Vitals reviewed.  Constitutional: Oriented to person, place, and time. appears well-developed and well-nourished. No distress.   Cardiovascular: Normal rate, regular rhythm, normal heart sounds and intact distal pulses. Exam reveals no gallop and no friction rub. No murmur heard. No carotid bruits.   Pulmonary/Chest: Effort normal and breath sounds normal. No stridor. No respiratory distress. no wheezes or rales. exhibits no tenderness.   Musculoskeletal: Normal range of motion. exhibits no edema. norma pedal pulses 2+.  Lymphadenopathy: No cervical or supraclavicular adenopathy.   Neurological: Alert and oriented to person, place, and time. exhibits normal muscle tone.  Skin: Skin is warm and dry. No diaphoresis.   Psychiatric: Normal mood and affect. Behavior is normal.      Assessment and Plan:     The following treatment plan was discussed:    1. Essential hypertension  lisinopril (PRINIVIL) 10 MG Tab   2. Herpes zoster with complication  acyclovir (ZOVIRAX) 400 MG tablet    refilled acyclovir for chronic use d/t recent facial shingles.           Followup: No follow-ups on file.  "

## 2021-09-09 NOTE — PROGRESS NOTES
HPI:  Elizabeth is a 66 y.o. here for Medicare Annual Wellness Visit  She is due prevnar 13 now that she is 66.    She has been feeling fatigued  She is on lipitor.  She takes  At nite every other nite.  When she takes it she has body and back pain all nite long.  Wakes her up.    She has been having more leg cramps recently.  She started taking mg++.  It is helping her sx.   Reviewed lab with pt.  Her GFR, alb/cr ratio, CMP, T4 is wnl.  TSH is high at 8.9.  Taking med approp.  She had elevation once before but not this high.   Vitamin d is low normal at 31.  On otc supplement but not enough.   LP shows good trg and HDL. LDL is up from 103 to 114.  Goal is <100.  Taking lipitor everyother day despite s/e.  Will change to crestor  Bp is stable and controlled.  Taking med approp.  Needs refill  She is taking acyclovir daily d/t having freq outbreaks of shingles.  If she tries to stop med she will feel that it is coming back. Needs refill.    PULMONARY NODULE  Stable over last 11 yrs.  jpt has found out that RA can cause the nodules.  Last CT chest done this year showed stability    Pruritic Dermatitis  She has a chronic yeast rash under both breasts.  + itching.  Has been using steroid cream.     History of diverticulitis of colon  No current sx.  She is trying to follow high fiber diet.    Obesity (BMI 35.0-39.9 without comorbidity) ([facility])  She follows a healthy diet.  She is not exercising d/t fatigue from RA and thyroid        Patient Active Problem List    Diagnosis Date Noted   • Obesity (BMI 35.0-39.9 without comorbidity) ([facility]) 06/09/2020   • IFG (impaired fasting glucose) 01/05/2019   • Obesity (BMI 30-39.9) 07/13/2017   • Allergy to pollen    • Hypertension 02/22/2012   • Syncope 05/20/2010   • Hypothyroid 04/22/2009   • Pulmonary nodule 04/22/2009   • Dyslipidemia 04/22/2009   • Pruritic dermatitis 04/22/2009   • History of diverticulitis of colon 04/22/2009   • Carpal tunnel syndrome 04/22/2009   •  Adenomatous polyp of colon 04/22/2009   • GERD (gastroesophageal reflux disease) 04/22/2009       Current Outpatient Medications   Medication Sig Dispense Refill   • lisinopril (PRINIVIL) 10 MG Tab Take 1 Tablet by mouth 2 times a day. TAKE 1 TABLET BY MOUTH TWICE A DAY 60 Tablet 1   • acyclovir (ZOVIRAX) 400 MG tablet TAKE 1 TABLET BY MOUTH TWICE A  Tablet 1   • levothyroxine (SYNTHROID) 137 MCG Tab Take 1 Tablet by mouth every morning on an empty stomach. 90 Tablet 0   • rosuvastatin (CRESTOR) 5 MG Tab Take 1 Tablet by mouth every 48 hours. 15 Tablet 2   • ketoconazole (NIZORAL) 2 % Cream Apply 1 Application topically 2 times a day. 30 g 1   • hydroxychloroquine (PLAQUENIL) 200 MG Tab Take 1 tablet by mouth 2 times a day. 180 tablet 1   • atorvastatin (LIPITOR) 10 MG Tab TAKE 1 TABLET BY MOUTH EVERY 48 HOURS 15 tablet 6   • albuterol 108 (90 Base) MCG/ACT Aero Soln inhalation aerosol Inhale 2 Puffs by mouth every 6 hours as needed for Shortness of Breath. 8.5 g 1   • Calcium Carb-Cholecalciferol (CALCIUM 500 +D PO) Take 3 Tabs by mouth every evening.     • B Complex Cap Take 1 Cap by mouth every day. 30 Cap 0   • OMEPRAZOLE 20 MG PO CPDR Take 20 mg by mouth as needed (For heartburn).  11     No current facility-administered medications for this visit.        Patient is taking medications as noted in medication list.  Current supplements as per medication list.     Allergies: Morphine, Sulfa drugs, and Quinine    Current social contact/activities:  Work,goes on vacation with family.    Is patient current with immunizations? Yes.    She  reports that she has never smoked. She has never used smokeless tobacco. She reports current alcohol use. She reports that she does not use drugs.  Counseling given: Not Answered        DPA/Advanced directive: Patient does not have an Advanced Directive.  A packet and workshop information was given on Advanced Directives.    ROS:    Gait: Uses no assistive device   Ostomy: No    Other tubes: No   Amputations: No   Chronic oxygen use No   Last eye exam  12/2020  Wears hearing aids: No   : Reports urinary leakage during the last 6 months that has not interfered at all with their daily activities or sleep.  Review of Systems   Constitutional: Negative.  Negative for fever, chills, weight loss, malaise/fatigue and diaphoresis.   HENT: Negative.  Negative for hearing loss, ear pain, nosebleeds, congestion, sore throat, neck pain, tinnitus and ear discharge.    Eyes: Negative.  Negative for blurred vision, double vision, photophobia, pain, discharge and redness.   Respiratory: Negative.  Negative for cough, hemoptysis, sputum production, shortness of breath, wheezing and stridor.    Cardiovascular: Negative.  Negative for chest pain, palpitations, orthopnea, claudication, leg swelling and PND.   Gastrointestinal: Negative.  Negative for heartburn, nausea, vomiting, abdominal pain, diarrhea, constipation, blood in stool and melena.   Genitourinary: Negative.  Negative for dysuria, urgency, frequency, incontinence, hematuria and flank pain.   Musculoskeletal: Negative.  Negative for myalgias, back pain, joint pain and falls.   Skin: Negative.  Negative for itching and rash.   Neurological: Negative.  Negative for dizziness, tingling, tremors, sensory change, speech change, focal weakness, seizures, loss of consciousness, weakness and headaches.   Endo/Heme/Allergies: Negative.  Negative for environmental allergies and polydipsia. Does not bruise/bleed easily.   Psychiatric/Behavioral: Negative.  Negative for depression, suicidal ideas, hallucinations, memory loss and substance abuse. The patient is not nervous/anxious and does not have insomnia.    All other systems reviewed and are negative.      Screening:    yes  Due prevnar 13  Depression Screening    Little interest or pleasure in doing things?  0 - not at all  Feeling down, depressed, or hopeless? 0 - not at all  Patient Health  Questionnaire Score: 0    If depressive symptoms identified deferred to follow up visit unless specifically addressed in assessment and plan.    Interpretation of PHQ-9 Total Score   Score Severity   1-4 No Depression   5-9 Mild Depression   10-14 Moderate Depression   15-19 Moderately Severe Depression   20-27 Severe Depression    Screening for Cognitive Impairment    Three Minute Recall (captain, garden, picture)  1/3    [place] clock face with all 12 numbers and set the hands to show 5 past 8.  Yes    If cognitive concerns identified, deferred for follow up unless specifically addressed in assessment and plan.    Fall Risk Assessment    Has the patient had two or more falls in the last year or any fall with injury in the last year?  No  If fall risk identified, deferred for follow up unless specifically addressed in assessment and plan.    Safety Assessment    Throw rugs on floor.  No  Handrails on all stairs.  No  Good lighting in all hallways.  Yes  Difficulty hearing.  No  Patient counseled about all safety risks that were identified.    Functional Assessment ADLs    Are there any barriers preventing you from cooking for yourself or meeting nutritional needs?  No.    Are there any barriers preventing you from driving safely or obtaining transportation?  No.    Are there any barriers preventing you from using a telephone or calling for help?  No.    Are there any barriers preventing you from shopping?  No.    Are there any barriers preventing you from taking care of your own finances?  No.    Are there any barriers preventing you from managing your medications?  No.    Are there any barriers preventing you from showering, bathing or dressing yourself?  No.    Are you currently engaging in any exercise or physical activity?  Yes.     What is your perception of your health?  Fair.    Health Maintenance Summary                IMM INFLUENZA Overdue 9/1/2021      Done 9/8/2020 Imm Admin: Influenza Vaccine Adult HD      Patient has more history with this topic...    MAMMOGRAM Next Due 2022      Done 2021 MA-SCREENING MAMMO BILAT W/TOMOSYNTHESIS W/CAD     Patient has more history with this topic...    IMM PNEUMOCOCCAL VACCINE: 65+ Years Next Due 2022      Done 2021 Imm Admin: Pneumococcal Conjugate Vaccine (Prevnar/PCV-13)    BONE DENSITY Next Due 7/10/2025      Done 7/10/2020 DS-BONE DENSITY STUDY (DEXA)     Patient has more history with this topic...    COLORECTAL CANCER SCREENING Next Due 3/15/2027     IMM DTaP/Tdap/Td Vaccine Next Due 2027      Done 2017 Imm Admin: Tdap Vaccine     Patient has more history with this topic...          Patient Care Team:  BASILIO Carrera as PCP - General (Family Medicine)    Social History     Tobacco Use   • Smoking status: Never Smoker   • Smokeless tobacco: Never Used   Vaping Use   • Vaping Use: Never used   Substance Use Topics   • Alcohol use: Yes     Alcohol/week: 0.0 oz   • Drug use: No     Family History   Problem Relation Age of Onset   • Cancer Mother         ovarian, age 52   • Cancer Maternal Aunt         breast, dx age 33,  age 62   • Breast Cancer Maternal Aunt    • Cancer Maternal Aunt         ovarian cancer   • Cancer Other          cousin, breast cancer age 40   • Cancer Paternal Grandfather         stomach   • Cancer Paternal Uncle         stomach     She  has a past medical history of Adenomatous polyp of colon (2009), Allergic rhinitis, Carpal tunnel syndrome (2009), Diverticulitis (2009), Dyslipidemia (2009), GERD (gastroesophageal reflux disease) (2009), Hypertension, Hypothyroidism, Impaired fasting glucose (2009), Obesity (BMI 30-39.9) (2017), Pruritic dermatitis (2009), Pulmonary nodule (2009), and Syncope.   Past Surgical History:   Procedure Laterality Date   • HYSTERECTOMY ROBOTIC  2009    Performed by ARLEN ABDI at SURGERY [facility].   due to uterine fibroids and ovarian  "cyst   • ARTHROSCOPY, KNEE  2005    left knee repair   • METATARSAL HEAD RESECTION      metatarsal arthroplasty 2005   • PRIMARY C SECTION      x2   • UMBILICAL HERNIA REPAIR             Exam:     /72 (BP Location: Right arm, Patient Position: Sitting)   Pulse (!) 59   Temp 36.4 °C (97.6 °F) (Temporal)   Ht 1.651 m (5' 5\")   Wt 101 kg (223 lb 9.6 oz)   SpO2 95%  Body mass index is 37.21 kg/m².  Hearing fair.    Dentition None  Alert, oriented in no acute distress.  Eye contact is good, speech goal directed, affect calm  Physical Exam   Vitals reviewed.  Constitutional: oriented to person, place, and time. appears well-developed and well-nourished. No distress.   HENT: Head: Normocephalic and atraumatic. Bilateral tympanic membranes wnl w/o bulging.  Right Ear: External ear normal. Left Ear: External ear normal. Nose: Nose normal.  Mouth/Throat: Oropharynx is clear and moist. No oropharyngeal exudate. norma tm wnl. Eyes: Conjunctivae and EOM are normal. Pupils are equal, round, and reactive to light. Right eye exhibits no discharge. Left eye exhibits no discharge. No scleral icterus.    Neck: Normal range of motion. Neck supple. No JVD present.   Cardiovascular: Normal rate, regular rhythm, normal heart sounds and intact distal pulses.  Exam reveals no gallop and no friction rub.  No murmur heard.  No carotid bruits   Pulmonary/Chest: Effort normal and breath sounds normal. No stridor. No respiratory distress. no wheezes or rales. exhibits no tenderness.   Abdominal: Soft. Bowel sounds are normal. exhibits no distension and no mass. No tenderness. no rebound and no guarding.   Musculoskeletal: Normal range of motion. exhibits no edema or tenderness.  norma pedal pulses 2+.  Lymphadenopathy:  no cervical or supraclavicular adenopathy.   Neurological: alert and oriented to person, place, and time. has normal reflexes. displays normal reflexes. No cranial nerve deficit. exhibits normal muscle tone. Coordination " normal.   Skin: Skin is warm and dry. No rash noted. no diaphoresis. No erythema. No pallor.   Psychiatric: normal mood and affect. behavior is normal.     Assessment and Plan. The following treatment and monitoring plan is recommended:    1. Acquired hypothyroidism  TSH    FREE THYROXINE    levothyroxine (SYNTHROID) 137 MCG Tab    THYROID PEROXIDASE  (TPO) AB    having fatigue assoc w/abn tft's.  TSH high.  inc synthroid to 137 mcg daily.  recheck lab 2 mo.  f/u for review.    2. Abnormal thyroid function test  TSH    FREE THYROXINE    levothyroxine (SYNTHROID) 137 MCG Tab    THYROID PEROXIDASE  (TPO) AB    recheck lab 2 mo.  f/u for review.    3. Hyperlipidemia LDL goal <100  rosuvastatin (CRESTOR) 5 MG Tab    Comp Metabolic Panel    Lipid Profile    LP not at goal on lipitor.  dc lipitor d/t leg cramps.  start crestor 5 mg.  diaz lab 2 mo.  f/u for review.     4. Essential hypertension  lisinopril (PRINIVIL) 10 MG Tab    bp stable and controlled on meds.  taking meds approp   5. Herpes zoster with complication  acyclovir (ZOVIRAX) 400 MG tablet    refilled acyclovir for chronic use d/t recurrent shingles sx if she stops the med.     6. Pulmonary nodule      pt feels r/t RA.  stable x 11 yrs per CT chest done last yr.  no further w/u needed   7. Pruritic dermatitis      having candidiasis rash under breasts.  use ketoconazole crm   8. History of diverticulitis of colon      continues healthy diet w/high fiber and no foods with seeds.  no current sx.    9. Candidiasis  ketoconazole (NIZORAL) 2 % Cream    use ketoconazole crm   10. Vitamin D deficiency      vitamin d low.  inc otc supplement to 2000 units daily   11. Leg cramps      improved with otc mg++   12. Obesity (BMI 35.0-39.9 without comorbidity) (HCC)      enc pt to follow healthy diet and do regular exercise.     13. Need for pneumococcal vaccination  Pneumococcal Conjugate Vaccine 13-Valent    prevnar 13         Services suggested: No services needed at  this time  Health Care Screening recommendations as per orders if indicated.  Referrals offered: PT/OT/Nutrition counseling/Behavioral Health/Smoking cessation as per orders if indicated.    Discussion today about general wellness and lifestyle habits:    · Prevent falls and reduce trip hazards; Cautioned about securing or removing rugs.  · Have a working fire alarm and carbon monoxide detector;   · Engage in regular physical activity and social activities       Follow-up: Return in about 2 months (around 11/9/2021).

## 2021-10-20 ENCOUNTER — HOSPITAL ENCOUNTER (OUTPATIENT)
Dept: LAB | Facility: MEDICAL CENTER | Age: 66
End: 2021-10-20
Attending: NURSE PRACTITIONER
Payer: MEDICARE

## 2021-10-20 DIAGNOSIS — M05.79 RHEUMATOID ARTHRITIS INVOLVING MULTIPLE SITES WITH POSITIVE RHEUMATOID FACTOR (HCC): ICD-10-CM

## 2021-10-20 DIAGNOSIS — R94.6 ABNORMAL THYROID FUNCTION TEST: ICD-10-CM

## 2021-10-20 DIAGNOSIS — M35.00 SJOGREN'S SYNDROME, WITH UNSPECIFIED ORGAN INVOLVEMENT (HCC): ICD-10-CM

## 2021-10-20 DIAGNOSIS — E78.5 HYPERLIPIDEMIA LDL GOAL <100: ICD-10-CM

## 2021-10-20 DIAGNOSIS — Z79.899 HIGH RISK MEDICATION USE: ICD-10-CM

## 2021-10-20 DIAGNOSIS — Z79.899 LONG-TERM USE OF PLAQUENIL: ICD-10-CM

## 2021-10-20 DIAGNOSIS — E03.9 ACQUIRED HYPOTHYROIDISM: ICD-10-CM

## 2021-10-20 LAB
ALBUMIN SERPL BCP-MCNC: 4.2 G/DL (ref 3.2–4.9)
ALBUMIN SERPL BCP-MCNC: 4.3 G/DL (ref 3.2–4.9)
ALBUMIN/GLOB SERPL: 1.5 G/DL
ALBUMIN/GLOB SERPL: 1.6 G/DL
ALP SERPL-CCNC: 71 U/L (ref 30–99)
ALP SERPL-CCNC: 72 U/L (ref 30–99)
ALT SERPL-CCNC: 23 U/L (ref 2–50)
ALT SERPL-CCNC: 23 U/L (ref 2–50)
ANION GAP SERPL CALC-SCNC: 11 MMOL/L (ref 7–16)
ANION GAP SERPL CALC-SCNC: 12 MMOL/L (ref 7–16)
APPEARANCE UR: CLEAR
AST SERPL-CCNC: 20 U/L (ref 12–45)
AST SERPL-CCNC: 22 U/L (ref 12–45)
BASOPHILS # BLD AUTO: 0.9 % (ref 0–1.8)
BASOPHILS # BLD: 0.05 K/UL (ref 0–0.12)
BILIRUB SERPL-MCNC: 0.7 MG/DL (ref 0.1–1.5)
BILIRUB SERPL-MCNC: 0.7 MG/DL (ref 0.1–1.5)
BILIRUB UR QL STRIP.AUTO: NEGATIVE
BUN SERPL-MCNC: 10 MG/DL (ref 8–22)
BUN SERPL-MCNC: 10 MG/DL (ref 8–22)
C3 SERPL-MCNC: 134.5 MG/DL (ref 87–200)
C4 SERPL-MCNC: 23.7 MG/DL (ref 19–52)
CALCIUM SERPL-MCNC: 9.2 MG/DL (ref 8.4–10.2)
CALCIUM SERPL-MCNC: 9.4 MG/DL (ref 8.4–10.2)
CHLORIDE SERPL-SCNC: 100 MMOL/L (ref 96–112)
CHLORIDE SERPL-SCNC: 101 MMOL/L (ref 96–112)
CHOLEST SERPL-MCNC: 174 MG/DL (ref 100–199)
CO2 SERPL-SCNC: 25 MMOL/L (ref 20–33)
CO2 SERPL-SCNC: 26 MMOL/L (ref 20–33)
COLOR UR: YELLOW
CREAT SERPL-MCNC: 0.68 MG/DL (ref 0.5–1.4)
CREAT SERPL-MCNC: 0.71 MG/DL (ref 0.5–1.4)
CRP SERPL HS-MCNC: 0.76 MG/DL (ref 0–0.75)
EOSINOPHIL # BLD AUTO: 0.15 K/UL (ref 0–0.51)
EOSINOPHIL NFR BLD: 2.7 % (ref 0–6.9)
ERYTHROCYTE [DISTWIDTH] IN BLOOD BY AUTOMATED COUNT: 46.2 FL (ref 35.9–50)
ERYTHROCYTE [SEDIMENTATION RATE] IN BLOOD BY WESTERGREN METHOD: 16 MM/HOUR (ref 0–25)
FASTING STATUS PATIENT QL REPORTED: NORMAL
GLOBULIN SER CALC-MCNC: 2.7 G/DL (ref 1.9–3.5)
GLOBULIN SER CALC-MCNC: 2.8 G/DL (ref 1.9–3.5)
GLUCOSE SERPL-MCNC: 90 MG/DL (ref 65–99)
GLUCOSE SERPL-MCNC: 96 MG/DL (ref 65–99)
GLUCOSE UR STRIP.AUTO-MCNC: NEGATIVE MG/DL
HCT VFR BLD AUTO: 45 % (ref 37–47)
HDLC SERPL-MCNC: 58 MG/DL
HGB BLD-MCNC: 14.9 G/DL (ref 12–16)
IMM GRANULOCYTES # BLD AUTO: 0.01 K/UL (ref 0–0.11)
IMM GRANULOCYTES NFR BLD AUTO: 0.2 % (ref 0–0.9)
KETONES UR STRIP.AUTO-MCNC: NEGATIVE MG/DL
LDLC SERPL CALC-MCNC: 96 MG/DL
LEUKOCYTE ESTERASE UR QL STRIP.AUTO: NEGATIVE
LYMPHOCYTES # BLD AUTO: 1.46 K/UL (ref 1–4.8)
LYMPHOCYTES NFR BLD: 26 % (ref 22–41)
MCH RBC QN AUTO: 30.4 PG (ref 27–33)
MCHC RBC AUTO-ENTMCNC: 33.1 G/DL (ref 33.6–35)
MCV RBC AUTO: 91.8 FL (ref 81.4–97.8)
MICRO URNS: NORMAL
MONOCYTES # BLD AUTO: 0.61 K/UL (ref 0–0.85)
MONOCYTES NFR BLD AUTO: 10.9 % (ref 0–13.4)
NEUTROPHILS # BLD AUTO: 3.33 K/UL (ref 2–7.15)
NEUTROPHILS NFR BLD: 59.3 % (ref 44–72)
NITRITE UR QL STRIP.AUTO: NEGATIVE
NRBC # BLD AUTO: 0 K/UL
NRBC BLD-RTO: 0 /100 WBC
PH UR STRIP.AUTO: 7.5 [PH] (ref 5–8)
PLATELET # BLD AUTO: 257 K/UL (ref 164–446)
PMV BLD AUTO: 9.7 FL (ref 9–12.9)
POTASSIUM SERPL-SCNC: 4.1 MMOL/L (ref 3.6–5.5)
POTASSIUM SERPL-SCNC: 4.2 MMOL/L (ref 3.6–5.5)
PROT SERPL-MCNC: 7 G/DL (ref 6–8.2)
PROT SERPL-MCNC: 7 G/DL (ref 6–8.2)
PROT UR QL STRIP: NEGATIVE MG/DL
RBC # BLD AUTO: 4.9 M/UL (ref 4.2–5.4)
RBC UR QL AUTO: NEGATIVE
SODIUM SERPL-SCNC: 137 MMOL/L (ref 135–145)
SODIUM SERPL-SCNC: 138 MMOL/L (ref 135–145)
SP GR UR STRIP.AUTO: 1.01
T4 FREE SERPL-MCNC: 1.51 NG/DL (ref 0.93–1.7)
THYROPEROXIDASE AB SERPL-ACNC: 21 IU/ML (ref 0–9)
TRIGL SERPL-MCNC: 100 MG/DL (ref 0–149)
TSH SERPL DL<=0.005 MIU/L-ACNC: 2.64 UIU/ML (ref 0.38–5.33)
WBC # BLD AUTO: 5.6 K/UL (ref 4.8–10.8)

## 2021-10-20 PROCEDURE — 81003 URINALYSIS AUTO W/O SCOPE: CPT

## 2021-10-20 PROCEDURE — 84165 PROTEIN E-PHORESIS SERUM: CPT

## 2021-10-20 PROCEDURE — 86160 COMPLEMENT ANTIGEN: CPT

## 2021-10-20 PROCEDURE — 80053 COMPREHEN METABOLIC PANEL: CPT | Mod: 91

## 2021-10-20 PROCEDURE — 84155 ASSAY OF PROTEIN SERUM: CPT | Mod: XU

## 2021-10-20 PROCEDURE — 36415 COLL VENOUS BLD VENIPUNCTURE: CPT

## 2021-10-20 PROCEDURE — 84439 ASSAY OF FREE THYROXINE: CPT

## 2021-10-20 PROCEDURE — 86376 MICROSOMAL ANTIBODY EACH: CPT

## 2021-10-20 PROCEDURE — 80053 COMPREHEN METABOLIC PANEL: CPT

## 2021-10-20 PROCEDURE — 85652 RBC SED RATE AUTOMATED: CPT

## 2021-10-20 PROCEDURE — 85025 COMPLETE CBC W/AUTO DIFF WBC: CPT

## 2021-10-20 PROCEDURE — 80061 LIPID PANEL: CPT

## 2021-10-20 PROCEDURE — 84443 ASSAY THYROID STIM HORMONE: CPT

## 2021-10-20 PROCEDURE — 86140 C-REACTIVE PROTEIN: CPT

## 2021-10-24 LAB
ALBUMIN SERPL ELPH-MCNC: 3.74 G/DL (ref 3.75–5.01)
ALPHA1 GLOB SERPL ELPH-MCNC: 0.3 G/DL (ref 0.19–0.46)
ALPHA2 GLOB SERPL ELPH-MCNC: 0.82 G/DL (ref 0.48–1.05)
B-GLOBULIN SERPL ELPH-MCNC: 0.84 G/DL (ref 0.48–1.1)
EER PROT ELECT SER Q1092: ABNORMAL
GAMMA GLOB SERPL ELPH-MCNC: 0.9 G/DL (ref 0.62–1.51)
INTERPRETATION SERPL IFE-IMP: ABNORMAL
PROT SERPL-MCNC: 6.6 G/DL (ref 6.3–8.2)

## 2021-10-28 ENCOUNTER — OFFICE VISIT (OUTPATIENT)
Dept: MEDICAL GROUP | Facility: MEDICAL CENTER | Age: 66
End: 2021-10-28
Payer: MEDICARE

## 2021-10-28 VITALS
DIASTOLIC BLOOD PRESSURE: 80 MMHG | BODY MASS INDEX: 37.59 KG/M2 | WEIGHT: 225.6 LBS | SYSTOLIC BLOOD PRESSURE: 122 MMHG | HEIGHT: 65 IN | HEART RATE: 46 BPM | OXYGEN SATURATION: 97 % | TEMPERATURE: 97.4 F

## 2021-10-28 DIAGNOSIS — M05.79 RHEUMATOID ARTHRITIS INVOLVING MULTIPLE SITES WITH POSITIVE RHEUMATOID FACTOR (HCC): ICD-10-CM

## 2021-10-28 DIAGNOSIS — K52.9 CHRONIC DIARRHEA: ICD-10-CM

## 2021-10-28 DIAGNOSIS — E03.9 ACQUIRED HYPOTHYROIDISM: ICD-10-CM

## 2021-10-28 DIAGNOSIS — I10 ESSENTIAL HYPERTENSION: ICD-10-CM

## 2021-10-28 DIAGNOSIS — E06.9 THYROIDITIS: ICD-10-CM

## 2021-10-28 DIAGNOSIS — E78.5 HYPERLIPIDEMIA LDL GOAL <100: ICD-10-CM

## 2021-10-28 PROCEDURE — 99214 OFFICE O/P EST MOD 30 MIN: CPT | Performed by: NURSE PRACTITIONER

## 2021-10-28 ASSESSMENT — FIBROSIS 4 INDEX: FIB4 SCORE: 1.16

## 2021-10-28 NOTE — PROGRESS NOTES
Subjective:     Elizabeth Nuñez is a 66 y.o. female who presents with hypothyroidism.    HPI:   Seen in f/u for hypothyroidism.  She is feeling well  Saw rheumatology yesterday for her srogrens and RA.  She is stable on plaquenil.  Overall she is feeling well but had some joint pain inher shoulders this week d/t weather.   Reviewed lab with pt.  Her TPO is 21.  TSH is now normal.  Her dose was adjusted last month for TSH too high.  Taking med approp.  T4 is wnl.    CMP, GFR, LP is wnl.  S he is stable and well controlled on crestor.  Taking med approp.  No s/e to meds.    She is followed by GI for chronic diarrhea. Has a hx of colitis.  Her last colonoscopy was in 2017.  When she last saw GI he said if her diarrhea continues she will need sooner colonoscopy.  She feels her diarrhea now is d/t plaquenil and not colitis. No blood in stool.  occas abd pain.  Pain is very short time.  No black tarry stools.  Can have 1 or more stools daily but not always diarrhea.  Does have formed stools.  diarreha is based on time she takes her meds and what she eats.    She is not always on a healthy diet.  Remains active and exercise regularly. She goes [place] pool about 2x[place]wk[place]  [place] there.   Reviewed lab with pt.  CMP, GFR, LP, TSH, T4 is wnl.    TPO is elevated at 21.  Her TSH was low last month and med adjusted.  Now wnl.  Taking med approp.  LP is now wnl.  She is stable on crestor.  No s/e to med.  trg and HDL at goal.  LDL  Now at goal and down from 114 to 96.  She was changed to crestor last appt  BP is stable and well controlled.  Not on meds for HTN.        Patient Active Problem List    Diagnosis Date Noted   • Sjogren's syndrome (HCC)    • Rheumatoid arthritis (HCC)    • Obesity (BMI 35.0-39.9 without comorbidity) (HCC) 06/09/2020   • IFG (impaired fasting glucose) 01/05/2019   • Obesity (BMI 30-39.9) 07/13/2017   • Allergy to pollen    • Hypertension 02/22/2012   • Syncope  05/20/2010   • Hypothyroid 04/22/2009   • Pulmonary nodule 04/22/2009   • Dyslipidemia 04/22/2009   • Pruritic dermatitis 04/22/2009   • History of diverticulitis of colon 04/22/2009   • Carpal tunnel syndrome 04/22/2009   • Adenomatous polyp of colon 04/22/2009   • GERD (gastroesophageal reflux disease) 04/22/2009       Current medicines (including changes today)  Current Outpatient Medications   Medication Sig Dispense Refill   • magnesium gluconate (MAG-G) 500 MG tablet Take 500 mg by mouth every day.     • hydroxychloroquine (PLAQUENIL) 200 MG Tab Take 1 Tablet by mouth 2 times a day. 180 Tablet 2   • lisinopril (PRINIVIL) 10 MG Tab Take 1 Tablet by mouth 2 times a day. TAKE 1 TABLET BY MOUTH TWICE A DAY 60 Tablet 1   • acyclovir (ZOVIRAX) 400 MG tablet TAKE 1 TABLET BY MOUTH TWICE A  Tablet 1   • levothyroxine (SYNTHROID) 137 MCG Tab Take 1 Tablet by mouth every morning on an empty stomach. 90 Tablet 0   • rosuvastatin (CRESTOR) 5 MG Tab Take 1 Tablet by mouth every 48 hours. 15 Tablet 2   • ketoconazole (NIZORAL) 2 % Cream Apply 1 Application topically 2 times a day. 30 g 1   • albuterol 108 (90 Base) MCG/ACT Aero Soln inhalation aerosol Inhale 2 Puffs by mouth every 6 hours as needed for Shortness of Breath. 8.5 g 1   • Calcium Carb-Cholecalciferol (CALCIUM 500 +D PO) Take 3 Tabs by mouth every evening.     • B Complex Cap Take 1 Cap by mouth every day. 30 Cap 0   • OMEPRAZOLE 20 MG PO CPDR Take 20 mg by mouth as needed (For heartburn).  11     No current facility-administered medications for this visit.       Allergies   Allergen Reactions   • Morphine Hives and Itching   • Sulfa Drugs Hives and Itching   • Quinine      Hives       ROS  Constitutional: Negative. Negative for fever, chills, weight loss, malaise/fatigue and diaphoresis.   HENT: Negative. Negative for hearing loss, ear pain, nosebleeds, congestion, sore throat, neck pain, tinnitus and ear discharge.   Respiratory: Negative. Negative for  "cough, hemoptysis, sputum production, shortness of breath, wheezing and stridor.   Cardiovascular: Negative. Negative for chest pain, palpitations, orthopnea, claudication, leg swelling and PND.   Gastrointestinal: Denies nausea, vomiting, diarrhea, constipation, heartburn, melena or hematochezia.  Genitourinary: Denies dysuria, hematuria, urinary incontinence, frequency or urgency.        Objective:     /80 (BP Location: Right arm, Patient Position: Sitting)   Pulse (!) 46   Temp 36.3 °C (97.4 °F) (Temporal)   Ht 1.651 m (5' 5\")   Wt 102 kg (225 lb 9.6 oz)   SpO2 97%  Body mass index is 37.54 kg/m².    Physical Exam:  Vitals reviewed.  Constitutional: Oriented to person, place, and time. appears well-developed and well-nourished. No distress.   Cardiovascular: Normal rate, regular rhythm, normal heart sounds and intact distal pulses. Exam reveals no gallop and no friction rub. No murmur heard. No carotid bruits.   Pulmonary/Chest: Effort normal and breath sounds normal. No stridor. No respiratory distress. no wheezes or rales. exhibits no tenderness.   Musculoskeletal: Normal range of motion. exhibits no edema. norma pedal pulses 2+.  Lymphadenopathy: No cervical or supraclavicular adenopathy.   Neurological: Alert and oriented to person, place, and time. exhibits normal muscle tone.  Skin: Skin is warm and dry. No diaphoresis.   Psychiatric: Normal mood and affect. Behavior is normal.      Assessment and Plan:     The following treatment plan was discussed:    1. Thyroiditis  US-THYROID    FREE THYROXINE    TSH    T3 FREE    do thyroid us.  f/u w/pt w/results.  if lab wnl will then repeat lab and f/u 6 mo.     2. Acquired hypothyroidism  US-THYROID    FREE THYROXINE    TSH    T3 FREE    lab shows TSH, T4 now wnl.  TPO high.  do updated lab 2 mo.  check thyroid us.  f/u w/pt w/results then f/u 6 mo or sooner if lab is abn   3. Rheumatoid arthritis involving multiple sites with positive rheumatoid factor " (HCC)      stable and followed by rheumatology   4. Hyperlipidemia LDL goal <100      LP improved on crestor.  enc pt to improve healthy diet and do regular exercise.  will repeat LP in 1 yr.    5. Essential hypertension      stable and well controlled w/o meds.     6. Chronic diarrhea      she r/t plaquenil and diet.  she will f/u with GI to see when next colonoscopy due.         Followup: Return in about 6 months (around 4/28/2022), or call for lab slip.

## 2021-11-03 ENCOUNTER — HOSPITAL ENCOUNTER (OUTPATIENT)
Dept: RADIOLOGY | Facility: MEDICAL CENTER | Age: 66
End: 2021-11-03
Attending: NURSE PRACTITIONER
Payer: MEDICARE

## 2021-11-03 ENCOUNTER — TELEPHONE (OUTPATIENT)
Dept: MEDICAL GROUP | Facility: MEDICAL CENTER | Age: 66
End: 2021-11-03

## 2021-11-03 DIAGNOSIS — E03.9 ACQUIRED HYPOTHYROIDISM: ICD-10-CM

## 2021-11-03 DIAGNOSIS — E06.9 THYROIDITIS: ICD-10-CM

## 2021-11-03 PROCEDURE — 76536 US EXAM OF HEAD AND NECK: CPT

## 2021-11-23 DIAGNOSIS — B02.8 HERPES ZOSTER WITH COMPLICATION: ICD-10-CM

## 2021-11-23 RX ORDER — ACYCLOVIR 400 MG/1
TABLET ORAL
Qty: 180 TABLET | Refills: 1 | Status: SHIPPED | OUTPATIENT
Start: 2021-11-23 | End: 2022-05-03 | Stop reason: SDUPTHER

## 2022-01-03 DIAGNOSIS — I10 ESSENTIAL HYPERTENSION: Chronic | ICD-10-CM

## 2022-01-03 RX ORDER — LISINOPRIL 10 MG/1
10 TABLET ORAL 2 TIMES DAILY
Qty: 60 TABLET | Refills: 2 | Status: SHIPPED | OUTPATIENT
Start: 2022-01-03 | End: 2022-04-04 | Stop reason: SDUPTHER

## 2022-01-06 ENCOUNTER — HOSPITAL ENCOUNTER (OUTPATIENT)
Dept: LAB | Facility: MEDICAL CENTER | Age: 67
End: 2022-01-06
Attending: NURSE PRACTITIONER
Payer: MEDICARE

## 2022-01-06 DIAGNOSIS — E03.9 ACQUIRED HYPOTHYROIDISM: ICD-10-CM

## 2022-01-06 DIAGNOSIS — E06.9 THYROIDITIS: ICD-10-CM

## 2022-01-06 LAB
T3FREE SERPL-MCNC: 2.25 PG/ML (ref 2–4.4)
T4 FREE SERPL-MCNC: 1.33 NG/DL (ref 0.93–1.7)
TSH SERPL DL<=0.005 MIU/L-ACNC: 2.74 UIU/ML (ref 0.38–5.33)

## 2022-01-06 PROCEDURE — 84439 ASSAY OF FREE THYROXINE: CPT

## 2022-01-06 PROCEDURE — 84443 ASSAY THYROID STIM HORMONE: CPT

## 2022-01-06 PROCEDURE — 84481 FREE ASSAY (FT-3): CPT

## 2022-01-06 PROCEDURE — 36415 COLL VENOUS BLD VENIPUNCTURE: CPT

## 2022-01-12 ENCOUNTER — TELEPHONE (OUTPATIENT)
Dept: MEDICAL GROUP | Facility: MEDICAL CENTER | Age: 67
End: 2022-01-12

## 2022-03-30 DIAGNOSIS — E55.9 VITAMIN D DEFICIENCY: ICD-10-CM

## 2022-03-30 DIAGNOSIS — E78.5 HYPERLIPIDEMIA LDL GOAL <100: ICD-10-CM

## 2022-03-30 DIAGNOSIS — I10 ESSENTIAL HYPERTENSION: ICD-10-CM

## 2022-04-13 ENCOUNTER — HOSPITAL ENCOUNTER (OUTPATIENT)
Dept: LAB | Facility: MEDICAL CENTER | Age: 67
End: 2022-04-13
Attending: NURSE PRACTITIONER
Payer: MEDICARE

## 2022-04-13 DIAGNOSIS — E55.9 VITAMIN D DEFICIENCY: ICD-10-CM

## 2022-04-13 DIAGNOSIS — Z79.899 HIGH RISK MEDICATION USE: ICD-10-CM

## 2022-04-13 DIAGNOSIS — K52.839 MICROSCOPIC COLITIS, UNSPECIFIED MICROSCOPIC COLITIS TYPE: ICD-10-CM

## 2022-04-13 DIAGNOSIS — M35.00 SJOGREN'S SYNDROME, WITH UNSPECIFIED ORGAN INVOLVEMENT (HCC): ICD-10-CM

## 2022-04-13 DIAGNOSIS — M05.79 RHEUMATOID ARTHRITIS INVOLVING MULTIPLE SITES WITH POSITIVE RHEUMATOID FACTOR (HCC): ICD-10-CM

## 2022-04-13 DIAGNOSIS — I10 ESSENTIAL HYPERTENSION: ICD-10-CM

## 2022-04-13 DIAGNOSIS — Z79.899 LONG-TERM USE OF PLAQUENIL: ICD-10-CM

## 2022-04-13 DIAGNOSIS — E78.5 HYPERLIPIDEMIA LDL GOAL <100: ICD-10-CM

## 2022-04-13 LAB
25(OH)D3 SERPL-MCNC: 33 NG/ML (ref 30–100)
ALBUMIN SERPL BCP-MCNC: 4.2 G/DL (ref 3.2–4.9)
ALBUMIN SERPL BCP-MCNC: 4.3 G/DL (ref 3.2–4.9)
ALBUMIN/GLOB SERPL: 1.4 G/DL
ALBUMIN/GLOB SERPL: 1.5 G/DL
ALP SERPL-CCNC: 78 U/L (ref 30–99)
ALP SERPL-CCNC: 79 U/L (ref 30–99)
ALT SERPL-CCNC: 18 U/L (ref 2–50)
ALT SERPL-CCNC: 18 U/L (ref 2–50)
ANION GAP SERPL CALC-SCNC: 11 MMOL/L (ref 7–16)
ANION GAP SERPL CALC-SCNC: 9 MMOL/L (ref 7–16)
APPEARANCE UR: CLEAR
AST SERPL-CCNC: 16 U/L (ref 12–45)
AST SERPL-CCNC: 17 U/L (ref 12–45)
BILIRUB SERPL-MCNC: 0.8 MG/DL (ref 0.1–1.5)
BILIRUB SERPL-MCNC: 0.9 MG/DL (ref 0.1–1.5)
BILIRUB UR QL STRIP.AUTO: NEGATIVE
BUN SERPL-MCNC: 12 MG/DL (ref 8–22)
BUN SERPL-MCNC: 13 MG/DL (ref 8–22)
C3 SERPL-MCNC: 142 MG/DL (ref 87–200)
C4 SERPL-MCNC: 26.7 MG/DL (ref 19–52)
CALCIUM SERPL-MCNC: 9.4 MG/DL (ref 8.4–10.2)
CALCIUM SERPL-MCNC: 9.5 MG/DL (ref 8.4–10.2)
CHLORIDE SERPL-SCNC: 101 MMOL/L (ref 96–112)
CHLORIDE SERPL-SCNC: 102 MMOL/L (ref 96–112)
CHOLEST SERPL-MCNC: 190 MG/DL (ref 100–199)
CO2 SERPL-SCNC: 26 MMOL/L (ref 20–33)
CO2 SERPL-SCNC: 27 MMOL/L (ref 20–33)
COLOR UR: YELLOW
CREAT SERPL-MCNC: 0.67 MG/DL (ref 0.5–1.4)
CREAT SERPL-MCNC: 0.69 MG/DL (ref 0.5–1.4)
CREAT UR-MCNC: 79.9 MG/DL
CRP SERPL HS-MCNC: 0.93 MG/DL (ref 0–0.75)
ERYTHROCYTE [SEDIMENTATION RATE] IN BLOOD BY WESTERGREN METHOD: 1 MM/HOUR (ref 0–25)
FASTING STATUS PATIENT QL REPORTED: NORMAL
FASTING STATUS PATIENT QL REPORTED: NORMAL
GFR SERPLBLD CREATININE-BSD FMLA CKD-EPI: 96 ML/MIN/1.73 M 2
GFR SERPLBLD CREATININE-BSD FMLA CKD-EPI: 96 ML/MIN/1.73 M 2
GLOBULIN SER CALC-MCNC: 2.9 G/DL (ref 1.9–3.5)
GLOBULIN SER CALC-MCNC: 3 G/DL (ref 1.9–3.5)
GLUCOSE SERPL-MCNC: 103 MG/DL (ref 65–99)
GLUCOSE SERPL-MCNC: 104 MG/DL (ref 65–99)
GLUCOSE UR STRIP.AUTO-MCNC: NEGATIVE MG/DL
HDLC SERPL-MCNC: 65 MG/DL
KETONES UR STRIP.AUTO-MCNC: NEGATIVE MG/DL
LDLC SERPL CALC-MCNC: 107 MG/DL
LEUKOCYTE ESTERASE UR QL STRIP.AUTO: NEGATIVE
MICRO URNS: NORMAL
MICROALBUMIN UR-MCNC: <1.2 MG/DL
MICROALBUMIN/CREAT UR: NORMAL MG/G (ref 0–30)
NITRITE UR QL STRIP.AUTO: NEGATIVE
PH UR STRIP.AUTO: 7 [PH] (ref 5–8)
POTASSIUM SERPL-SCNC: 4.2 MMOL/L (ref 3.6–5.5)
POTASSIUM SERPL-SCNC: 4.5 MMOL/L (ref 3.6–5.5)
PROT SERPL-MCNC: 7.2 G/DL (ref 6–8.2)
PROT SERPL-MCNC: 7.2 G/DL (ref 6–8.2)
PROT UR QL STRIP: NEGATIVE MG/DL
RBC UR QL AUTO: NEGATIVE
SODIUM SERPL-SCNC: 138 MMOL/L (ref 135–145)
SODIUM SERPL-SCNC: 138 MMOL/L (ref 135–145)
SP GR UR STRIP.AUTO: 1.01
TRIGL SERPL-MCNC: 90 MG/DL (ref 0–149)

## 2022-04-13 PROCEDURE — 82306 VITAMIN D 25 HYDROXY: CPT

## 2022-04-13 PROCEDURE — 86140 C-REACTIVE PROTEIN: CPT

## 2022-04-13 PROCEDURE — 36415 COLL VENOUS BLD VENIPUNCTURE: CPT

## 2022-04-13 PROCEDURE — 81003 URINALYSIS AUTO W/O SCOPE: CPT

## 2022-04-13 PROCEDURE — 80053 COMPREHEN METABOLIC PANEL: CPT | Mod: 91

## 2022-04-13 PROCEDURE — 82043 UR ALBUMIN QUANTITATIVE: CPT

## 2022-04-13 PROCEDURE — 85652 RBC SED RATE AUTOMATED: CPT

## 2022-04-13 PROCEDURE — 86160 COMPLEMENT ANTIGEN: CPT

## 2022-04-13 PROCEDURE — 82570 ASSAY OF URINE CREATININE: CPT

## 2022-04-13 PROCEDURE — 80061 LIPID PANEL: CPT

## 2022-04-13 PROCEDURE — 80053 COMPREHEN METABOLIC PANEL: CPT

## 2022-04-22 ENCOUNTER — HOSPITAL ENCOUNTER (OUTPATIENT)
Dept: RADIOLOGY | Facility: MEDICAL CENTER | Age: 67
End: 2022-04-22
Attending: NURSE PRACTITIONER
Payer: MEDICARE

## 2022-04-22 DIAGNOSIS — K62.89 CHRONIC IDIOPATHIC ANAL PAIN: ICD-10-CM

## 2022-04-22 DIAGNOSIS — G89.29 CHRONIC IDIOPATHIC ANAL PAIN: ICD-10-CM

## 2022-04-22 DIAGNOSIS — M54.2 CERVICALGIA: ICD-10-CM

## 2022-04-22 PROCEDURE — 72040 X-RAY EXAM NECK SPINE 2-3 VW: CPT

## 2022-05-03 ENCOUNTER — OFFICE VISIT (OUTPATIENT)
Dept: MEDICAL GROUP | Facility: MEDICAL CENTER | Age: 67
End: 2022-05-03
Payer: MEDICARE

## 2022-05-03 VITALS
SYSTOLIC BLOOD PRESSURE: 120 MMHG | TEMPERATURE: 97.8 F | DIASTOLIC BLOOD PRESSURE: 70 MMHG | HEART RATE: 55 BPM | WEIGHT: 227 LBS | OXYGEN SATURATION: 96 % | BODY MASS INDEX: 37.82 KG/M2 | HEIGHT: 65 IN

## 2022-05-03 DIAGNOSIS — J45.20 MILD INTERMITTENT ASTHMA WITHOUT COMPLICATION: ICD-10-CM

## 2022-05-03 DIAGNOSIS — E78.5 HYPERLIPIDEMIA LDL GOAL <100: ICD-10-CM

## 2022-05-03 DIAGNOSIS — B02.8 HERPES ZOSTER WITH COMPLICATION: ICD-10-CM

## 2022-05-03 DIAGNOSIS — Z23 NEED FOR PNEUMOCOCCAL VACCINATION: ICD-10-CM

## 2022-05-03 DIAGNOSIS — B37.9 CANDIDIASIS: ICD-10-CM

## 2022-05-03 DIAGNOSIS — I10 ESSENTIAL HYPERTENSION: Chronic | ICD-10-CM

## 2022-05-03 DIAGNOSIS — N95.9 POST MENOPAUSAL PROBLEMS: ICD-10-CM

## 2022-05-03 PROCEDURE — 90732 PPSV23 VACC 2 YRS+ SUBQ/IM: CPT | Performed by: NURSE PRACTITIONER

## 2022-05-03 PROCEDURE — G0009 ADMIN PNEUMOCOCCAL VACCINE: HCPCS | Performed by: NURSE PRACTITIONER

## 2022-05-03 PROCEDURE — 99214 OFFICE O/P EST MOD 30 MIN: CPT | Mod: 25 | Performed by: NURSE PRACTITIONER

## 2022-05-03 RX ORDER — ALBUTEROL SULFATE 90 UG/1
2 AEROSOL, METERED RESPIRATORY (INHALATION) EVERY 6 HOURS PRN
Qty: 8.5 G | Refills: 1 | Status: SHIPPED | OUTPATIENT
Start: 2022-05-03 | End: 2024-03-13 | Stop reason: SDUPTHER

## 2022-05-03 RX ORDER — LISINOPRIL 10 MG/1
10 TABLET ORAL 2 TIMES DAILY
Qty: 180 TABLET | Refills: 1 | Status: SHIPPED | OUTPATIENT
Start: 2022-05-03 | End: 2022-07-05 | Stop reason: SDUPTHER

## 2022-05-03 RX ORDER — ACYCLOVIR 400 MG/1
TABLET ORAL
Qty: 180 TABLET | Refills: 1 | Status: SHIPPED | OUTPATIENT
Start: 2022-05-03 | End: 2022-11-26

## 2022-05-03 RX ORDER — ROSUVASTATIN CALCIUM 5 MG/1
5 TABLET, COATED ORAL
Qty: 15 TABLET | Refills: 2 | Status: SHIPPED | OUTPATIENT
Start: 2022-05-03 | End: 2022-09-29 | Stop reason: SDUPTHER

## 2022-05-03 RX ORDER — KETOCONAZOLE 20 MG/G
1 CREAM TOPICAL 2 TIMES DAILY
Qty: 30 G | Refills: 1 | Status: SHIPPED | OUTPATIENT
Start: 2022-05-03

## 2022-05-03 ASSESSMENT — PATIENT HEALTH QUESTIONNAIRE - PHQ9: CLINICAL INTERPRETATION OF PHQ2 SCORE: 0

## 2022-05-03 ASSESSMENT — FIBROSIS 4 INDEX: FIB4 SCORE: 1.01

## 2022-05-03 NOTE — PROGRESS NOTES
Subjective:     Elizabeth Nuñez is a 66 y.o. female who presents with hyperlipidemia.    HPI:   Seen in f/u for hyperlipidemia.  She was out of crestor so she restarted her lipitor since she has a lot of it.  She has more hip pain at nite on lipitor but still has some hip pain with crestor.   She needs refill on her crestor.    She also uses acyclovir daily for HSV.  If she tries to stop it she will have a breakout.  Her sx on her left temple and goes into ear.  We will continue chronically to prevent sx.  Needs refioll.  She needs refill of ketoconazole.  She uses occas, priscilla in summer for yeast rash.  BP is stable and well controlled on med.  Needs lisinopril.    She has an asthmatic bronchitis when she sets URI.  She uses the rescue then with those sx only.  Needs refill as hers is very otudated   She is sched for her mammo but also needs dexa scan.    Bp is stable and well controlled.  Bp normal today. Taking med approp.  No s/e to med.  \  Reviewed lab with pt.  GFR, CMP, alb/cr ratio is wnl.  Vitamin d is low normal at 30's.  She is on 2000 units d3 daily.  Will make sure she is on that amt.  LP shows good trg and HDL. LDL is up from last time.  On crestor LDL was down to 96.  Goal is <100.  On lipitor since she ran out of crestor LDL is up to 107.  Needs crestor RF.      Patient Active Problem List    Diagnosis Date Noted   • Sjogren's syndrome (HCC)    • Rheumatoid arthritis (HCC)    • Obesity (BMI 35.0-39.9 without comorbidity) (HCC) 06/09/2020   • IFG (impaired fasting glucose) 01/05/2019   • Obesity (BMI 30-39.9) 07/13/2017   • Allergy to pollen    • Hypertension 02/22/2012   • Syncope 05/20/2010   • Hypothyroid 04/22/2009   • Pulmonary nodule 04/22/2009   • Dyslipidemia 04/22/2009   • Pruritic dermatitis 04/22/2009   • History of diverticulitis of colon 04/22/2009   • Carpal tunnel syndrome 04/22/2009   • Adenomatous polyp of colon 04/22/2009   • GERD (gastroesophageal reflux disease) 04/22/2009        Current medicines (including changes today)  Current Outpatient Medications   Medication Sig Dispense Refill   • lisinopril (PRINIVIL) 10 MG Tab Take 1 Tablet by mouth 2 times a day. TAKE 1 TABLET BY MOUTH TWICE A  Tablet 1   • rosuvastatin (CRESTOR) 5 MG Tab Take 1 Tablet by mouth every 48 hours. 15 Tablet 2   • acyclovir (ZOVIRAX) 400 MG tablet TAKE 1 TABLET BY MOUTH TWICE A  Tablet 1   • ketoconazole (NIZORAL) 2 % Cream Apply 1 Application topically 2 times a day. 30 g 1   • albuterol 108 (90 Base) MCG/ACT Aero Soln inhalation aerosol Inhale 2 Puffs every 6 hours as needed for Shortness of Breath. 8.5 g 1   • Cholecalciferol (VITAMIN D-3) 25 MCG (1000 UT) Cap Take  by mouth.     • levothyroxine (SYNTHROID) 137 MCG Tab Take 1 Tablet by mouth every morning on an empty stomach. 90 Tablet 2   • magnesium gluconate (MAG-G) 500 MG tablet Take 500 mg by mouth every day.     • hydroxychloroquine (PLAQUENIL) 200 MG Tab Take 1 Tablet by mouth 2 times a day. 180 Tablet 2   • Calcium Carb-Cholecalciferol (CALCIUM 500 +D PO) Take 3 Tabs by mouth every evening.     • B Complex Cap Take 1 Cap by mouth every day. 30 Cap 0   • OMEPRAZOLE 20 MG PO CPDR Take 20 mg by mouth as needed (For heartburn).  11     No current facility-administered medications for this visit.       Allergies   Allergen Reactions   • Morphine Hives and Itching   • Sulfa Drugs Hives and Itching   • Quinine      Hives       ROS  Constitutional: Negative. Negative for fever, chills, weight loss, malaise/fatigue and diaphoresis.   HENT: Negative. Negative for hearing loss, ear pain, nosebleeds, congestion, sore throat, neck pain, tinnitus and ear discharge.   Respiratory: Negative. Negative for cough, hemoptysis, sputum production, shortness of breath, wheezing and stridor.   Cardiovascular: Negative. Negative for chest pain, palpitations, orthopnea, claudication, leg swelling and PND.   Gastrointestinal: Denies nausea, vomiting, diarrhea,  "constipation, heartburn, melena or hematochezia.  Genitourinary: Denies dysuria, hematuria, urinary incontinence, frequency or urgency.        Objective:     /70 (BP Location: Right arm, Patient Position: Sitting, BP Cuff Size: Large adult)   Pulse (!) 55   Temp 36.6 °C (97.8 °F) (Temporal)   Ht 1.651 m (5' 5\")   Wt 103 kg (227 lb)   SpO2 96%  Body mass index is 37.77 kg/m².    Physical Exam:  Vitals reviewed.  Constitutional: Oriented to person, place, and time. appears well-developed and well-nourished. No distress.   Cardiovascular: Normal rate, regular rhythm, normal heart sounds and intact distal pulses. Exam reveals no gallop and no friction rub. No murmur heard. No carotid bruits.   Pulmonary/Chest: Effort normal and breath sounds normal. No stridor. No respiratory distress. no wheezes or rales. exhibits no tenderness.   Musculoskeletal: Normal range of motion. exhibits no edema. norma pedal pulses 2+.  Lymphadenopathy: No cervical or supraclavicular adenopathy.   Neurological: Alert and oriented to person, place, and time. exhibits normal muscle tone.  Skin: Skin is warm and dry. No diaphoresis.   Psychiatric: Normal mood and affect. Behavior is normal.      Assessment and Plan:     The following treatment plan was discussed:    1. Essential hypertension  lisinopril (PRINIVIL) 10 MG Tab    bp stable and controlled on meds.  taking meds approp   2. Hyperlipidemia LDL goal <100  rosuvastatin (CRESTOR) 5 MG Tab    LDL up on lipitor.  RF and restart crestor.  continue healthy diet and regular exercise.    3. Herpes zoster with complication  acyclovir (ZOVIRAX) 400 MG tablet    refilled acyclovir for chronic use d/t recurrent shingles sx if she stops the med.     4. Candidiasis  ketoconazole (NIZORAL) 2 % Cream    use ketoconazole crm prn for yeast rash.  RF med   5. Mild intermittent asthma without complication  albuterol 108 (90 Base) MCG/ACT Aero Soln inhalation aerosol    refilled rescue inhaler.  " has sx and only uses when she has URI   6. Post menopausal problems  DS-BONE DENSITY STUDY (DEXA)    dexa scan ordered   7. Need for pneumococcal vaccination  Pneumococal Polysaccharide Vaccine 23-Valent =>1YO SQ/IM    PPV 23 DONE TODAY         Followup: Return in about 1 year (around 5/3/2023), or call for lab slip.

## 2022-06-17 ENCOUNTER — HOSPITAL ENCOUNTER (OUTPATIENT)
Dept: RADIOLOGY | Facility: MEDICAL CENTER | Age: 67
End: 2022-06-17
Attending: NURSE PRACTITIONER
Payer: MEDICARE

## 2022-06-17 DIAGNOSIS — Z12.31 VISIT FOR SCREENING MAMMOGRAM: ICD-10-CM

## 2022-06-17 PROCEDURE — 77063 BREAST TOMOSYNTHESIS BI: CPT

## 2022-06-21 ENCOUNTER — APPOINTMENT (RX ONLY)
Dept: URBAN - METROPOLITAN AREA CLINIC 35 | Facility: CLINIC | Age: 67
Setting detail: DERMATOLOGY
End: 2022-06-21

## 2022-06-21 DIAGNOSIS — L82.1 OTHER SEBORRHEIC KERATOSIS: ICD-10-CM

## 2022-06-21 DIAGNOSIS — D22 MELANOCYTIC NEVI: ICD-10-CM

## 2022-06-21 DIAGNOSIS — Z71.89 OTHER SPECIFIED COUNSELING: ICD-10-CM

## 2022-06-21 DIAGNOSIS — L81.4 OTHER MELANIN HYPERPIGMENTATION: ICD-10-CM

## 2022-06-21 PROBLEM — D22.5 MELANOCYTIC NEVI OF TRUNK: Status: ACTIVE | Noted: 2022-06-21

## 2022-06-21 PROCEDURE — 99213 OFFICE O/P EST LOW 20 MIN: CPT

## 2022-06-21 PROCEDURE — ? COUNSELING

## 2022-06-21 ASSESSMENT — LOCATION SIMPLE DESCRIPTION DERM: LOCATION SIMPLE: RIGHT UPPER BACK

## 2022-06-21 ASSESSMENT — LOCATION ZONE DERM: LOCATION ZONE: TRUNK

## 2022-06-21 ASSESSMENT — LOCATION DETAILED DESCRIPTION DERM
LOCATION DETAILED: RIGHT MID-UPPER BACK
LOCATION DETAILED: RIGHT SUPERIOR UPPER BACK

## 2022-06-21 NOTE — PROCEDURE: MIPS QUALITY
Quality 111:Pneumonia Vaccination Status For Older Adults: Pneumococcal vaccine administered on or after patient’s 60th birthday and before the end of the measurement period
Quality 130: Documentation Of Current Medications In The Medical Record: Current Medications Documented
Quality 431: Preventive Care And Screening: Unhealthy Alcohol Use - Screening: Patient not identified as an unhealthy alcohol user when screened for unhealthy alcohol use using a systematic screening method
Quality 110: Preventive Care And Screening: Influenza Immunization: Influenza Immunization Administered during Influenza season
Quality 226: Preventive Care And Screening: Tobacco Use: Screening And Cessation Intervention: Patient screened for tobacco use and is an ex/non-smoker
Detail Level: Detailed

## 2022-07-13 ENCOUNTER — HOSPITAL ENCOUNTER (OUTPATIENT)
Dept: RADIOLOGY | Facility: MEDICAL CENTER | Age: 67
End: 2022-07-13
Attending: NURSE PRACTITIONER
Payer: MEDICARE

## 2022-07-13 DIAGNOSIS — N95.9 POST MENOPAUSAL PROBLEMS: ICD-10-CM

## 2022-07-13 PROCEDURE — 77080 DXA BONE DENSITY AXIAL: CPT

## 2022-08-12 ENCOUNTER — HOSPITAL ENCOUNTER (OUTPATIENT)
Dept: LAB | Facility: MEDICAL CENTER | Age: 67
End: 2022-08-12
Attending: NURSE PRACTITIONER
Payer: MEDICARE

## 2022-08-12 DIAGNOSIS — Z79.899 HIGH RISK MEDICATION USE: ICD-10-CM

## 2022-08-12 DIAGNOSIS — K52.839 MICROSCOPIC COLITIS, UNSPECIFIED MICROSCOPIC COLITIS TYPE: ICD-10-CM

## 2022-08-12 DIAGNOSIS — Z79.899 LONG-TERM USE OF PLAQUENIL: ICD-10-CM

## 2022-08-12 DIAGNOSIS — E03.9 ACQUIRED HYPOTHYROIDISM: ICD-10-CM

## 2022-08-12 DIAGNOSIS — M05.79 RHEUMATOID ARTHRITIS INVOLVING MULTIPLE SITES WITH POSITIVE RHEUMATOID FACTOR (HCC): ICD-10-CM

## 2022-08-12 DIAGNOSIS — M54.2 CHRONIC NECK PAIN: ICD-10-CM

## 2022-08-12 DIAGNOSIS — M35.00 SJOGREN'S SYNDROME, WITH UNSPECIFIED ORGAN INVOLVEMENT (HCC): ICD-10-CM

## 2022-08-12 DIAGNOSIS — I10 ESSENTIAL HYPERTENSION: ICD-10-CM

## 2022-08-12 DIAGNOSIS — G89.29 CHRONIC NECK PAIN: ICD-10-CM

## 2022-08-12 LAB
ALBUMIN SERPL BCP-MCNC: 4.3 G/DL (ref 3.2–4.9)
ALBUMIN/GLOB SERPL: 1.4 G/DL
ALP SERPL-CCNC: 79 U/L (ref 30–99)
ALT SERPL-CCNC: 22 U/L (ref 2–50)
ANION GAP SERPL CALC-SCNC: 11 MMOL/L (ref 7–16)
APPEARANCE UR: CLEAR
AST SERPL-CCNC: 19 U/L (ref 12–45)
BASOPHILS # BLD AUTO: 0.9 % (ref 0–1.8)
BASOPHILS # BLD: 0.06 K/UL (ref 0–0.12)
BILIRUB SERPL-MCNC: 0.8 MG/DL (ref 0.1–1.5)
BILIRUB UR QL STRIP.AUTO: NEGATIVE
BUN SERPL-MCNC: 12 MG/DL (ref 8–22)
C3 SERPL-MCNC: 147.2 MG/DL (ref 87–200)
C4 SERPL-MCNC: 26.6 MG/DL (ref 19–52)
CALCIUM SERPL-MCNC: 9.3 MG/DL (ref 8.4–10.2)
CHLORIDE SERPL-SCNC: 101 MMOL/L (ref 96–112)
CO2 SERPL-SCNC: 26 MMOL/L (ref 20–33)
COLOR UR: YELLOW
CREAT SERPL-MCNC: 0.68 MG/DL (ref 0.5–1.4)
CREAT UR-MCNC: 79.92 MG/DL
CRP SERPL HS-MCNC: 0.89 MG/DL (ref 0–0.75)
EOSINOPHIL # BLD AUTO: 0.17 K/UL (ref 0–0.51)
EOSINOPHIL NFR BLD: 2.6 % (ref 0–6.9)
ERYTHROCYTE [DISTWIDTH] IN BLOOD BY AUTOMATED COUNT: 47.6 FL (ref 35.9–50)
ERYTHROCYTE [SEDIMENTATION RATE] IN BLOOD BY WESTERGREN METHOD: 12 MM/HOUR (ref 0–25)
FASTING STATUS PATIENT QL REPORTED: NORMAL
GFR SERPLBLD CREATININE-BSD FMLA CKD-EPI: 96 ML/MIN/1.73 M 2
GLOBULIN SER CALC-MCNC: 3.1 G/DL (ref 1.9–3.5)
GLUCOSE SERPL-MCNC: 106 MG/DL (ref 65–99)
GLUCOSE UR STRIP.AUTO-MCNC: NEGATIVE MG/DL
HCT VFR BLD AUTO: 46.4 % (ref 37–47)
HGB BLD-MCNC: 15.1 G/DL (ref 12–16)
IMM GRANULOCYTES # BLD AUTO: 0.02 K/UL (ref 0–0.11)
IMM GRANULOCYTES NFR BLD AUTO: 0.3 % (ref 0–0.9)
KETONES UR STRIP.AUTO-MCNC: NEGATIVE MG/DL
LEUKOCYTE ESTERASE UR QL STRIP.AUTO: NEGATIVE
LYMPHOCYTES # BLD AUTO: 1.54 K/UL (ref 1–4.8)
LYMPHOCYTES NFR BLD: 23.2 % (ref 22–41)
MCH RBC QN AUTO: 30 PG (ref 27–33)
MCHC RBC AUTO-ENTMCNC: 32.5 G/DL (ref 33.6–35)
MCV RBC AUTO: 92.1 FL (ref 81.4–97.8)
MICRO URNS: NORMAL
MONOCYTES # BLD AUTO: 0.72 K/UL (ref 0–0.85)
MONOCYTES NFR BLD AUTO: 10.8 % (ref 0–13.4)
NEUTROPHILS # BLD AUTO: 4.14 K/UL (ref 2–7.15)
NEUTROPHILS NFR BLD: 62.2 % (ref 44–72)
NITRITE UR QL STRIP.AUTO: NEGATIVE
NRBC # BLD AUTO: 0 K/UL
NRBC BLD-RTO: 0 /100 WBC
PH UR STRIP.AUTO: 6.5 [PH] (ref 5–8)
PLATELET # BLD AUTO: 286 K/UL (ref 164–446)
PMV BLD AUTO: 9.2 FL (ref 9–12.9)
POTASSIUM SERPL-SCNC: 5.1 MMOL/L (ref 3.6–5.5)
PROT SERPL-MCNC: 7.4 G/DL (ref 6–8.2)
PROT UR QL STRIP: NEGATIVE MG/DL
PROT UR-MCNC: 6 MG/DL (ref 0–15)
PROT/CREAT UR: 75 MG/G (ref 10–107)
RBC # BLD AUTO: 5.04 M/UL (ref 4.2–5.4)
RBC UR QL AUTO: NEGATIVE
SODIUM SERPL-SCNC: 138 MMOL/L (ref 135–145)
SP GR UR STRIP.AUTO: 1.01
WBC # BLD AUTO: 6.7 K/UL (ref 4.8–10.8)

## 2022-08-12 PROCEDURE — 85652 RBC SED RATE AUTOMATED: CPT

## 2022-08-12 PROCEDURE — 86140 C-REACTIVE PROTEIN: CPT

## 2022-08-12 PROCEDURE — 86160 COMPLEMENT ANTIGEN: CPT | Mod: 91

## 2022-08-12 PROCEDURE — 84156 ASSAY OF PROTEIN URINE: CPT

## 2022-08-12 PROCEDURE — 80053 COMPREHEN METABOLIC PANEL: CPT

## 2022-08-12 PROCEDURE — 82570 ASSAY OF URINE CREATININE: CPT

## 2022-08-12 PROCEDURE — 81003 URINALYSIS AUTO W/O SCOPE: CPT

## 2022-08-12 PROCEDURE — 85025 COMPLETE CBC W/AUTO DIFF WBC: CPT

## 2022-08-12 PROCEDURE — 36415 COLL VENOUS BLD VENIPUNCTURE: CPT

## 2022-10-11 ENCOUNTER — HOSPITAL ENCOUNTER (OUTPATIENT)
Dept: LAB | Facility: MEDICAL CENTER | Age: 67
End: 2022-10-11
Attending: INTERNAL MEDICINE
Payer: MEDICARE

## 2022-10-11 DIAGNOSIS — M05.79 RHEUMATOID ARTHRITIS INVOLVING MULTIPLE SITES WITH POSITIVE RHEUMATOID FACTOR (HCC): ICD-10-CM

## 2022-10-11 DIAGNOSIS — Z79.899 HIGH RISK MEDICATION USE: ICD-10-CM

## 2022-10-11 LAB
ALBUMIN SERPL BCP-MCNC: 4.1 G/DL (ref 3.2–4.9)
ALBUMIN/GLOB SERPL: 1.4 G/DL
ALP SERPL-CCNC: 73 U/L (ref 30–99)
ALT SERPL-CCNC: 19 U/L (ref 2–50)
ANION GAP SERPL CALC-SCNC: 11 MMOL/L (ref 7–16)
AST SERPL-CCNC: 18 U/L (ref 12–45)
BASOPHILS # BLD AUTO: 0.7 % (ref 0–1.8)
BASOPHILS # BLD: 0.05 K/UL (ref 0–0.12)
BILIRUB SERPL-MCNC: 0.7 MG/DL (ref 0.1–1.5)
BUN SERPL-MCNC: 10 MG/DL (ref 8–22)
CALCIUM SERPL-MCNC: 9.4 MG/DL (ref 8.4–10.2)
CHLORIDE SERPL-SCNC: 102 MMOL/L (ref 96–112)
CO2 SERPL-SCNC: 25 MMOL/L (ref 20–33)
CREAT SERPL-MCNC: 0.59 MG/DL (ref 0.5–1.4)
CRP SERPL HS-MCNC: 0.95 MG/DL (ref 0–0.75)
EOSINOPHIL # BLD AUTO: 0.15 K/UL (ref 0–0.51)
EOSINOPHIL NFR BLD: 2 % (ref 0–6.9)
ERYTHROCYTE [DISTWIDTH] IN BLOOD BY AUTOMATED COUNT: 48.3 FL (ref 35.9–50)
ERYTHROCYTE [SEDIMENTATION RATE] IN BLOOD BY WESTERGREN METHOD: 9 MM/HOUR (ref 0–25)
FASTING STATUS PATIENT QL REPORTED: NORMAL
GFR SERPLBLD CREATININE-BSD FMLA CKD-EPI: 99 ML/MIN/1.73 M 2
GLOBULIN SER CALC-MCNC: 3 G/DL (ref 1.9–3.5)
GLUCOSE SERPL-MCNC: 93 MG/DL (ref 65–99)
HCT VFR BLD AUTO: 46.2 % (ref 37–47)
HGB BLD-MCNC: 15.2 G/DL (ref 12–16)
IMM GRANULOCYTES # BLD AUTO: 0.02 K/UL (ref 0–0.11)
IMM GRANULOCYTES NFR BLD AUTO: 0.3 % (ref 0–0.9)
LYMPHOCYTES # BLD AUTO: 1.46 K/UL (ref 1–4.8)
LYMPHOCYTES NFR BLD: 19.8 % (ref 22–41)
MCH RBC QN AUTO: 30.4 PG (ref 27–33)
MCHC RBC AUTO-ENTMCNC: 32.9 G/DL (ref 33.6–35)
MCV RBC AUTO: 92.4 FL (ref 81.4–97.8)
MONOCYTES # BLD AUTO: 0.79 K/UL (ref 0–0.85)
MONOCYTES NFR BLD AUTO: 10.7 % (ref 0–13.4)
NEUTROPHILS # BLD AUTO: 4.92 K/UL (ref 2–7.15)
NEUTROPHILS NFR BLD: 66.5 % (ref 44–72)
NRBC # BLD AUTO: 0 K/UL
NRBC BLD-RTO: 0 /100 WBC
PLATELET # BLD AUTO: 260 K/UL (ref 164–446)
PMV BLD AUTO: 10.1 FL (ref 9–12.9)
POTASSIUM SERPL-SCNC: 4.5 MMOL/L (ref 3.6–5.5)
PROT SERPL-MCNC: 7.1 G/DL (ref 6–8.2)
RBC # BLD AUTO: 5 M/UL (ref 4.2–5.4)
SODIUM SERPL-SCNC: 138 MMOL/L (ref 135–145)
WBC # BLD AUTO: 7.4 K/UL (ref 4.8–10.8)

## 2022-10-11 PROCEDURE — 36415 COLL VENOUS BLD VENIPUNCTURE: CPT

## 2022-10-11 PROCEDURE — 80053 COMPREHEN METABOLIC PANEL: CPT

## 2022-10-11 PROCEDURE — 85652 RBC SED RATE AUTOMATED: CPT

## 2022-10-11 PROCEDURE — 86140 C-REACTIVE PROTEIN: CPT

## 2022-10-11 PROCEDURE — 85025 COMPLETE CBC W/AUTO DIFF WBC: CPT

## 2022-11-09 ENCOUNTER — PATIENT MESSAGE (OUTPATIENT)
Dept: HEALTH INFORMATION MANAGEMENT | Facility: OTHER | Age: 67
End: 2022-11-09

## 2022-11-23 ENCOUNTER — HOSPITAL ENCOUNTER (OUTPATIENT)
Dept: RADIOLOGY | Facility: MEDICAL CENTER | Age: 67
End: 2022-11-23
Attending: PHYSICIAN ASSISTANT
Payer: MEDICARE

## 2022-11-23 DIAGNOSIS — M54.16 LUMBAR RADICULOPATHY: ICD-10-CM

## 2022-11-23 PROCEDURE — 72148 MRI LUMBAR SPINE W/O DYE: CPT

## 2022-11-26 DIAGNOSIS — B02.8 HERPES ZOSTER WITH COMPLICATION: ICD-10-CM

## 2022-11-26 RX ORDER — ACYCLOVIR 400 MG/1
TABLET ORAL
Qty: 180 TABLET | Refills: 1 | Status: SHIPPED | OUTPATIENT
Start: 2022-11-26 | End: 2023-05-15

## 2022-11-28 ENCOUNTER — HOSPITAL ENCOUNTER (OUTPATIENT)
Dept: LAB | Facility: MEDICAL CENTER | Age: 67
End: 2022-11-28
Attending: NURSE PRACTITIONER
Payer: MEDICARE

## 2022-11-28 DIAGNOSIS — Z79.899 HIGH RISK MEDICATION USE: ICD-10-CM

## 2022-11-28 DIAGNOSIS — Z79.899 LONG-TERM USE OF PLAQUENIL: ICD-10-CM

## 2022-11-28 DIAGNOSIS — M35.00 SJOGREN'S SYNDROME, WITH UNSPECIFIED ORGAN INVOLVEMENT (HCC): ICD-10-CM

## 2022-11-28 DIAGNOSIS — M05.79 RHEUMATOID ARTHRITIS INVOLVING MULTIPLE SITES WITH POSITIVE RHEUMATOID FACTOR (HCC): ICD-10-CM

## 2022-11-28 LAB
ALBUMIN SERPL BCP-MCNC: 4 G/DL (ref 3.2–4.9)
ALBUMIN/GLOB SERPL: 1.4 G/DL
ALP SERPL-CCNC: 73 U/L (ref 30–99)
ALT SERPL-CCNC: 16 U/L (ref 2–50)
ANION GAP SERPL CALC-SCNC: 8 MMOL/L (ref 7–16)
AST SERPL-CCNC: 16 U/L (ref 12–45)
BASOPHILS # BLD AUTO: 0.8 % (ref 0–1.8)
BASOPHILS # BLD: 0.06 K/UL (ref 0–0.12)
BILIRUB SERPL-MCNC: 0.6 MG/DL (ref 0.1–1.5)
BUN SERPL-MCNC: 12 MG/DL (ref 8–22)
CALCIUM SERPL-MCNC: 9.4 MG/DL (ref 8.4–10.2)
CHLORIDE SERPL-SCNC: 104 MMOL/L (ref 96–112)
CO2 SERPL-SCNC: 27 MMOL/L (ref 20–33)
CREAT SERPL-MCNC: 0.69 MG/DL (ref 0.5–1.4)
EOSINOPHIL # BLD AUTO: 0.13 K/UL (ref 0–0.51)
EOSINOPHIL NFR BLD: 1.7 % (ref 0–6.9)
ERYTHROCYTE [DISTWIDTH] IN BLOOD BY AUTOMATED COUNT: 47.7 FL (ref 35.9–50)
FASTING STATUS PATIENT QL REPORTED: NORMAL
GFR SERPLBLD CREATININE-BSD FMLA CKD-EPI: 95 ML/MIN/1.73 M 2
GLOBULIN SER CALC-MCNC: 2.9 G/DL (ref 1.9–3.5)
GLUCOSE SERPL-MCNC: 96 MG/DL (ref 65–99)
HCT VFR BLD AUTO: 45.9 % (ref 37–47)
HGB BLD-MCNC: 15 G/DL (ref 12–16)
IMM GRANULOCYTES # BLD AUTO: 0.03 K/UL (ref 0–0.11)
IMM GRANULOCYTES NFR BLD AUTO: 0.4 % (ref 0–0.9)
LYMPHOCYTES # BLD AUTO: 1.56 K/UL (ref 1–4.8)
LYMPHOCYTES NFR BLD: 20.3 % (ref 22–41)
MCH RBC QN AUTO: 30.2 PG (ref 27–33)
MCHC RBC AUTO-ENTMCNC: 32.7 G/DL (ref 33.6–35)
MCV RBC AUTO: 92.5 FL (ref 81.4–97.8)
MONOCYTES # BLD AUTO: 0.47 K/UL (ref 0–0.85)
MONOCYTES NFR BLD AUTO: 6.1 % (ref 0–13.4)
NEUTROPHILS # BLD AUTO: 5.43 K/UL (ref 2–7.15)
NEUTROPHILS NFR BLD: 70.7 % (ref 44–72)
NRBC # BLD AUTO: 0 K/UL
NRBC BLD-RTO: 0 /100 WBC
PLATELET # BLD AUTO: 256 K/UL (ref 164–446)
PMV BLD AUTO: 9.3 FL (ref 9–12.9)
POTASSIUM SERPL-SCNC: 4.5 MMOL/L (ref 3.6–5.5)
PROT SERPL-MCNC: 6.9 G/DL (ref 6–8.2)
RBC # BLD AUTO: 4.96 M/UL (ref 4.2–5.4)
SODIUM SERPL-SCNC: 139 MMOL/L (ref 135–145)
WBC # BLD AUTO: 7.7 K/UL (ref 4.8–10.8)

## 2022-11-28 PROCEDURE — 36415 COLL VENOUS BLD VENIPUNCTURE: CPT

## 2022-11-28 PROCEDURE — 80053 COMPREHEN METABOLIC PANEL: CPT

## 2022-11-28 PROCEDURE — 85025 COMPLETE CBC W/AUTO DIFF WBC: CPT

## 2023-01-10 ENCOUNTER — HOSPITAL ENCOUNTER (OUTPATIENT)
Dept: LAB | Facility: MEDICAL CENTER | Age: 68
End: 2023-01-10
Attending: NURSE PRACTITIONER
Payer: MEDICARE

## 2023-01-10 DIAGNOSIS — R94.6 ABNORMAL THYROID FUNCTION TEST: ICD-10-CM

## 2023-01-10 DIAGNOSIS — Z79.899 LONG-TERM USE OF PLAQUENIL: ICD-10-CM

## 2023-01-10 DIAGNOSIS — Z79.899 HIGH RISK MEDICATION USE: ICD-10-CM

## 2023-01-10 DIAGNOSIS — M35.00 SJOGREN'S SYNDROME, WITH UNSPECIFIED ORGAN INVOLVEMENT (HCC): ICD-10-CM

## 2023-01-10 DIAGNOSIS — E03.9 ACQUIRED HYPOTHYROIDISM: ICD-10-CM

## 2023-01-10 DIAGNOSIS — I10 ESSENTIAL HYPERTENSION: ICD-10-CM

## 2023-01-10 DIAGNOSIS — M05.79 RHEUMATOID ARTHRITIS INVOLVING MULTIPLE SITES WITH POSITIVE RHEUMATOID FACTOR (HCC): ICD-10-CM

## 2023-01-10 DIAGNOSIS — E55.9 VITAMIN D DEFICIENCY: ICD-10-CM

## 2023-01-10 DIAGNOSIS — E78.5 HYPERLIPIDEMIA LDL GOAL <100: ICD-10-CM

## 2023-01-10 LAB
ALBUMIN SERPL BCP-MCNC: 4 G/DL (ref 3.2–4.9)
ALBUMIN/GLOB SERPL: 1.3 G/DL
ALP SERPL-CCNC: 82 U/L (ref 30–99)
ALT SERPL-CCNC: 27 U/L (ref 2–50)
ANION GAP SERPL CALC-SCNC: 9 MMOL/L (ref 7–16)
AST SERPL-CCNC: 22 U/L (ref 12–45)
BASOPHILS # BLD AUTO: 0.9 % (ref 0–1.8)
BASOPHILS # BLD: 0.04 K/UL (ref 0–0.12)
BILIRUB SERPL-MCNC: 0.4 MG/DL (ref 0.1–1.5)
BUN SERPL-MCNC: 11 MG/DL (ref 8–22)
CALCIUM ALBUM COR SERPL-MCNC: 9.4 MG/DL (ref 8.5–10.5)
CALCIUM SERPL-MCNC: 9.4 MG/DL (ref 8.4–10.2)
CHLORIDE SERPL-SCNC: 106 MMOL/L (ref 96–112)
CHOLEST SERPL-MCNC: 164 MG/DL (ref 100–199)
CO2 SERPL-SCNC: 26 MMOL/L (ref 20–33)
CREAT SERPL-MCNC: 0.7 MG/DL (ref 0.5–1.4)
CREAT UR-MCNC: 132.1 MG/DL
CRP SERPL HS-MCNC: 3.2 MG/DL (ref 0–0.75)
EOSINOPHIL # BLD AUTO: 0.12 K/UL (ref 0–0.51)
EOSINOPHIL NFR BLD: 2.6 % (ref 0–6.9)
ERYTHROCYTE [DISTWIDTH] IN BLOOD BY AUTOMATED COUNT: 49.5 FL (ref 35.9–50)
ERYTHROCYTE [SEDIMENTATION RATE] IN BLOOD BY WESTERGREN METHOD: 30 MM/HOUR (ref 0–25)
FASTING STATUS PATIENT QL REPORTED: NORMAL
FASTING STATUS PATIENT QL REPORTED: NORMAL
GFR SERPLBLD CREATININE-BSD FMLA CKD-EPI: 95 ML/MIN/1.73 M 2
GLOBULIN SER CALC-MCNC: 3 G/DL (ref 1.9–3.5)
GLUCOSE SERPL-MCNC: 99 MG/DL (ref 65–99)
HCT VFR BLD AUTO: 44.2 % (ref 37–47)
HDLC SERPL-MCNC: 49 MG/DL
HGB BLD-MCNC: 14.8 G/DL (ref 12–16)
IMM GRANULOCYTES # BLD AUTO: 0.02 K/UL (ref 0–0.11)
IMM GRANULOCYTES NFR BLD AUTO: 0.4 % (ref 0–0.9)
LDLC SERPL CALC-MCNC: 98 MG/DL
LYMPHOCYTES # BLD AUTO: 1.09 K/UL (ref 1–4.8)
LYMPHOCYTES NFR BLD: 23.2 % (ref 22–41)
MCH RBC QN AUTO: 31 PG (ref 27–33)
MCHC RBC AUTO-ENTMCNC: 33.5 G/DL (ref 33.6–35)
MCV RBC AUTO: 92.5 FL (ref 81.4–97.8)
MICROALBUMIN UR-MCNC: <1.2 MG/DL
MICROALBUMIN/CREAT UR: NORMAL MG/G (ref 0–30)
MONOCYTES # BLD AUTO: 0.39 K/UL (ref 0–0.85)
MONOCYTES NFR BLD AUTO: 8.3 % (ref 0–13.4)
NEUTROPHILS # BLD AUTO: 3.04 K/UL (ref 2–7.15)
NEUTROPHILS NFR BLD: 64.6 % (ref 44–72)
NRBC # BLD AUTO: 0 K/UL
NRBC BLD-RTO: 0 /100 WBC
PLATELET # BLD AUTO: 304 K/UL (ref 164–446)
PMV BLD AUTO: 9.5 FL (ref 9–12.9)
POTASSIUM SERPL-SCNC: 4.6 MMOL/L (ref 3.6–5.5)
PROT SERPL-MCNC: 7 G/DL (ref 6–8.2)
RBC # BLD AUTO: 4.78 M/UL (ref 4.2–5.4)
SODIUM SERPL-SCNC: 141 MMOL/L (ref 135–145)
T4 FREE SERPL-MCNC: 1.54 NG/DL (ref 0.93–1.7)
TRIGL SERPL-MCNC: 86 MG/DL (ref 0–149)
TSH SERPL DL<=0.005 MIU/L-ACNC: 5.38 UIU/ML (ref 0.38–5.33)
WBC # BLD AUTO: 4.7 K/UL (ref 4.8–10.8)

## 2023-01-10 PROCEDURE — 80061 LIPID PANEL: CPT

## 2023-01-10 PROCEDURE — 36415 COLL VENOUS BLD VENIPUNCTURE: CPT

## 2023-01-10 PROCEDURE — 84443 ASSAY THYROID STIM HORMONE: CPT

## 2023-01-10 PROCEDURE — 80053 COMPREHEN METABOLIC PANEL: CPT

## 2023-01-10 PROCEDURE — 84481 FREE ASSAY (FT-3): CPT

## 2023-01-10 PROCEDURE — 86140 C-REACTIVE PROTEIN: CPT

## 2023-01-10 PROCEDURE — 85025 COMPLETE CBC W/AUTO DIFF WBC: CPT

## 2023-01-10 PROCEDURE — 85652 RBC SED RATE AUTOMATED: CPT

## 2023-01-10 PROCEDURE — 82306 VITAMIN D 25 HYDROXY: CPT

## 2023-01-10 PROCEDURE — 82043 UR ALBUMIN QUANTITATIVE: CPT

## 2023-01-10 PROCEDURE — 82570 ASSAY OF URINE CREATININE: CPT

## 2023-01-10 PROCEDURE — 84439 ASSAY OF FREE THYROXINE: CPT

## 2023-01-11 LAB
25(OH)D3 SERPL-MCNC: 51 NG/ML (ref 30–100)
T3FREE SERPL-MCNC: 2.03 PG/ML (ref 2–4.4)

## 2023-01-17 ENCOUNTER — OFFICE VISIT (OUTPATIENT)
Dept: MEDICAL GROUP | Facility: MEDICAL CENTER | Age: 68
End: 2023-01-17
Payer: MEDICARE

## 2023-01-17 VITALS
OXYGEN SATURATION: 95 % | RESPIRATION RATE: 16 BRPM | HEART RATE: 58 BPM | WEIGHT: 220 LBS | SYSTOLIC BLOOD PRESSURE: 120 MMHG | DIASTOLIC BLOOD PRESSURE: 86 MMHG | BODY MASS INDEX: 36.65 KG/M2 | HEIGHT: 65 IN | TEMPERATURE: 97.6 F

## 2023-01-17 DIAGNOSIS — I21.4 NSTEMI (NON-ST ELEVATED MYOCARDIAL INFARCTION) (HCC): ICD-10-CM

## 2023-01-17 DIAGNOSIS — R10.84 GENERALIZED ABDOMINAL PAIN: ICD-10-CM

## 2023-01-17 DIAGNOSIS — Z91.09 ALLERGY TO POLLEN: ICD-10-CM

## 2023-01-17 DIAGNOSIS — K52.838 OTHER MICROSCOPIC COLITIS: ICD-10-CM

## 2023-01-17 DIAGNOSIS — R10.32 ACUTE BILATERAL LOWER ABDOMINAL PAIN: ICD-10-CM

## 2023-01-17 DIAGNOSIS — I10 PRIMARY HYPERTENSION: Chronic | ICD-10-CM

## 2023-01-17 DIAGNOSIS — M05.79 RHEUMATOID ARTHRITIS INVOLVING MULTIPLE SITES WITH POSITIVE RHEUMATOID FACTOR (HCC): ICD-10-CM

## 2023-01-17 DIAGNOSIS — E03.9 ACQUIRED HYPOTHYROIDISM: ICD-10-CM

## 2023-01-17 DIAGNOSIS — D12.6 ADENOMATOUS POLYP OF COLON, UNSPECIFIED PART OF COLON: Chronic | ICD-10-CM

## 2023-01-17 DIAGNOSIS — E66.9 OBESITY (BMI 30-39.9): ICD-10-CM

## 2023-01-17 DIAGNOSIS — R10.30 LOWER ABDOMINAL PAIN: ICD-10-CM

## 2023-01-17 DIAGNOSIS — R10.31 ACUTE BILATERAL LOWER ABDOMINAL PAIN: ICD-10-CM

## 2023-01-17 DIAGNOSIS — E78.5 HYPERLIPIDEMIA LDL GOAL <70: ICD-10-CM

## 2023-01-17 DIAGNOSIS — R94.6 ABNORMAL THYROID FUNCTION TEST: ICD-10-CM

## 2023-01-17 DIAGNOSIS — G56.00 CARPAL TUNNEL SYNDROME, UNSPECIFIED LATERALITY: Chronic | ICD-10-CM

## 2023-01-17 DIAGNOSIS — R73.01 IMPAIRED FASTING GLUCOSE: ICD-10-CM

## 2023-01-17 DIAGNOSIS — R73.01 IFG (IMPAIRED FASTING GLUCOSE): ICD-10-CM

## 2023-01-17 PROCEDURE — G0439 PPPS, SUBSEQ VISIT: HCPCS | Performed by: NURSE PRACTITIONER

## 2023-01-17 ASSESSMENT — PATIENT HEALTH QUESTIONNAIRE - PHQ9: CLINICAL INTERPRETATION OF PHQ2 SCORE: 0

## 2023-01-17 ASSESSMENT — ENCOUNTER SYMPTOMS: GENERAL WELL-BEING: FAIR

## 2023-01-17 ASSESSMENT — FIBROSIS 4 INDEX: FIB4 SCORE: 0.92

## 2023-01-17 ASSESSMENT — ACTIVITIES OF DAILY LIVING (ADL): BATHING_REQUIRES_ASSISTANCE: 0

## 2023-01-17 NOTE — ASSESSMENT & PLAN NOTE
Found to have this several years ago.  She is having more abd pain with the increase in methotrexate.  She has not seen GI recently.  She is having worsening lower abd pain.  It is in norma abd lower quadrants.  No recent diarrhea.  Only + gas pain

## 2023-01-17 NOTE — ASSESSMENT & PLAN NOTE
DBP very mildly elevated today.  Taking med approp.  Cardiac was recommending increasing her bp but they decided not to increase after all since she runs low bp sometimes.

## 2023-01-17 NOTE — ASSESSMENT & PLAN NOTE
She is on crestor 5 mg qod.  She has some myalgias with the med.  She uses occas tyl for the pain.

## 2023-01-17 NOTE — ASSESSMENT & PLAN NOTE
She had a small NSTEMI 8/22 (troponin peak 1.33) with small <1mm diameter occluded diagonal branch, managed medically.  She is followed by DR Lui at [facility].  No chest pain now.  She has SOB and fatigue.  No tx done for the small dx artery seen on cath.  She is on plavix and asa.

## 2023-01-17 NOTE — PROGRESS NOTES
Chief Complaint   Patient presents with    Annual Exam       HPI:  Elizabeth Nuñez is a 67 y.o. here for Medicare Annual Wellness Visit   She is not feeling well.  She is on methotrexate for her RA.  Followed by Dr Degroot.  The med is causing abd pain, nausea and lots of gas pain.  No black tarry stool.  She was increased 6 wks ago.  She was initially started on med in october.  The 6 tabs is causing her pain.  She hasn't noted change in the left hand pain randomly.  She was starteed on med tx after her MI in august.  She is not always on healthy diet.  She hasn't been exercising much d/t her recent pain from her meds.  She is still working 2 nites a week.    Reviewed lab with pt.  CMP, GFR, vitamin d, alb/cr ratio, T4, T3 is wnl  TSH mildly elevated at 5.38.  taking levothyroxine approp.  LP shows trg are at goal.  LDL is high at 98.  This on crestor qod.  HDL is ok but down from 65 to 49.    She is followed by rheumatology for her RA. Taking meds approp but not feeling well on meds.   BP is stable and well controlled. Taking meds approp. Stable on meds with taking med approp.      NSTEMI (non-ST elevated myocardial infarction) (HCC)  She had a small NSTEMI 8/22 (troponin peak 1.33) with small <1mm diameter occluded diagonal branch, managed medically.  She is followed by DR Lui at [facility].  No chest pain now.  She has SOB and fatigue.  No tx done for the small dx artery seen on cath.  She is on plavix and asa.      Adenomatous polyp of colon  Last colonoscopy in 2017.  Will be due repeat in 2027    Hypertension  DBP very mildly elevated today.  Taking med approp.  Cardiac was recommending increasing her bp but they decided not to increase after all since she runs low bp sometimes.      Hyperlipidemia LDL goal <70  She is on crestor 5 mg qod.  She has some myalgias with the med.  She uses occas tyl for the pain.     Other microscopic colitis  Found to have this several years ago.  She is having more abd  Left msg for pt to give the office a call back     pain with the increase in methotrexate.  She has not seen GI recently.  She is having worsening lower abd pain.  It is in norma abd lower quadrants.  No recent diarrhea.  Only + gas pain  Dictation #1  MRN:[id number]  CSN:[id number]     Patient Active Problem List    Diagnosis Date Noted    NSTEMI (non-ST elevated myocardial infarction) ([facility]) 01/17/2023    Other microscopic colitis 01/17/2023    Sjogren's syndrome (HCC)     Rheumatoid arthritis (HCC)     IFG (impaired fasting glucose) 01/05/2019    Obesity (BMI 30-39.9) 07/13/2017    Allergy to pollen     Hypertension 02/22/2012    Syncope 05/20/2010    Hypothyroid 04/22/2009    Pulmonary nodule 04/22/2009    Hyperlipidemia LDL goal <70 04/22/2009    Pruritic dermatitis 04/22/2009    History of diverticulitis of colon 04/22/2009    Carpal tunnel syndrome 04/22/2009    Adenomatous polyp of colon 04/22/2009    GERD (gastroesophageal reflux disease) 04/22/2009       Current Outpatient Medications   Medication Sig Dispense Refill    lisinopril (PRINIVIL) 10 MG Tab Take 1 Tablet by mouth 2 times a day. TAKE 1 TABLET BY MOUTH TWICE A  Tablet 0    levothyroxine (SYNTHROID) 137 MCG Tab TAKE ONE TABLET BY MOUTH EVERY MORNING ON AN EMPTY STOMACH 90 Tablet 0    methotrexate 2.5 MG Tab Take 6 Tablets by mouth every 7 days. 60 Tablet 0    acyclovir (ZOVIRAX) 400 MG tablet TAKE ONE TABLET BY MOUTH TWICE A  Tablet 1    Magnesium 500 MG Cap       aspirin 81 MG EC tablet Take 81 mg by mouth every day.      folic acid (FOLVITE) 1 MG Tab Take 1 Tablet by mouth every day. 90 Tablet 3    rosuvastatin (CRESTOR) 5 MG Tab Take 1 Tablet by mouth every 48 hours. 15 Tablet 0    hydroxychloroquine (PLAQUENIL) 200 MG Tab TAKE ONE TABLET BY MOUTH TWICE A  Tablet 2    clopidogrel (PLAVIX) 75 MG Tab Take 75 mg by mouth every day.      ketoconazole (NIZORAL) 2 % Cream Apply 1 Application topically 2 times a day. 30 g 1    albuterol 108 (90 Base) MCG/ACT Aero Soln inhalation aerosol  Inhale 2 Puffs every 6 hours as needed for Shortness of Breath. 8.5 g 1    Calcium Carb-Cholecalciferol (CALCIUM 500 +D PO) Take 3 Tabs by mouth every evening.      B Complex Cap Take 1 Cap by mouth every day. 30 Cap 0    OMEPRAZOLE 20 MG PO CPDR Take 20 mg by mouth as needed (For heartburn).  11     No current facility-administered medications for this visit.          Current supplements as per medication list.     Allergies: Misc. sulfonamide containing compounds, Morphine, Sulfa drugs, and Quinine    Current social contact/activities:    [place]     She  reports that she has never smoked. She has never used smokeless tobacco. She reports current alcohol use. She reports that she does not use drugs.  Counseling given: Not Answered      ROS:    Gait: Uses no assistive device  Ostomy: No  Other tubes: No  Amputations: No  Chronic oxygen use: No  Last eye exam: 2 weeks ago  Wears hearing aids: No   : Reports urinary leakage during the last 6 months that has not interfered at all with their daily activities or sleep.    Screening:    Depression Screening  Little interest or pleasure in doing things?  0 - not at all  Feeling down, depressed , or hopeless? 0 - not at all  Patient Health Questionnaire Score: 0     If depressive symptoms identified deferred to follow up visit unless specifically addressed in assessment and plan.    Interpretation of PHQ-9 Total Score   Score Severity   1-4 No Depression   5-9 Mild Depression   10-14 Moderate Depression   15-19 Moderately Severe Depression   20-27 Severe Depression    Screening for Cognitive Impairment  Three Minute Recall (daughter, heaven, mountain) 2/3    [place] clock face with all 12 numbers and set the hands to show 10 past 11.  Yes    Cognitive concerns identified deferred for follow up unless specifically addressed in assessment and plan.    Fall Risk Assessment  Has the patient had two or more falls in the last year or any fall with injury in the last year?   No    Safety Assessment  Throw rugs on floor.  No  Handrails on all stairs.  No  Good lighting in all hallways.  Yes  Difficulty hearing.  No  Patient counseled about all safety risks that were identified.    Functional Assessment ADLs  Are there any barriers preventing you from cooking for yourself or meeting nutritional needs?  No.    Are there any barriers preventing you from driving safely or obtaining transportation?  No.    Are there any barriers preventing you from using a telephone or calling for help?  No.    Are there any barriers preventing you from shopping?  No.    Are there any barriers preventing you from taking care of your own finances?  No.    Are there any barriers preventing you from managing your medications?  No.    Are there any barriers preventing you from showering, bathing or dressing yourself?  No.    Are you currently engaging in any exercise or physical activity?  Yes.     What is your perception of your health?  Fair    Advance Care Planning  Do you have an Advance Directive, Living Will, Durable Power of , or POLST? No                 Health Maintenance Summary            Overdue - COVID-19 Vaccine (4 - Booster for Moderna series) Overdue since 1/14/2022 11/19/2021  Imm Admin: MODERNA SARS-COV-2 VACCINE (12+)    01/27/2021  Imm Admin: MODERNA SARS-COV-2 VACCINE (12+)    12/30/2020  Imm Admin: MODERNA SARS-COV-2 VACCINE (12+)              Annual Wellness Visit (Every 366 Days) Next due on 1/18/2024 01/17/2023  Done              MAMMOGRAM (Every 2 Years) Tentatively due on 6/17/2024 06/17/2022  MA-SCREENING MAMMO BILAT W/TOMOSYNTHESIS W/CAD    06/16/2021  MA-SCREENING MAMMO BILAT W/TOMOSYNTHESIS W/CAD    06/13/2020  MA-SCREENING MAMMO BILAT W/TOMOSYNTHESIS W/CAD    01/08/2019  MA-SCREEN MAMMO W/CAD-BILAT    11/10/2017  MA-SCREEN MAMMO W/CAD-BILAT    Only the first 5 history entries have been loaded, but more history exists.              BONE DENSITY (Every 2  Years) Next due on 7/13/2024 07/13/2022  DS-BONE DENSITY STUDY (DEXA)    07/10/2020  DS-BONE DENSITY STUDY (DEXA)    11/01/2013  DS-BONE DENSITY STUDY (DEXA)    10/20/2011  DS-BONE DENSITY STUDY (DEXA)    04/20/2009  DS-BONE DENSITY STUDY (DEXA)              COLORECTAL CANCER SCREENING (COLONOSCOPY - Every 10 Years) Next due on 3/15/2027      03/15/2017  COLONOSCOPY (Done)    02/28/2012  COLONOSCOPY (Done)              IMM DTaP/Tdap/Td Vaccine (3 - Td or Tdap) Next due on 6/21/2027 06/21/2017  Imm Admin: Tdap Vaccine    07/13/2009  Imm Admin: Tdap Vaccine              IMM ZOSTER VACCINES (Series Information) Completed      02/05/2019  Imm Admin: Zoster Vaccine Recombinant (RZV) (SHINGRIX)    09/06/2018  Imm Admin: Zoster Vaccine Recombinant (RZV) (SHINGRIX)              HEPATITIS C SCREENING  Completed      06/02/2020  HEP C VIRUS ANTIBODY              IMM PNEUMOCOCCAL VACCINE: 65+ Years (Series Information) Completed      05/03/2022  Imm Admin: Pneumococcal polysaccharide vaccine (PPSV-23)    09/09/2021  Imm Admin: Pneumococcal Conjugate Vaccine (Prevnar/PCV-13)              IMM INFLUENZA (Series Information) Completed      09/16/2022  Outside Immunization: Fluzone High-Dose Quad    09/27/2021  Imm Admin: Influenza Vaccine Adult HD    09/08/2020  Imm Admin: Influenza Vaccine Adult HD    09/02/2020  Imm Admin: Influenza Vaccine Quad Inj (Pf)    09/03/2019  Imm Admin: Influenza Vaccine Quad Inj (Pf)    Only the first 5 history entries have been loaded, but more history exists.              IMM HEP B VACCINE (Series Information) Aged Out      No completion history exists for this topic.              IMM MENINGOCOCCAL ACWY VACCINE (Series Information) Aged Out      No completion history exists for this topic.              Discontinued - CERVICAL CANCER SCREENING  Discontinued        Frequency changed to Never automatically (Topic No Longer Applies)    10/23/2013  PAP IG (IMAGE GUIDED)               "      Patient Care Team:  BASILIO Carrera as PCP - General (Family Medicine)        Social History     Tobacco Use    Smoking status: Never    Smokeless tobacco: Never   Vaping Use    Vaping Use: Never used   Substance Use Topics    Alcohol use: Yes     Alcohol/week: 0.0 oz    Drug use: No     Family History   Problem Relation Age of Onset    Cancer Mother         ovarian, age 52    Cancer Maternal Aunt         breast, dx age 33,  age 62    Breast Cancer Maternal Aunt     Cancer Maternal Aunt         ovarian cancer    Cancer Other          cousin, breast cancer age 40    Cancer Paternal Grandfather         stomach    Cancer Paternal Uncle         stomach     She  has a past medical history of Adenomatous polyp of colon (2009), Allergic rhinitis, Carpal tunnel syndrome (2009), Diverticulitis (2009), Dyslipidemia (2009), GERD (gastroesophageal reflux disease) (2009), Hyperlipidemia LDL goal <70 (2009), Hypertension, Hypothyroidism, Impaired fasting glucose (2009), NSTEMI (non-ST elevated myocardial infarction) ([facility]) (2023), Obesity (BMI 30-39.9) (2017), Other microscopic colitis (2023), Pruritic dermatitis (2009), Pulmonary nodule (2009), Rheumatoid arthritis ([facility]), Sjogren's syndrome ([facility]), and Syncope.   Past Surgical History:   Procedure Laterality Date    HYSTERECTOMY ROBOTIC  2009    Performed by ARLEN ABDI at [facility].   due to uterine fibroids and ovarian cyst    ARTHROSCOPY, KNEE      left knee repair    METATARSAL HEAD RESECTION      metatarsal arthroplasty     PRIMARY C SECTION      x2    UMBILICAL HERNIA REPAIR         Exam:   /86   Pulse (!) 58   Temp 36.4 °C (97.6 °F) (Temporal)   Resp 16   Ht 1.651 m (5' 5\")   Wt 99.8 kg (220 lb)   SpO2 95%  Body mass index is 36.61 kg/m².    Hearing excellent.    Dentition good  Alert, oriented in no acute distress.  Eye contact is good, speech goal " directed, affect calm  Physical Exam   Vitals reviewed.  Constitutional: oriented to person, place, and time. appears well-developed and well-nourished. No distress.   HENT: Head: Normocephalic and atraumatic. Bilateral tympanic membranes wnl w/o bulging.  Right Ear: External ear normal. Left Ear: External ear normal. Nose: Nose normal.  Mouth/Throat: Oropharynx is clear and moist. No oropharyngeal exudate. norma tm wnl. Eyes: Conjunctivae and EOM are normal. Pupils are equal, round, and reactive to light. Right eye exhibits no discharge. Left eye exhibits no discharge. No scleral icterus.    Neck: Normal range of motion. Neck supple. No JVD present.   Cardiovascular: Normal rate, regular rhythm, normal heart sounds and intact distal pulses.  Exam reveals no gallop and no friction rub.  No murmur heard.  No carotid bruits   Pulmonary/Chest: Effort normal and breath sounds normal. No stridor. No respiratory distress. no wheezes or rales. exhibits no tenderness.   Abdominal: Soft. Bowel sounds are normal. exhibits no distension and no mass. LUQ, norma LOWER quadrant tenderness. no rebound and no guarding.   Musculoskeletal: Normal range of motion. exhibits no edema or tenderness.  norma pedal pulses 2+.  Lymphadenopathy:  no cervical or supraclavicular adenopathy.   Neurological: alert and oriented to person, place, and time. has normal reflexes. displays normal reflexes. No cranial nerve deficit. exhibits normal muscle tone. Coordination normal.   Skin: Skin is warm and dry. No rash noted. no diaphoresis. No erythema. No pallor.   Psychiatric: normal mood and affect. behavior is normal.       Assessment and Plan. The following treatment and monitoring plan is recommended:  Services suggested: No services needed at this time  1. Primary hypertension      stable on med. plan rechek lab w/CMP, LP.  f/u for review. bp improved. f/u 6 mo for bp chk & lab review      2. Hyperlipidemia LDL goal <70  Comp Metabolic Panel    Lipid  Profile    taking med appro.  LP not all at goal.  continue healthy diet and regular exercise.  f/u for TSH, T4, A1C LIPITIRO.       3. Adenomatous polyp of colon, unspecified part of colon      no current sx.  she will do ct abd asap. f/u w/ot w/results.  consider recheck in spring/summer      4. IFG (impaired fasting glucose)  Comp Metabolic Panel    HEMOGLOBIN A1C    stable w/o med for DM at this. enc pt to improve healthy diet and result exerics.       5. Rheumatoid arthritis involving multiple sites with positive rheumatoid factor (HCC)      stable but not feeling well on meds. f/u with rheumatogy for med review and update.      6. Other microscopic colitis      STABle at this time.       7. Lower abdominal pain  CT-ABDOMEN-PELVIS WITH    multiple levels of pain. do ct abd to f/u for hx of diverticultis      8. Acute bilateral lower abdominal pain  CT-ABDOMEN-PELVIS WITH    do ct abd to check etiologycy sx for pain control. f/u w/pt wi/result and appt tx      9. Generalized abdominal pain  CT-ABDOMEN-PELVIS WITH    do ct abd.  w/u w/pt w/result. has hx of diverticulitis. no antibx recommended at this time.       10. Abnormal thyroid function test  TSH    FREE THYROXINE    taking levothyroxine appro.  TSH MILDLDY up from previous. will continue to monitor.f/u w/pt w/results.      11. Acquired hypothyroidism  TSH    FREE THYROXINE    taking med approp.  do lab 6 mo. f/u for review      12. NSTEMI (non-ST elevated myocardial infarction) (HCC)      recent MI.  continue f/u with cardiac      13. Allergy to pollen        14. Carpal tunnel syndrome, unspecified laterality        15. Impaired fasting glucose        16. Obesity (BMI 30-39.9)                Health Care Screening: Age-appropriate preventive services recommended by USPTF and ACIP covered by Medicare were discussed today. Services ordered if indicated and agreed upon by the patient.  Referrals offered: Community-based lifestyle interventions to reduce  health risks and promote self-management and wellness, fall prevention, nutrition, physical activity, tobacco-use cessation, weight loss, and mental health services as per orders if indicated.    Discussion today about general wellness and lifestyle habits:    Prevent falls and reduce trip hazards; Cautioned about securing or removing rugs.  Have a working fire alarm and carbon monoxide detector;   Engage in regular physical activity and social activities     Follow-up: Return in about 6 months (around 7/17/2023).

## 2023-01-18 ENCOUNTER — HOSPITAL ENCOUNTER (OUTPATIENT)
Dept: RADIOLOGY | Facility: MEDICAL CENTER | Age: 68
End: 2023-01-18
Attending: NURSE PRACTITIONER
Payer: MEDICARE

## 2023-01-18 DIAGNOSIS — R10.32 ACUTE BILATERAL LOWER ABDOMINAL PAIN: ICD-10-CM

## 2023-01-18 DIAGNOSIS — R10.84 GENERALIZED ABDOMINAL PAIN: ICD-10-CM

## 2023-01-18 DIAGNOSIS — R10.30 LOWER ABDOMINAL PAIN: ICD-10-CM

## 2023-01-18 DIAGNOSIS — R10.31 ACUTE BILATERAL LOWER ABDOMINAL PAIN: ICD-10-CM

## 2023-01-18 PROCEDURE — 700117 HCHG RX CONTRAST REV CODE 255: Performed by: NURSE PRACTITIONER

## 2023-01-18 PROCEDURE — 74177 CT ABD & PELVIS W/CONTRAST: CPT

## 2023-01-18 RX ADMIN — IOHEXOL 20 ML: 240 INJECTION, SOLUTION INTRATHECAL; INTRAVASCULAR; INTRAVENOUS; ORAL at 11:19

## 2023-01-18 RX ADMIN — IOHEXOL 100 ML: 350 INJECTION, SOLUTION INTRAVENOUS at 11:19

## 2023-01-21 ENCOUNTER — APPOINTMENT (OUTPATIENT)
Dept: RADIOLOGY | Facility: MEDICAL CENTER | Age: 68
End: 2023-01-21
Attending: EMERGENCY MEDICINE
Payer: MEDICARE

## 2023-01-21 ENCOUNTER — HOSPITAL ENCOUNTER (EMERGENCY)
Facility: MEDICAL CENTER | Age: 68
End: 2023-01-21
Attending: EMERGENCY MEDICINE
Payer: MEDICARE

## 2023-01-21 VITALS
HEART RATE: 67 BPM | TEMPERATURE: 98.1 F | WEIGHT: 218.26 LBS | OXYGEN SATURATION: 98 % | RESPIRATION RATE: 18 BRPM | BODY MASS INDEX: 36.36 KG/M2 | DIASTOLIC BLOOD PRESSURE: 74 MMHG | SYSTOLIC BLOOD PRESSURE: 138 MMHG | HEIGHT: 65 IN

## 2023-01-21 DIAGNOSIS — K57.92 DIVERTICULITIS: ICD-10-CM

## 2023-01-21 LAB
ALBUMIN SERPL BCP-MCNC: 3.6 G/DL (ref 3.2–4.9)
ALBUMIN/GLOB SERPL: 1.1 G/DL
ALP SERPL-CCNC: 99 U/L (ref 30–99)
ALT SERPL-CCNC: 32 U/L (ref 2–50)
ANION GAP SERPL CALC-SCNC: 12 MMOL/L (ref 7–16)
AST SERPL-CCNC: 27 U/L (ref 12–45)
BASOPHILS # BLD AUTO: 0.3 % (ref 0–1.8)
BASOPHILS # BLD: 0.04 K/UL (ref 0–0.12)
BILIRUB SERPL-MCNC: 0.9 MG/DL (ref 0.1–1.5)
BUN SERPL-MCNC: 9 MG/DL (ref 8–22)
CALCIUM ALBUM COR SERPL-MCNC: 9.6 MG/DL (ref 8.5–10.5)
CALCIUM SERPL-MCNC: 9.3 MG/DL (ref 8.4–10.2)
CHLORIDE SERPL-SCNC: 101 MMOL/L (ref 96–112)
CO2 SERPL-SCNC: 25 MMOL/L (ref 20–33)
CREAT SERPL-MCNC: 0.74 MG/DL (ref 0.5–1.4)
EOSINOPHIL # BLD AUTO: 0.02 K/UL (ref 0–0.51)
EOSINOPHIL NFR BLD: 0.1 % (ref 0–6.9)
ERYTHROCYTE [DISTWIDTH] IN BLOOD BY AUTOMATED COUNT: 49.1 FL (ref 35.9–50)
GFR SERPLBLD CREATININE-BSD FMLA CKD-EPI: 89 ML/MIN/1.73 M 2
GLOBULIN SER CALC-MCNC: 3.4 G/DL (ref 1.9–3.5)
GLUCOSE SERPL-MCNC: 87 MG/DL (ref 65–99)
HCT VFR BLD AUTO: 41.6 % (ref 37–47)
HGB BLD-MCNC: 13.8 G/DL (ref 12–16)
IMM GRANULOCYTES # BLD AUTO: 0.06 K/UL (ref 0–0.11)
IMM GRANULOCYTES NFR BLD AUTO: 0.4 % (ref 0–0.9)
LYMPHOCYTES # BLD AUTO: 1.56 K/UL (ref 1–4.8)
LYMPHOCYTES NFR BLD: 10.8 % (ref 22–41)
MCH RBC QN AUTO: 30.7 PG (ref 27–33)
MCHC RBC AUTO-ENTMCNC: 33.2 G/DL (ref 33.6–35)
MCV RBC AUTO: 92.4 FL (ref 81.4–97.8)
MONOCYTES # BLD AUTO: 1.32 K/UL (ref 0–0.85)
MONOCYTES NFR BLD AUTO: 9.1 % (ref 0–13.4)
NEUTROPHILS # BLD AUTO: 11.43 K/UL (ref 2–7.15)
NEUTROPHILS NFR BLD: 79.3 % (ref 44–72)
NRBC # BLD AUTO: 0 K/UL
NRBC BLD-RTO: 0 /100 WBC
PLATELET # BLD AUTO: 300 K/UL (ref 164–446)
PMV BLD AUTO: 9.5 FL (ref 9–12.9)
POTASSIUM SERPL-SCNC: 5 MMOL/L (ref 3.6–5.5)
PROT SERPL-MCNC: 7 G/DL (ref 6–8.2)
RBC # BLD AUTO: 4.5 M/UL (ref 4.2–5.4)
SODIUM SERPL-SCNC: 138 MMOL/L (ref 135–145)
WBC # BLD AUTO: 14.4 K/UL (ref 4.8–10.8)

## 2023-01-21 PROCEDURE — 74176 CT ABD & PELVIS W/O CONTRAST: CPT

## 2023-01-21 PROCEDURE — A9270 NON-COVERED ITEM OR SERVICE: HCPCS | Performed by: EMERGENCY MEDICINE

## 2023-01-21 PROCEDURE — 80053 COMPREHEN METABOLIC PANEL: CPT

## 2023-01-21 PROCEDURE — 700102 HCHG RX REV CODE 250 W/ 637 OVERRIDE(OP): Performed by: EMERGENCY MEDICINE

## 2023-01-21 PROCEDURE — 36415 COLL VENOUS BLD VENIPUNCTURE: CPT

## 2023-01-21 PROCEDURE — 85025 COMPLETE CBC W/AUTO DIFF WBC: CPT

## 2023-01-21 PROCEDURE — 700111 HCHG RX REV CODE 636 W/ 250 OVERRIDE (IP): Performed by: EMERGENCY MEDICINE

## 2023-01-21 PROCEDURE — 96374 THER/PROPH/DIAG INJ IV PUSH: CPT

## 2023-01-21 PROCEDURE — 99285 EMERGENCY DEPT VISIT HI MDM: CPT

## 2023-01-21 PROCEDURE — 700105 HCHG RX REV CODE 258: Performed by: EMERGENCY MEDICINE

## 2023-01-21 RX ORDER — SODIUM CHLORIDE, SODIUM LACTATE, POTASSIUM CHLORIDE, CALCIUM CHLORIDE 600; 310; 30; 20 MG/100ML; MG/100ML; MG/100ML; MG/100ML
1000 INJECTION, SOLUTION INTRAVENOUS ONCE
Status: DISCONTINUED | OUTPATIENT
Start: 2023-01-21 | End: 2023-01-21

## 2023-01-21 RX ORDER — TRAMADOL HYDROCHLORIDE 50 MG/1
50 TABLET ORAL EVERY 4 HOURS PRN
Qty: 20 TABLET | Refills: 0 | Status: SHIPPED | OUTPATIENT
Start: 2023-01-21 | End: 2023-01-26

## 2023-01-21 RX ORDER — AMOXICILLIN AND CLAVULANATE POTASSIUM 875; 125 MG/1; MG/1
1 TABLET, FILM COATED ORAL ONCE
Status: COMPLETED | OUTPATIENT
Start: 2023-01-21 | End: 2023-01-21

## 2023-01-21 RX ORDER — AMOXICILLIN AND CLAVULANATE POTASSIUM 875; 125 MG/1; MG/1
1 TABLET, FILM COATED ORAL 2 TIMES DAILY
Qty: 14 TABLET | Refills: 0 | Status: SHIPPED | OUTPATIENT
Start: 2023-01-21 | End: 2023-01-28

## 2023-01-21 RX ORDER — SODIUM CHLORIDE 9 MG/ML
1000 INJECTION, SOLUTION INTRAVENOUS ONCE
Status: COMPLETED | OUTPATIENT
Start: 2023-01-21 | End: 2023-01-21

## 2023-01-21 RX ADMIN — SODIUM CHLORIDE 1000 ML: 9 INJECTION, SOLUTION INTRAVENOUS at 10:17

## 2023-01-21 RX ADMIN — AMOXICILLIN AND CLAVULANATE POTASSIUM 1 TABLET: 875; 125 TABLET, FILM COATED ORAL at 10:00

## 2023-01-21 RX ADMIN — FENTANYL CITRATE 50 MCG: 50 INJECTION, SOLUTION INTRAMUSCULAR; INTRAVENOUS at 10:17

## 2023-01-21 ASSESSMENT — FIBROSIS 4 INDEX: FIB4 SCORE: 0.92

## 2023-01-21 NOTE — ED TRIAGE NOTES
"Presents with a hx of diverticulosis.  She was seen by her PCP recently with a CT scan of her abdomen completed this pat Tuesday. She C/O escalating generalized mid abdominal pain with associated nausea worsening for the past few days. \"The pain keeps me up at night.\"   Chief Complaint   Patient presents with    Abdominal Pain     BP (!) 165/85   Pulse 72   Temp 36.7 °C (98 °F) (Temporal)   Resp 19   Ht 1.651 m (5' 5\")   Wt 99 kg (218 lb 4.1 oz)   LMP 08/11/2010   SpO2 95%   BMI 36.32 kg/m²   Has this patient been vaccinated for COVID YES  If not, would they like to be vaccinated while in the ER if eligible?  N/A  Would the patient like to speak with the ERP about the possibility of receiving the COVID vaccine today before making a decision? N/A     "

## 2023-01-21 NOTE — ED PROVIDER NOTES
ED Provider Note    CHIEF COMPLAINT  Chief Complaint   Patient presents with    Abdominal Pain       EXTERNAL RECORDS REVIEWED  Outpatient Notes 3 days ago patient has CT scan showing sigmoid diverticulitis.  Primary care provider notes    HPI/ROS  LIMITATION TO HISTORY   Select: : None  OUTSIDE HISTORIAN(S):      Elizabeth Nuñez is a 67 y.o. female who presents to the ED with complaints of increasing abdominal pain.  The patient has a history of rheumatoid arthritis currently is on methotrexate, hydroxychloroquine.  The patient started having an increasing dose of her methotrexate about 6 weeks ago and at that point she started having intermittent abdominal pains that became more continuous about a week ago.  The patient was seen by her primary care provider did have a CT scan done on the 18th which showed sigmoid diverticulitis.  The patient was not started on antibiotics and the patient was told that if she has any worsening symptoms to go to the emergency department.  Patient states the pain is getting worse she cannot sleep at night so she presents emerged part with complaints of the above.  Patient denies any overt fevers chills describes increasing abdominal pain denies any diarrhea denies any vomiting denies any other symptoms.    PAST MEDICAL HISTORY   has a past medical history of Adenomatous polyp of colon (4/22/2009), Allergic rhinitis, Carpal tunnel syndrome (4/22/2009), Diverticulitis (4/22/2009), Dyslipidemia (4/22/2009), GERD (gastroesophageal reflux disease) (4/22/2009), Hyperlipidemia LDL goal <70 (4/22/2009), Hypertension, Hypothyroidism, Impaired fasting glucose (4/22/2009), NSTEMI (non-ST elevated myocardial infarction) ([facility]) (1/17/2023), Obesity (BMI 30-39.9) (7/13/2017), Other microscopic colitis (1/17/2023), Pruritic dermatitis (4/22/2009), Pulmonary nodule (4/22/2009), Rheumatoid arthritis ([facility]), Sjogren's syndrome ([facility]), and Syncope.    SURGICAL HISTORY   has a past surgical history  "that includes hysterectomy robotic (2009); metatarsal head resection; arthroscopy, knee (); umbilical hernia repair; and primary c section.    FAMILY HISTORY  Family History   Problem Relation Age of Onset    Cancer Mother         ovarian, age 52    Cancer Maternal Aunt         breast, dx age 33,  age 62    Breast Cancer Maternal Aunt     Cancer Maternal Aunt         ovarian cancer    Cancer Other          cousin, breast cancer age 40    Cancer Paternal Grandfather         stomach    Cancer Paternal Uncle         stomach       SOCIAL HISTORY  Social History     Tobacco Use    Smoking status: Never    Smokeless tobacco: Never   Vaping Use    Vaping Use: Never used   Substance and Sexual Activity    Alcohol use: Yes     Comment: Occasionally    Drug use: No    Sexual activity: Not on file       CURRENT MEDICATIONS  Home Medications    **Home medications have not yet been reviewed for this encounter**         ALLERGIES  Allergies   Allergen Reactions    Misc. Sulfonamide Containing Compounds Hives and Itching    Morphine Hives and Itching    Sulfa Drugs Hives and Itching    Quinine Hives     Hives  Hives  Hives       PHYSICAL EXAM  VITAL SIGNS: /74   Pulse 67   Temp 36.7 °C (98 °F) (Temporal)   Resp 18   Ht 1.651 m (5' 5\")   Wt 99 kg (218 lb 4.1 oz)   LMP 2010   SpO2 98%   BMI 36.32 kg/m²    Constitutional: Well-developed no acute distress   HENT: Normocephalic, Atraumatic, Bilateral external ears normal.  Eyes:  conjunctiva are normal.   Neck: Supple.  Nontender midline  Cardiovascular: Regular rate and rhythm without murmurs gallops or rubs.   Thorax & Lungs: No respiratory distress. Breathing comfortably. Lungs are clear to auscultation bilaterally, there are no wheezes no rales. Chest wall is nontender.  Abdomen: Soft, tender in the suprapubic and lower quadrant regions no significant tenderness in the upper quadrants no peritoneal findings.    Skin: Warm, Dry, No erythema,   Back: " No tenderness, No CVA tenderness.  Musculoskeletal: No clubbing cyanosis or edema good range of motion   Neurologic: Alert & oriented x 3, normal sensation moving all extremities appears normal   Psychiatric: Affect normal, Judgment normal, Mood normal.       DIAGNOSTIC STUDIES / PROCEDURES  EKG    LABS  Results for orders placed or performed during the hospital encounter of 01/21/23   CBC WITH DIFFERENTIAL   Result Value Ref Range    WBC 14.4 (H) 4.8 - 10.8 K/uL    RBC 4.50 4.20 - 5.40 M/uL    Hemoglobin 13.8 12.0 - 16.0 g/dL    Hematocrit 41.6 37.0 - 47.0 %    MCV 92.4 81.4 - 97.8 fL    MCH 30.7 27.0 - 33.0 pg    MCHC 33.2 (L) 33.6 - 35.0 g/dL    RDW 49.1 35.9 - 50.0 fL    Platelet Count 300 164 - 446 K/uL    MPV 9.5 9.0 - 12.9 fL    Neutrophils-Polys 79.30 (H) 44.00 - 72.00 %    Lymphocytes 10.80 (L) 22.00 - 41.00 %    Monocytes 9.10 0.00 - 13.40 %    Eosinophils 0.10 0.00 - 6.90 %    Basophils 0.30 0.00 - 1.80 %    Immature Granulocytes 0.40 0.00 - 0.90 %    Nucleated RBC 0.00 /100 WBC    Neutrophils (Absolute) 11.43 (H) 2.00 - 7.15 K/uL    Lymphs (Absolute) 1.56 1.00 - 4.80 K/uL    Monos (Absolute) 1.32 (H) 0.00 - 0.85 K/uL    Eos (Absolute) 0.02 0.00 - 0.51 K/uL    Baso (Absolute) 0.04 0.00 - 0.12 K/uL    Immature Granulocytes (abs) 0.06 0.00 - 0.11 K/uL    NRBC (Absolute) 0.00 K/uL   COMP METABOLIC PANEL   Result Value Ref Range    Sodium 138 135 - 145 mmol/L    Potassium 5.0 3.6 - 5.5 mmol/L    Chloride 101 96 - 112 mmol/L    Co2 25 20 - 33 mmol/L    Anion Gap 12.0 7.0 - 16.0    Glucose 87 65 - 99 mg/dL    Bun 9 8 - 22 mg/dL    Creatinine 0.74 0.50 - 1.40 mg/dL    Calcium 9.3 8.4 - 10.2 mg/dL    AST(SGOT) 27 12 - 45 U/L    ALT(SGPT) 32 2 - 50 U/L    Alkaline Phosphatase 99 30 - 99 U/L    Total Bilirubin 0.9 0.1 - 1.5 mg/dL    Albumin 3.6 3.2 - 4.9 g/dL    Total Protein 7.0 6.0 - 8.2 g/dL    Globulin 3.4 1.9 - 3.5 g/dL    A-G Ratio 1.1 g/dL   CORRECTED CALCIUM   Result Value Ref Range    Correct Calcium 9.6  8.5 - 10.5 mg/dL   ESTIMATED GFR   Result Value Ref Range    GFR (CKD-EPI) 89 >60 mL/min/1.73 m 2         RADIOLOGY      CT-RENAL COLIC EVALUATION(A/P W/O)   Final Result      1.  Persistent findings of sigmoid diverticulitis, without evidence of perforation. No diverticular abscess.   2.  Stable small to moderate hiatal hernia.          COURSE & MEDICAL DECISION MAKING    ED Observation Status? Yes; I am placing the patient in to an observation status due to a diagnostic uncertainty as well as therapeutic intensity. Patient placed in observation status at 9:46 AM, 1/21/2023.     Observation plan is as follows: IV fluids pain medications.  We will repeat CT scan noncontrasted to evaluate for signs of perforation and abscess.  IV fluids for hydration purposes.  The patient is complaining of pain and I will give her some pain medications.    Upon Reevaluation, the patient's condition has: Improved; and will be discharged.    Patient discharged from ED Observation status at 12:00 PM  (Time) 1/21/2023 (Date).     INITIAL ASSESSMENT AND PLAN  Care Narrative: Patient presents with complaints of the abdominal pain.  CT scan does show sigmoid diverticulitis from 118.  The patient was not started on antibiotics.  I started a dose of Augmentin here in the emergency department and concerns are for microperforation perforation and abscess formation so repeat CT scan was done today.  CT scan shows no signs of perforations essentially no change she does have an elevated white blood cell count so I do feel that she is immunocompetent.  At this point I will continue with Augmentin as a discharge.  She was given IV fluids and pain medications here feeling much improved.  I will give her tramadol as well for pain control.  She has been seen by Dr. Enriquez in the past of recommended follow-up with Dr. Enriquez GI specialist for further outpatient treatment care return as needed.    ADDITIONAL PROBLEM LIST AND DISPOSITION  1.   Diverticulitis I will start the patient on antibiotics.  Recommend her weekly dose of methotrexate be postponed discussed this with her rheumatologist  2.  History of rheumatoid arthritis.  The patient is on methotrexate was complicating the infection.  See comments above.                FINAL DIAGNOSIS  1. Diverticulitis            The patient will return for new or worsening symptoms and is stable at the time of discharge.    The patient is referred to a primary physician for blood pressure management, diabetic screening, and for all other preventative health concerns.        DISPOSITION:  Patient will be discharged home in stable condition.    FOLLOW UP:  Randell Enriquez M.D.  [id number] [facility] [address]  [phone]    Schedule an appointment as soon as possible for a visit in 1 week  For re-check, Return if any symptoms worsen      OUTPATIENT MEDICATIONS:  New Prescriptions    AMOXICILLIN-CLAVULANATE (AUGMENTIN) 875-125 MG TAB    Take 1 Tablet by mouth 2 times a day for 7 days.    TRAMADOL (ULTRAM) 50 MG TAB    Take 1 Tablet by mouth every four hours as needed for Moderate Pain for up to 5 days.         Electronically signed by: Piyush Almazan M.D., 1/21/2023 9:43 AM

## 2023-02-20 ENCOUNTER — OFFICE VISIT (OUTPATIENT)
Dept: URGENT CARE | Facility: CLINIC | Age: 68
End: 2023-02-20
Payer: MEDICARE

## 2023-02-20 VITALS
RESPIRATION RATE: 14 BRPM | WEIGHT: 215 LBS | HEART RATE: 60 BPM | HEIGHT: 65 IN | SYSTOLIC BLOOD PRESSURE: 126 MMHG | TEMPERATURE: 97.7 F | OXYGEN SATURATION: 98 % | DIASTOLIC BLOOD PRESSURE: 84 MMHG | BODY MASS INDEX: 35.82 KG/M2

## 2023-02-20 DIAGNOSIS — J06.9 VIRAL URI WITH COUGH: ICD-10-CM

## 2023-02-20 LAB
EXTERNAL QUALITY CONTROL: NORMAL
INT CON NEG: NORMAL
INT CON POS: NORMAL
SARS-COV+SARS-COV-2 AG RESP QL IA.RAPID: NEGATIVE

## 2023-02-20 PROCEDURE — 87426 SARSCOV CORONAVIRUS AG IA: CPT | Performed by: STUDENT IN AN ORGANIZED HEALTH CARE EDUCATION/TRAINING PROGRAM

## 2023-02-20 PROCEDURE — 99213 OFFICE O/P EST LOW 20 MIN: CPT | Mod: CS,25 | Performed by: STUDENT IN AN ORGANIZED HEALTH CARE EDUCATION/TRAINING PROGRAM

## 2023-02-20 PROCEDURE — 94640 AIRWAY INHALATION TREATMENT: CPT | Performed by: STUDENT IN AN ORGANIZED HEALTH CARE EDUCATION/TRAINING PROGRAM

## 2023-02-20 RX ORDER — IPRATROPIUM BROMIDE AND ALBUTEROL SULFATE 2.5; .5 MG/3ML; MG/3ML
3 SOLUTION RESPIRATORY (INHALATION) ONCE
Status: DISCONTINUED | OUTPATIENT
Start: 2023-02-20 | End: 2023-02-20

## 2023-02-20 RX ORDER — ALBUTEROL SULFATE 2.5 MG/3ML
2.5 SOLUTION RESPIRATORY (INHALATION) ONCE
Status: COMPLETED | OUTPATIENT
Start: 2023-02-20 | End: 2023-02-20

## 2023-02-20 RX ORDER — INHALER, ASSIST DEVICES
1 SPACER (EA) MISCELLANEOUS ONCE
Qty: 1 EACH | Refills: 0 | Status: SHIPPED | OUTPATIENT
Start: 2023-02-20 | End: 2023-02-20

## 2023-02-20 RX ADMIN — ALBUTEROL SULFATE 2.5 MG: 2.5 SOLUTION RESPIRATORY (INHALATION) at 09:44

## 2023-02-20 ASSESSMENT — FIBROSIS 4 INDEX: FIB4 SCORE: 1.05

## 2023-02-20 NOTE — PROGRESS NOTES
Subjective:   CHIEF COMPLAINT  Chief Complaint   Patient presents with    Cough     Wheezing        HPI  Elizabeth Neha Nuñez is a 67 y.o. female who presents with a chief complaint of a cough.  Approximately 2 days ago she developed a tickle in her throat then yesterday she developed a cough.  Says cough kept her up overnight.  She has a history of asthma and said tried using albuterol which seem to trigger the cough but did provide some relief.  Reports wheezing but no shortness of breath.  Cough is nonproductive.  No fevers.  Says everyone at home is sick.    REVIEW OF SYSTEMS  General: no fever or chills  GI: no nausea or vomiting  See HPI for further details.    PAST MEDICAL HISTORY  Patient Active Problem List    Diagnosis Date Noted    NSTEMI (non-ST elevated myocardial infarction) (HCC) 01/17/2023    Other microscopic colitis 01/17/2023    Sjogren's syndrome (HCC)     Rheumatoid arthritis (HCC)     IFG (impaired fasting glucose) 01/05/2019    Obesity (BMI 30-39.9) 07/13/2017    Allergy to pollen     Hypertension 02/22/2012    Syncope 05/20/2010    Hypothyroid 04/22/2009    Pulmonary nodule 04/22/2009    Hyperlipidemia LDL goal <70 04/22/2009    Pruritic dermatitis 04/22/2009    History of diverticulitis of colon 04/22/2009    Carpal tunnel syndrome 04/22/2009    Adenomatous polyp of colon 04/22/2009    GERD (gastroesophageal reflux disease) 04/22/2009       SURGICAL HISTORY   has a past surgical history that includes hysterectomy robotic (2/19/2009); metatarsal head resection; arthroscopy, knee (2005); umbilical hernia repair; and primary c section.    ALLERGIES  Allergies   Allergen Reactions    Misc. Sulfonamide Containing Compounds Hives and Itching    Morphine Hives and Itching    Sulfa Drugs Hives and Itching    Quinine Hives     Hives  Hives  Hives       CURRENT MEDICATIONS  acyclovir  albuterol  albuterol Aers  aspirin  B Complex Caps  CALCIUM 500 +D PO  clopidogrel Tabs  hydroxychloroquine  "Tabs  ketoconazole Crea  levothyroxine Tabs  lisinopril Tabs  Magnesium Caps  omeprazole  rosuvastatin Tabs    SOCIAL HISTORY  Social History     Tobacco Use    Smoking status: Never    Smokeless tobacco: Never   Vaping Use    Vaping Use: Never used   Substance and Sexual Activity    Alcohol use: Yes     Comment: Occasionally    Drug use: No    Sexual activity: Not on file       FAMILY HISTORY  Family History   Problem Relation Age of Onset    Cancer Mother         ovarian, age 52    Cancer Maternal Aunt         breast, dx age 33,  age 62    Breast Cancer Maternal Aunt     Cancer Maternal Aunt         ovarian cancer    Cancer Other          cousin, breast cancer age 40    Cancer Paternal Grandfather         stomach    Cancer Paternal Uncle         stomach          Objective:   PHYSICAL EXAM  VITAL SIGNS: /84   Pulse 60   Temp 36.5 °C (97.7 °F) (Temporal)   Resp 14   Ht 1.651 m (5' 5\")   Wt 97.5 kg (215 lb)   LMP 2010   SpO2 98%   BMI 35.78 kg/m²     Gen: no acute distress, normal voice  Skin: dry, intact, moist mucosal membranes  Eyes: No conjunctival injection bilaterally.  Neck: Normal range of motion. No meningeal signs.   Lungs: CTAB w/ symmetric expansion  CV: RRR w/o murmurs or clicks  Psych: normal affect, normal judgement, alert, awake    POC COVID: Negative     Assessment/Plan:     1. Viral URI with cough  POCT SARS-COV Antigen ARIN Manual Result    albuterol (PROVENTIL) 2.5mg/3ml nebulizer solution 2.5 mg    Spacer/Aero-Holding Chambers (AEROCHAMBER PLUS-FLOW SIGNAL) Misc    DISCONTINUED: ipratropium-albuterol (DUONEB) nebulizer solution      History and physical exam consistent with viral upper respiratory infection.  She was given a breathing treatment which provided symptomatic relief.  Recommended continuation of albuterol HFA.  A spacer sent to the pharmacy to help aid with the use of HFA.  Return to urgent care any new/worsening symptoms or further questions or concerns.  " Patient understood everything discussed.  All questions were answered.      Differential diagnosis, natural history, supportive care, and indications for immediate follow-up discussed. All questions answered. Patient agrees with the plan of care.    Follow-up as needed if symptoms worsen or fail to improve to PCP, Urgent care or Emergency Room.    Please note that this dictation was created using voice recognition software. I have made a reasonable attempt to correct obvious errors, but I expect that there are errors of grammar and possibly content that I did not discover before finalizing the note.

## 2023-03-21 DIAGNOSIS — E03.9 ACQUIRED HYPOTHYROIDISM: ICD-10-CM

## 2023-03-21 DIAGNOSIS — R94.6 ABNORMAL THYROID FUNCTION TEST: ICD-10-CM

## 2023-03-21 RX ORDER — LEVOTHYROXINE SODIUM 137 UG/1
TABLET ORAL
Qty: 30 TABLET | Refills: 0 | Status: SHIPPED | OUTPATIENT
Start: 2023-03-21 | End: 2023-04-17

## 2023-04-17 DIAGNOSIS — R94.6 ABNORMAL THYROID FUNCTION TEST: ICD-10-CM

## 2023-04-17 DIAGNOSIS — E03.9 ACQUIRED HYPOTHYROIDISM: ICD-10-CM

## 2023-04-17 RX ORDER — LEVOTHYROXINE SODIUM 137 UG/1
TABLET ORAL
Qty: 30 TABLET | Refills: 0 | Status: SHIPPED | OUTPATIENT
Start: 2023-04-17 | End: 2023-05-03 | Stop reason: SDUPTHER

## 2023-04-17 NOTE — TELEPHONE ENCOUNTER
Received request via: Pharmacy    Was the patient seen in the last year in this department? Yes  01/17/2023  Does the patient have an active prescription (recently filled or refills available) for medication(s) requested? No    Does the patient have longterm Plus and need 100 day supply (blood pressure, diabetes and cholesterol meds only)? Patient does not have SCP

## 2023-04-28 ENCOUNTER — HOSPITAL ENCOUNTER (OUTPATIENT)
Dept: LAB | Facility: MEDICAL CENTER | Age: 68
End: 2023-04-28
Attending: NURSE PRACTITIONER
Payer: MEDICARE

## 2023-04-28 DIAGNOSIS — M05.79 RHEUMATOID ARTHRITIS INVOLVING MULTIPLE SITES WITH POSITIVE RHEUMATOID FACTOR (HCC): ICD-10-CM

## 2023-04-28 DIAGNOSIS — E03.9 ACQUIRED HYPOTHYROIDISM: ICD-10-CM

## 2023-04-28 DIAGNOSIS — R73.01 IFG (IMPAIRED FASTING GLUCOSE): ICD-10-CM

## 2023-04-28 DIAGNOSIS — R94.6 ABNORMAL THYROID FUNCTION TEST: ICD-10-CM

## 2023-04-28 DIAGNOSIS — E78.5 HYPERLIPIDEMIA LDL GOAL <70: ICD-10-CM

## 2023-04-28 DIAGNOSIS — M35.00 SJOGREN'S SYNDROME, WITH UNSPECIFIED ORGAN INVOLVEMENT (HCC): ICD-10-CM

## 2023-04-28 LAB
ALBUMIN SERPL BCP-MCNC: 4 G/DL (ref 3.2–4.9)
ALBUMIN/GLOB SERPL: 1.6 G/DL
ALP SERPL-CCNC: 69 U/L (ref 30–99)
ALT SERPL-CCNC: 19 U/L (ref 2–50)
ANION GAP SERPL CALC-SCNC: 9 MMOL/L (ref 7–16)
AST SERPL-CCNC: 16 U/L (ref 12–45)
BASOPHILS # BLD AUTO: 0.9 % (ref 0–1.8)
BASOPHILS # BLD: 0.05 K/UL (ref 0–0.12)
BILIRUB SERPL-MCNC: 0.6 MG/DL (ref 0.1–1.5)
BUN SERPL-MCNC: 14 MG/DL (ref 8–22)
CALCIUM ALBUM COR SERPL-MCNC: 9.1 MG/DL (ref 8.5–10.5)
CALCIUM SERPL-MCNC: 9.1 MG/DL (ref 8.4–10.2)
CHLORIDE SERPL-SCNC: 106 MMOL/L (ref 96–112)
CHOLEST SERPL-MCNC: 163 MG/DL (ref 100–199)
CO2 SERPL-SCNC: 26 MMOL/L (ref 20–33)
CREAT SERPL-MCNC: 0.65 MG/DL (ref 0.5–1.4)
CRP SERPL HS-MCNC: 0.62 MG/DL (ref 0–0.75)
EOSINOPHIL # BLD AUTO: 0.21 K/UL (ref 0–0.51)
EOSINOPHIL NFR BLD: 3.9 % (ref 0–6.9)
ERYTHROCYTE [DISTWIDTH] IN BLOOD BY AUTOMATED COUNT: 48.2 FL (ref 35.9–50)
ERYTHROCYTE [SEDIMENTATION RATE] IN BLOOD BY WESTERGREN METHOD: 16 MM/HOUR (ref 0–25)
EST. AVERAGE GLUCOSE BLD GHB EST-MCNC: 140 MG/DL
GFR SERPLBLD CREATININE-BSD FMLA CKD-EPI: 97 ML/MIN/1.73 M 2
GLOBULIN SER CALC-MCNC: 2.5 G/DL (ref 1.9–3.5)
GLUCOSE SERPL-MCNC: 94 MG/DL (ref 65–99)
HBA1C MFR BLD: 6.5 % (ref 4–5.6)
HCT VFR BLD AUTO: 44.3 % (ref 37–47)
HDLC SERPL-MCNC: 60 MG/DL
HGB BLD-MCNC: 14.5 G/DL (ref 12–16)
IMM GRANULOCYTES # BLD AUTO: 0.01 K/UL (ref 0–0.11)
IMM GRANULOCYTES NFR BLD AUTO: 0.2 % (ref 0–0.9)
LDLC SERPL CALC-MCNC: 87 MG/DL
LYMPHOCYTES # BLD AUTO: 1.43 K/UL (ref 1–4.8)
LYMPHOCYTES NFR BLD: 26.8 % (ref 22–41)
MCH RBC QN AUTO: 30.1 PG (ref 27–33)
MCHC RBC AUTO-ENTMCNC: 32.7 G/DL (ref 33.6–35)
MCV RBC AUTO: 92.1 FL (ref 81.4–97.8)
MONOCYTES # BLD AUTO: 0.51 K/UL (ref 0–0.85)
MONOCYTES NFR BLD AUTO: 9.6 % (ref 0–13.4)
NEUTROPHILS # BLD AUTO: 3.12 K/UL (ref 2–7.15)
NEUTROPHILS NFR BLD: 58.6 % (ref 44–72)
NRBC # BLD AUTO: 0 K/UL
NRBC BLD-RTO: 0 /100 WBC
PLATELET # BLD AUTO: 254 K/UL (ref 164–446)
PMV BLD AUTO: 9.5 FL (ref 9–12.9)
POTASSIUM SERPL-SCNC: 4.4 MMOL/L (ref 3.6–5.5)
PROT SERPL-MCNC: 6.5 G/DL (ref 6–8.2)
RBC # BLD AUTO: 4.81 M/UL (ref 4.2–5.4)
SODIUM SERPL-SCNC: 141 MMOL/L (ref 135–145)
T4 FREE SERPL-MCNC: 1.7 NG/DL (ref 0.93–1.7)
TRIGL SERPL-MCNC: 82 MG/DL (ref 0–149)
TSH SERPL DL<=0.005 MIU/L-ACNC: 1.47 UIU/ML (ref 0.38–5.33)
WBC # BLD AUTO: 5.3 K/UL (ref 4.8–10.8)

## 2023-04-28 PROCEDURE — 85652 RBC SED RATE AUTOMATED: CPT

## 2023-04-28 PROCEDURE — 80061 LIPID PANEL: CPT

## 2023-04-28 PROCEDURE — 86140 C-REACTIVE PROTEIN: CPT

## 2023-04-28 PROCEDURE — 83036 HEMOGLOBIN GLYCOSYLATED A1C: CPT | Mod: GA

## 2023-04-28 PROCEDURE — 36415 COLL VENOUS BLD VENIPUNCTURE: CPT

## 2023-04-28 PROCEDURE — 85025 COMPLETE CBC W/AUTO DIFF WBC: CPT

## 2023-04-28 PROCEDURE — 84439 ASSAY OF FREE THYROXINE: CPT

## 2023-04-28 PROCEDURE — 80053 COMPREHEN METABOLIC PANEL: CPT

## 2023-04-28 PROCEDURE — 84443 ASSAY THYROID STIM HORMONE: CPT

## 2023-05-03 ENCOUNTER — OFFICE VISIT (OUTPATIENT)
Dept: MEDICAL GROUP | Facility: MEDICAL CENTER | Age: 68
End: 2023-05-03
Payer: MEDICARE

## 2023-05-03 VITALS
TEMPERATURE: 97.2 F | SYSTOLIC BLOOD PRESSURE: 128 MMHG | OXYGEN SATURATION: 97 % | HEIGHT: 65 IN | WEIGHT: 233 LBS | DIASTOLIC BLOOD PRESSURE: 74 MMHG | RESPIRATION RATE: 14 BRPM | BODY MASS INDEX: 38.82 KG/M2 | HEART RATE: 45 BPM

## 2023-05-03 DIAGNOSIS — E78.5 HYPERLIPIDEMIA LDL GOAL <100: ICD-10-CM

## 2023-05-03 DIAGNOSIS — R00.1 BRADYCARDIA: ICD-10-CM

## 2023-05-03 DIAGNOSIS — E03.9 ACQUIRED HYPOTHYROIDISM: ICD-10-CM

## 2023-05-03 DIAGNOSIS — R73.01 IFG (IMPAIRED FASTING GLUCOSE): ICD-10-CM

## 2023-05-03 PROCEDURE — 99214 OFFICE O/P EST MOD 30 MIN: CPT | Performed by: NURSE PRACTITIONER

## 2023-05-03 RX ORDER — LEVOTHYROXINE SODIUM 137 UG/1
TABLET ORAL
Qty: 90 TABLET | Refills: 0
Start: 2023-05-03 | End: 2023-05-24 | Stop reason: SDUPTHER

## 2023-05-03 RX ORDER — ROSUVASTATIN CALCIUM 5 MG/1
5 TABLET, COATED ORAL DAILY
Qty: 90 TABLET | Refills: 0
Start: 2023-05-03 | End: 2023-06-09 | Stop reason: SDUPTHER

## 2023-05-03 ASSESSMENT — FIBROSIS 4 INDEX: FIB4 SCORE: 0.95

## 2023-05-03 NOTE — PROGRESS NOTES
Subjective:     Elizabeth Nuñez is a 67 y.o. female who presents with IFG.    HPI:   Seen in f/u for IFG.   She has low HR today at 45 bpm.  She has long hx of low HR. She had a MI last fall.  She is not on betablocker d/t her HR.  She will see cardiac in august.  No chest pain.  No sx r/t the low HR.   She is trying to eat healthy but does like her sweets.  She is exercising at the gym 1x/wk and working on her garden.  She has not been taking her crestor daily d/t work.  She is working at nite.   Reviewed lab with pt.  GFR, corrected ca++, TSH, T4, CMP is wnl.  A1c is up from 5.8 to 6.5.  not on DM med yet.  LP shows trg and HDL at goal.  LDL almost at 87.  Goal is <70.    Her last thyroid lab was abn but now wnl.  Stable on levothyroxine.  Taking med approp.      Patient Active Problem List    Diagnosis Date Noted    NSTEMI (non-ST elevated myocardial infarction) (HCC) 01/17/2023    Other microscopic colitis 01/17/2023    Sjogren's syndrome (HCC)     Rheumatoid arthritis (HCC)     IFG (impaired fasting glucose) 01/05/2019    Obesity (BMI 30-39.9) 07/13/2017    Allergy to pollen     Hypertension 02/22/2012    Syncope 05/20/2010    Hypothyroid 04/22/2009    Pulmonary nodule 04/22/2009    Hyperlipidemia LDL goal <70 04/22/2009    Pruritic dermatitis 04/22/2009    History of diverticulitis of colon 04/22/2009    Carpal tunnel syndrome 04/22/2009    Adenomatous polyp of colon 04/22/2009    GERD (gastroesophageal reflux disease) 04/22/2009       Current medicines (including changes today)  Current Outpatient Medications   Medication Sig Dispense Refill    levothyroxine (SYNTHROID) 137 MCG Tab Take 1 tab daily 6x/wk and 1.5 tab daily 1x/wk 90 Tablet 0    rosuvastatin (CRESTOR) 5 MG Tab Take 1 Tablet by mouth every day. 90 Tablet 0    lisinopril (PRINIVIL) 10 MG Tab Take 1 Tablet by mouth 2 times a day. TAKE 1 TABLET BY MOUTH TWICE A  Tablet 0    acyclovir (ZOVIRAX) 400 MG tablet TAKE ONE TABLET BY MOUTH  "TWICE A  Tablet 1    Magnesium 500 MG Cap       aspirin 81 MG EC tablet Take 81 mg by mouth every day.      hydroxychloroquine (PLAQUENIL) 200 MG Tab TAKE ONE TABLET BY MOUTH TWICE A  Tablet 2    clopidogrel (PLAVIX) 75 MG Tab Take 75 mg by mouth every day.      ketoconazole (NIZORAL) 2 % Cream Apply 1 Application topically 2 times a day. 30 g 1    albuterol 108 (90 Base) MCG/ACT Aero Soln inhalation aerosol Inhale 2 Puffs every 6 hours as needed for Shortness of Breath. 8.5 g 1    Calcium Carb-Cholecalciferol (CALCIUM 500 +D PO) Take 3 Tabs by mouth every evening.      B Complex Cap Take 1 Cap by mouth every day. 30 Cap 0    OMEPRAZOLE 20 MG PO CPDR Take 20 mg by mouth as needed (For heartburn).  11     No current facility-administered medications for this visit.       Allergies   Allergen Reactions    Misc. Sulfonamide Containing Compounds Hives and Itching    Morphine Hives and Itching    Sulfa Drugs Hives and Itching    Quinine Hives     Hives  Hives  Hives       ROS  Constitutional: Negative. Negative for fever, chills, weight loss, malaise/fatigue and diaphoresis.   HENT: Negative. Negative for hearing loss, ear pain, nosebleeds, congestion, sore throat, neck pain, tinnitus and ear discharge.   Respiratory: Negative. Negative for cough, hemoptysis, sputum production, shortness of breath, wheezing and stridor.   Cardiovascular: Negative. Negative for chest pain, palpitations, orthopnea, claudication, leg swelling and PND.   Gastrointestinal: Denies nausea, vomiting, diarrhea, constipation, heartburn, melena or hematochezia.  Genitourinary: Denies dysuria, hematuria, urinary incontinence, frequency or urgency.        Objective:     /74 (BP Location: Left arm, Patient Position: Sitting, BP Cuff Size: Adult)   Pulse (!) 45   Temp 36.2 °C (97.2 °F) (Temporal)   Resp 14   Ht 1.651 m (5' 5\")   Wt 106 kg (233 lb)   SpO2 97%  Body mass index is 38.77 kg/m².    Physical Exam:  Vitals " reviewed.  Constitutional: Oriented to person, place, and time. appears well-developed and well-nourished. No distress.   Cardiovascular: Normal rate, regular rhythm, normal heart sounds and intact distal pulses. Exam reveals no gallop and no friction rub. No murmur heard. No carotid bruits.   Pulmonary/Chest: Effort normal and breath sounds normal. No stridor. No respiratory distress. no wheezes or rales. exhibits no tenderness.   Musculoskeletal: Normal range of motion. exhibits no edema. norma pedal pulses 2+.  Lymphadenopathy: No cervical or supraclavicular adenopathy.   Neurological: Alert and oriented to person, place, and time. exhibits normal muscle tone.  Skin: Skin is warm and dry. No diaphoresis.   Psychiatric: Normal mood and affect. Behavior is normal.      Assessment and Plan:     The following treatment plan was discussed:    1. IFG (impaired fasting glucose)  HEMOGLOBIN A1C    MICROALBUMIN CREAT RATIO URINE    CMP14+LP    CANCELED: HEMOGLOBIN A1C    a1c up significantly.  she will improve low carb diet and do regular exercise. repeat lab 4 mo. f/u for review      2. Hyperlipidemia LDL goal <100  rosuvastatin (CRESTOR) 5 MG Tab    CMP14+LP    trg and HDL at goal.  LDL almost at goal.  continue healthy diet and regular exercise. take med daily. diaz lab 4 mo      3. Acquired hypothyroidism  levothyroxine (SYNTHROID) 137 MCG Tab    stable and controlled with nrmal lab.  levothyroxine is stable      4. Bradycardia      stable w/o sx. f/u with cardiac as planned.            Followup: Return in about 4 months (around 9/3/2023).

## 2023-05-15 DIAGNOSIS — B02.8 HERPES ZOSTER WITH COMPLICATION: ICD-10-CM

## 2023-05-15 PROCEDURE — 1125F AMNT PAIN NOTED PAIN PRSNT: CPT | Performed by: NURSE PRACTITIONER

## 2023-05-15 RX ORDER — ACYCLOVIR 400 MG/1
TABLET ORAL
Qty: 180 TABLET | Refills: 1 | Status: SHIPPED | OUTPATIENT
Start: 2023-05-15 | End: 2023-10-28 | Stop reason: SDUPTHER

## 2023-05-24 DIAGNOSIS — E03.9 ACQUIRED HYPOTHYROIDISM: ICD-10-CM

## 2023-05-24 RX ORDER — LEVOTHYROXINE SODIUM 137 UG/1
TABLET ORAL
Qty: 90 TABLET | Refills: 3 | Status: SHIPPED | OUTPATIENT
Start: 2023-05-24

## 2023-06-21 ENCOUNTER — HOSPITAL ENCOUNTER (OUTPATIENT)
Dept: RADIOLOGY | Facility: MEDICAL CENTER | Age: 68
End: 2023-06-21
Attending: NURSE PRACTITIONER
Payer: MEDICARE

## 2023-06-21 DIAGNOSIS — Z12.31 VISIT FOR SCREENING MAMMOGRAM: ICD-10-CM

## 2023-06-21 PROCEDURE — 77063 BREAST TOMOSYNTHESIS BI: CPT

## 2023-06-27 ENCOUNTER — APPOINTMENT (RX ONLY)
Dept: URBAN - METROPOLITAN AREA CLINIC 35 | Facility: CLINIC | Age: 68
Setting detail: DERMATOLOGY
End: 2023-06-27

## 2023-06-27 DIAGNOSIS — L57.0 ACTINIC KERATOSIS: ICD-10-CM

## 2023-06-27 DIAGNOSIS — Z71.89 OTHER SPECIFIED COUNSELING: ICD-10-CM

## 2023-06-27 DIAGNOSIS — L81.4 OTHER MELANIN HYPERPIGMENTATION: ICD-10-CM

## 2023-06-27 DIAGNOSIS — L82.1 OTHER SEBORRHEIC KERATOSIS: ICD-10-CM

## 2023-06-27 DIAGNOSIS — D22 MELANOCYTIC NEVI: ICD-10-CM

## 2023-06-27 PROBLEM — D22.5 MELANOCYTIC NEVI OF TRUNK: Status: ACTIVE | Noted: 2023-06-27

## 2023-06-27 PROBLEM — D23.5 OTHER BENIGN NEOPLASM OF SKIN OF TRUNK: Status: ACTIVE | Noted: 2023-06-27

## 2023-06-27 PROCEDURE — ? COUNSELING

## 2023-06-27 PROCEDURE — 17000 DESTRUCT PREMALG LESION: CPT

## 2023-06-27 PROCEDURE — ? LIQUID NITROGEN

## 2023-06-27 PROCEDURE — 99213 OFFICE O/P EST LOW 20 MIN: CPT | Mod: 25

## 2023-06-27 ASSESSMENT — LOCATION DETAILED DESCRIPTION DERM
LOCATION DETAILED: RIGHT MEDIAL TEMPLE
LOCATION DETAILED: RIGHT SUPERIOR UPPER BACK
LOCATION DETAILED: RIGHT MID-UPPER BACK

## 2023-06-27 ASSESSMENT — LOCATION SIMPLE DESCRIPTION DERM
LOCATION SIMPLE: RIGHT TEMPLE
LOCATION SIMPLE: RIGHT UPPER BACK

## 2023-06-27 ASSESSMENT — LOCATION ZONE DERM
LOCATION ZONE: TRUNK
LOCATION ZONE: FACE

## 2023-06-27 NOTE — PROCEDURE: LIQUID NITROGEN
Application Tool (Optional): Cry-AC
Show Applicator Variable?: Yes
Duration Of Freeze Thaw-Cycle (Seconds): 10
Post-Care Instructions: I reviewed with the patient in detail post-care instructions. Patient is to wear sunprotection, and avoid picking at any of the treated lesions. Pt may apply Vaseline to crusted or scabbing areas.
Number Of Freeze-Thaw Cycles: 1 freeze-thaw cycle
Render Post-Care Instructions In Note?: no
Consent: The patient's consent was obtained including but not limited to risks of crusting, scabbing, blistering, scarring, darker or lighter pigmentary change, recurrence, incomplete removal and infection.
Detail Level: Detailed

## 2023-07-28 ENCOUNTER — HOSPITAL ENCOUNTER (OUTPATIENT)
Dept: LAB | Facility: MEDICAL CENTER | Age: 68
End: 2023-07-28
Attending: NURSE PRACTITIONER
Payer: MEDICARE

## 2023-07-28 DIAGNOSIS — K52.839 MICROSCOPIC COLITIS, UNSPECIFIED MICROSCOPIC COLITIS TYPE: ICD-10-CM

## 2023-07-28 DIAGNOSIS — M35.00 SJOGREN'S SYNDROME, WITH UNSPECIFIED ORGAN INVOLVEMENT (HCC): ICD-10-CM

## 2023-07-28 DIAGNOSIS — Z79.899 LONG-TERM USE OF PLAQUENIL: ICD-10-CM

## 2023-07-28 DIAGNOSIS — M05.79 RHEUMATOID ARTHRITIS INVOLVING MULTIPLE SITES WITH POSITIVE RHEUMATOID FACTOR (HCC): ICD-10-CM

## 2023-07-28 DIAGNOSIS — K57.92 DIVERTICULITIS: ICD-10-CM

## 2023-07-28 LAB
ALBUMIN SERPL BCP-MCNC: 4 G/DL (ref 3.2–4.9)
ALBUMIN/GLOB SERPL: 1.3 G/DL
ALP SERPL-CCNC: 70 U/L (ref 30–99)
ALT SERPL-CCNC: 19 U/L (ref 2–50)
ANION GAP SERPL CALC-SCNC: 12 MMOL/L (ref 7–16)
AST SERPL-CCNC: 17 U/L (ref 12–45)
BASOPHILS # BLD AUTO: 0.8 % (ref 0–1.8)
BASOPHILS # BLD: 0.05 K/UL (ref 0–0.12)
BILIRUB SERPL-MCNC: 0.7 MG/DL (ref 0.1–1.5)
BUN SERPL-MCNC: 10 MG/DL (ref 8–22)
CALCIUM ALBUM COR SERPL-MCNC: 9.5 MG/DL (ref 8.5–10.5)
CALCIUM SERPL-MCNC: 9.5 MG/DL (ref 8.4–10.2)
CHLORIDE SERPL-SCNC: 102 MMOL/L (ref 96–112)
CO2 SERPL-SCNC: 24 MMOL/L (ref 20–33)
CREAT SERPL-MCNC: 0.62 MG/DL (ref 0.5–1.4)
CRP SERPL HS-MCNC: 0.7 MG/DL (ref 0–0.75)
EOSINOPHIL # BLD AUTO: 0.17 K/UL (ref 0–0.51)
EOSINOPHIL NFR BLD: 2.8 % (ref 0–6.9)
ERYTHROCYTE [DISTWIDTH] IN BLOOD BY AUTOMATED COUNT: 47.8 FL (ref 35.9–50)
ERYTHROCYTE [SEDIMENTATION RATE] IN BLOOD BY WESTERGREN METHOD: 8 MM/HOUR (ref 0–25)
FASTING STATUS PATIENT QL REPORTED: NORMAL
GFR SERPLBLD CREATININE-BSD FMLA CKD-EPI: 97 ML/MIN/1.73 M 2
GLOBULIN SER CALC-MCNC: 3 G/DL (ref 1.9–3.5)
GLUCOSE SERPL-MCNC: 91 MG/DL (ref 65–99)
HCT VFR BLD AUTO: 45.8 % (ref 37–47)
HGB BLD-MCNC: 15.2 G/DL (ref 12–16)
IMM GRANULOCYTES # BLD AUTO: 0.01 K/UL (ref 0–0.11)
IMM GRANULOCYTES NFR BLD AUTO: 0.2 % (ref 0–0.9)
LYMPHOCYTES # BLD AUTO: 1.68 K/UL (ref 1–4.8)
LYMPHOCYTES NFR BLD: 27.2 % (ref 22–41)
MCH RBC QN AUTO: 30.3 PG (ref 27–33)
MCHC RBC AUTO-ENTMCNC: 33.2 G/DL (ref 32.2–35.5)
MCV RBC AUTO: 91.2 FL (ref 81.4–97.8)
MONOCYTES # BLD AUTO: 0.56 K/UL (ref 0–0.85)
MONOCYTES NFR BLD AUTO: 9.1 % (ref 0–13.4)
NEUTROPHILS # BLD AUTO: 3.71 K/UL (ref 1.82–7.42)
NEUTROPHILS NFR BLD: 59.9 % (ref 44–72)
NRBC # BLD AUTO: 0 K/UL
NRBC BLD-RTO: 0 /100 WBC (ref 0–0.2)
PLATELET # BLD AUTO: 271 K/UL (ref 164–446)
PMV BLD AUTO: 9.3 FL (ref 9–12.9)
POTASSIUM SERPL-SCNC: 4.3 MMOL/L (ref 3.6–5.5)
PROT SERPL-MCNC: 7 G/DL (ref 6–8.2)
RBC # BLD AUTO: 5.02 M/UL (ref 4.2–5.4)
SODIUM SERPL-SCNC: 138 MMOL/L (ref 135–145)
WBC # BLD AUTO: 6.2 K/UL (ref 4.8–10.8)

## 2023-07-28 PROCEDURE — 36415 COLL VENOUS BLD VENIPUNCTURE: CPT

## 2023-07-28 PROCEDURE — 82784 ASSAY IGA/IGD/IGG/IGM EACH: CPT

## 2023-07-28 PROCEDURE — 85025 COMPLETE CBC W/AUTO DIFF WBC: CPT

## 2023-07-28 PROCEDURE — 86140 C-REACTIVE PROTEIN: CPT

## 2023-07-28 PROCEDURE — 84165 PROTEIN E-PHORESIS SERUM: CPT

## 2023-07-28 PROCEDURE — 80053 COMPREHEN METABOLIC PANEL: CPT

## 2023-07-28 PROCEDURE — 84155 ASSAY OF PROTEIN SERUM: CPT | Mod: XU

## 2023-07-28 PROCEDURE — 85652 RBC SED RATE AUTOMATED: CPT

## 2023-07-29 LAB
IGA SERPL-MCNC: 267 MG/DL (ref 68–408)
IGG SERPL-MCNC: 777 MG/DL (ref 768–1632)
IGM SERPL-MCNC: 84 MG/DL (ref 35–263)

## 2023-07-31 LAB
ALBUMIN SERPL ELPH-MCNC: 3.95 G/DL (ref 3.75–5.01)
ALPHA1 GLOB SERPL ELPH-MCNC: 0.3 G/DL (ref 0.19–0.46)
ALPHA2 GLOB SERPL ELPH-MCNC: 0.82 G/DL (ref 0.48–1.05)
B-GLOBULIN SERPL ELPH-MCNC: 0.84 G/DL (ref 0.48–1.1)
EER PROT ELECT SER Q1092: NORMAL
GAMMA GLOB SERPL ELPH-MCNC: 0.88 G/DL (ref 0.62–1.51)
INTERPRETATION SERPL IFE-IMP: NORMAL
MONOCLONAL PROTEIN NL11656: NORMAL G/DL
PROT SERPL-MCNC: 6.8 G/DL (ref 6.3–8.2)

## 2023-09-13 ENCOUNTER — HOSPITAL ENCOUNTER (OUTPATIENT)
Dept: LAB | Facility: MEDICAL CENTER | Age: 68
End: 2023-09-13
Attending: NURSE PRACTITIONER
Payer: MEDICARE

## 2023-09-13 DIAGNOSIS — R73.01 IFG (IMPAIRED FASTING GLUCOSE): ICD-10-CM

## 2023-09-13 LAB
ALBUMIN SERPL BCP-MCNC: 3.8 G/DL (ref 3.2–4.9)
ALBUMIN/GLOB SERPL: 1.3 G/DL
ALP SERPL-CCNC: 68 U/L (ref 30–99)
ALT SERPL-CCNC: 18 U/L (ref 2–50)
ANION GAP SERPL CALC-SCNC: 12 MMOL/L (ref 7–16)
AST SERPL-CCNC: 15 U/L (ref 12–45)
BILIRUB SERPL-MCNC: 0.6 MG/DL (ref 0.1–1.5)
BUN SERPL-MCNC: 11 MG/DL (ref 8–22)
CALCIUM ALBUM COR SERPL-MCNC: 9.5 MG/DL (ref 8.5–10.5)
CALCIUM SERPL-MCNC: 9.3 MG/DL (ref 8.4–10.2)
CHLORIDE SERPL-SCNC: 103 MMOL/L (ref 96–112)
CHOLEST SERPL-MCNC: 149 MG/DL (ref 100–199)
CO2 SERPL-SCNC: 25 MMOL/L (ref 20–33)
CREAT SERPL-MCNC: 0.63 MG/DL (ref 0.5–1.4)
CREAT UR-MCNC: 100.3 MG/DL
EST. AVERAGE GLUCOSE BLD GHB EST-MCNC: 120 MG/DL
FASTING STATUS PATIENT QL REPORTED: NORMAL
GFR SERPLBLD CREATININE-BSD FMLA CKD-EPI: 97 ML/MIN/1.73 M 2
GLOBULIN SER CALC-MCNC: 3 G/DL (ref 1.9–3.5)
GLUCOSE SERPL-MCNC: 103 MG/DL (ref 65–99)
HBA1C MFR BLD: 5.8 % (ref 4–5.6)
HDLC SERPL-MCNC: 59 MG/DL
LDLC SERPL CALC-MCNC: 75 MG/DL
MICROALBUMIN UR-MCNC: <1.2 MG/DL
MICROALBUMIN/CREAT UR: NORMAL MG/G (ref 0–30)
POTASSIUM SERPL-SCNC: 4.8 MMOL/L (ref 3.6–5.5)
PROT SERPL-MCNC: 6.8 G/DL (ref 6–8.2)
SODIUM SERPL-SCNC: 140 MMOL/L (ref 135–145)
TRIGL SERPL-MCNC: 73 MG/DL (ref 0–149)

## 2023-09-13 PROCEDURE — 82570 ASSAY OF URINE CREATININE: CPT

## 2023-09-13 PROCEDURE — 80053 COMPREHEN METABOLIC PANEL: CPT

## 2023-09-13 PROCEDURE — 80061 LIPID PANEL: CPT

## 2023-09-13 PROCEDURE — 82043 UR ALBUMIN QUANTITATIVE: CPT

## 2023-09-13 PROCEDURE — 36415 COLL VENOUS BLD VENIPUNCTURE: CPT | Mod: GA

## 2023-09-13 PROCEDURE — 83036 HEMOGLOBIN GLYCOSYLATED A1C: CPT | Mod: GA

## 2023-09-18 ENCOUNTER — OFFICE VISIT (OUTPATIENT)
Dept: MEDICAL GROUP | Facility: MEDICAL CENTER | Age: 68
End: 2023-09-18
Payer: MEDICARE

## 2023-09-18 VITALS
DIASTOLIC BLOOD PRESSURE: 60 MMHG | SYSTOLIC BLOOD PRESSURE: 122 MMHG | HEART RATE: 59 BPM | HEIGHT: 65 IN | OXYGEN SATURATION: 95 % | RESPIRATION RATE: 17 BRPM | TEMPERATURE: 97.2 F | WEIGHT: 221 LBS | BODY MASS INDEX: 36.82 KG/M2

## 2023-09-18 DIAGNOSIS — R73.01 IFG (IMPAIRED FASTING GLUCOSE): ICD-10-CM

## 2023-09-18 DIAGNOSIS — E78.5 HYPERLIPIDEMIA LDL GOAL <70: ICD-10-CM

## 2023-09-18 DIAGNOSIS — M05.79 RHEUMATOID ARTHRITIS INVOLVING MULTIPLE SITES WITH POSITIVE RHEUMATOID FACTOR (HCC): ICD-10-CM

## 2023-09-18 PROCEDURE — 3074F SYST BP LT 130 MM HG: CPT | Performed by: NURSE PRACTITIONER

## 2023-09-18 PROCEDURE — 3078F DIAST BP <80 MM HG: CPT | Performed by: NURSE PRACTITIONER

## 2023-09-18 PROCEDURE — 99214 OFFICE O/P EST MOD 30 MIN: CPT | Performed by: NURSE PRACTITIONER

## 2023-09-18 ASSESSMENT — FIBROSIS 4 INDEX: FIB4 SCORE: 0.87

## 2023-09-18 NOTE — PROGRESS NOTES
Subjective:     Elizabeth Nuñez is a 68 y.o. female who presents with IFG.    HPI:   Seen in f/u for IFG.  She is feeling well.  She has been on healthy diet.  However she loves her candy and has a hard time with reducing her sugar intake.  Not exercising regularly.  She is on med for DM.  Reviewed lab with pt.  HgbA1C 5.8%,  that is significantly improved from previous at 6.5.   GFR, CMP, alb/cr ratio is wnl.  She is aware that she has not been the greatest with her diet and exercise.  LP shows trg and HDL at goal.  LDL IS ALMOST at goal at 75. Goal < 70 d/t her hx of MI.  Patient states . States . Patient aware that she needs to reduce her sugar. Says she is concerned to be starting on medication.  She is taking crestor approp.  States RA has improved. Not having much pain. she is followed by rheumatology.  Taking medications appropriately. X-rays and repeat lab in January. Sees rheum in February.    Reports SOB w/exertion with walking and when she runs to the appointment. Denies chest pain/pressure. Will continue to follow with Cardiology.   Denies pains otherwise. Patient would like to get her flu vaccine and COVID vaccine at [facility].     Patient Active Problem List    Diagnosis Date Noted    NSTEMI (non-ST elevated myocardial infarction) (HCC) 01/17/2023    Other microscopic colitis 01/17/2023    Sjogren's syndrome (HCC)     Rheumatoid arthritis (HCC)     IFG (impaired fasting glucose) 01/05/2019    Obesity (BMI 30-39.9) 07/13/2017    Allergy to pollen     Hypertension 02/22/2012    Syncope 05/20/2010    Hypothyroid 04/22/2009    Pulmonary nodule 04/22/2009    Hyperlipidemia LDL goal <70 04/22/2009    Pruritic dermatitis 04/22/2009    History of diverticulitis of colon 04/22/2009    Carpal tunnel syndrome 04/22/2009    Adenomatous polyp of colon 04/22/2009    GERD (gastroesophageal reflux disease) 04/22/2009       Current medicines (including changes today)  Current Outpatient Medications   Medication Sig  Dispense Refill    hydroxychloroquine (PLAQUENIL) 200 MG Tab Take 1 Tablet by mouth 2 times a day. 180 Tablet 3    rosuvastatin (CRESTOR) 5 MG Tab Take 1 Tablet by mouth every day. 90 Tablet 1    levothyroxine (SYNTHROID) 137 MCG Tab Take 1 tab daily 6x/wk and 1.5 tab daily 1x/wk 90 Tablet 3    acyclovir (ZOVIRAX) 400 MG tablet TAKE ONE TABLET BY MOUTH TWICE A  Tablet 1    dicyclomine (BENTYL) 10 MG Cap       lisinopril (PRINIVIL) 10 MG Tab Take 1 Tablet by mouth 2 times a day. TAKE 1 TABLET BY MOUTH TWICE A  Tablet 0    Magnesium 500 MG Cap       aspirin 81 MG EC tablet Take 81 mg by mouth every day.      clopidogrel (PLAVIX) 75 MG Tab Take 75 mg by mouth every day.      ketoconazole (NIZORAL) 2 % Cream Apply 1 Application topically 2 times a day. 30 g 1    albuterol 108 (90 Base) MCG/ACT Aero Soln inhalation aerosol Inhale 2 Puffs every 6 hours as needed for Shortness of Breath. 8.5 g 1    Calcium Carb-Cholecalciferol (CALCIUM 500 +D PO) Take 3 Tabs by mouth every evening.      B Complex Cap Take 1 Cap by mouth every day. 30 Cap 0    OMEPRAZOLE 20 MG PO CPDR Take 20 mg by mouth as needed (For heartburn).  11     No current facility-administered medications for this visit.       Allergies   Allergen Reactions    Misc. Sulfonamide Containing Compounds Hives and Itching    Morphine Hives and Itching    Sulfa Drugs Hives and Itching    Quinine Hives     Hives  Hives  Hives       ROS  Constitutional: Negative. Negative for fever, chills, weight loss, malaise/fatigue and diaphoresis.   HENT: Negative. Negative for hearing loss, ear pain, nosebleeds, congestion, sore throat, neck pain, tinnitus and ear discharge.   Respiratory: Negative. Negative for cough, hemoptysis, sputum production, shortness of breath, wheezing and stridor.   Cardiovascular: Negative. Negative for chest pain, palpitations, orthopnea, claudication, leg swelling and PND.   Gastrointestinal: Denies nausea, vomiting, diarrhea,  "constipation, heartburn, melena or hematochezia.  Genitourinary: Denies dysuria, hematuria, urinary incontinence, frequency or urgency.        Objective:     /60 (BP Location: Right arm, Patient Position: Sitting, BP Cuff Size: Adult)   Pulse (!) 59   Temp 36.2 °C (97.2 °F) (Temporal)   Resp 17   Ht 1.651 m (5' 5\")   Wt 100 kg (221 lb)   SpO2 95%  Body mass index is 36.78 kg/m².    Physical Exam:  Vitals reviewed.  Constitutional: Oriented to person, place, and time. appears well-developed and well-nourished. No distress.   Cardiovascular: Normal rate, regular rhythm, normal heart sounds and intact distal pulses. Exam reveals no gallop and no friction rub. No murmur heard. No carotid bruits.   Pulmonary/Chest: Effort normal and breath sounds normal. No stridor. No respiratory distress. no wheezes or rales. exhibits no tenderness.   Musculoskeletal: Normal range of motion. exhibits no edema. norma pedal pulses 2+.  Lymphadenopathy: No cervical or supraclavicular adenopathy.   Neurological: Alert and oriented to person, place, and time. exhibits normal muscle tone.  Skin: Skin is warm and dry. No diaphoresis.   Psychiatric: Normal mood and affect. Behavior is normal.      Assessment and Plan:     The following treatment plan was discussed:    1. Hyperlipidemia LDL goal <70  Comp Metabolic Panel    Lipid Profile    Patient states she is well aware that she is not the greatest at keeping a healthy diet and exercise. Repeat lipid labs for January ordered.      2. IFG (impaired fasting glucose)  HEMOGLOBIN A1C    Hgb A1C 5.8%. Reports she has not been well compliant with a healthy diet and exercise. Patient states she will work on diet and exercise. Repeat labs for Jan.      3. Rheumatoid arthritis involving multiple sites with positive rheumatoid factor (HCC)      stable. followed by rheumatology            Followup: Return in about 4 months (around 1/18/2024), or for lab review.  "

## 2023-10-26 DIAGNOSIS — B02.8 HERPES ZOSTER WITH COMPLICATION: ICD-10-CM

## 2023-10-28 DIAGNOSIS — B02.8 HERPES ZOSTER WITH COMPLICATION: ICD-10-CM

## 2023-10-28 RX ORDER — ACYCLOVIR 400 MG/1
400 TABLET ORAL 2 TIMES DAILY
Qty: 180 TABLET | Refills: 0 | Status: SHIPPED | OUTPATIENT
Start: 2023-10-28 | End: 2024-01-24

## 2023-10-28 RX ORDER — ACYCLOVIR 400 MG/1
400 TABLET ORAL 2 TIMES DAILY
Qty: 180 TABLET | Refills: 0 | Status: SHIPPED | OUTPATIENT
Start: 2023-10-28 | End: 2024-01-19

## 2023-10-28 NOTE — TELEPHONE ENCOUNTER
Received request via: Pharmacy    Was the patient seen in the last year in this department? Yes    Does the patient have an active prescription (recently filled or refills available) for medication(s) requested? yes    Does the patient have [facility] and need 100 day supply (blood pressure, diabetes and cholesterol meds only)? Patient does not have SCP   Requested Prescriptions     Pending Prescriptions Disp Refills    acyclovir (ZOVIRAX) 400 MG tablet 180 Tablet 1     Sig: Take 1 Tablet by mouth 2 times a day.

## 2024-01-16 ENCOUNTER — OFFICE VISIT (OUTPATIENT)
Dept: MEDICAL GROUP | Facility: MEDICAL CENTER | Age: 69
End: 2024-01-16
Payer: MEDICARE

## 2024-01-16 VITALS
TEMPERATURE: 97.3 F | HEIGHT: 65 IN | DIASTOLIC BLOOD PRESSURE: 62 MMHG | WEIGHT: 217.25 LBS | SYSTOLIC BLOOD PRESSURE: 118 MMHG | HEART RATE: 55 BPM | BODY MASS INDEX: 36.19 KG/M2 | OXYGEN SATURATION: 98 %

## 2024-01-16 DIAGNOSIS — Z91.09 ALLERGY TO POLLEN: ICD-10-CM

## 2024-01-16 DIAGNOSIS — Z86.19 HISTORY OF SHINGLES: ICD-10-CM

## 2024-01-16 DIAGNOSIS — R42 VERTIGO: ICD-10-CM

## 2024-01-16 DIAGNOSIS — R11.0 NAUSEA: ICD-10-CM

## 2024-01-16 PROCEDURE — 3074F SYST BP LT 130 MM HG: CPT | Performed by: NURSE PRACTITIONER

## 2024-01-16 PROCEDURE — 3078F DIAST BP <80 MM HG: CPT | Performed by: NURSE PRACTITIONER

## 2024-01-16 PROCEDURE — 99214 OFFICE O/P EST MOD 30 MIN: CPT | Performed by: NURSE PRACTITIONER

## 2024-01-16 RX ORDER — MONTELUKAST SODIUM 4 MG/1
4 TABLET, CHEWABLE ORAL NIGHTLY
Qty: 30 TABLET | Refills: 1 | Status: SHIPPED | OUTPATIENT
Start: 2024-01-16 | End: 2024-01-24

## 2024-01-16 RX ORDER — SCOLOPAMINE TRANSDERMAL SYSTEM 1 MG/1
1 PATCH, EXTENDED RELEASE TRANSDERMAL
Qty: 4 PATCH | Refills: 3 | Status: SHIPPED | OUTPATIENT
Start: 2024-01-16 | End: 2024-03-13 | Stop reason: SDUPTHER

## 2024-01-16 ASSESSMENT — PATIENT HEALTH QUESTIONNAIRE - PHQ9: CLINICAL INTERPRETATION OF PHQ2 SCORE: 0

## 2024-01-16 ASSESSMENT — ENCOUNTER SYMPTOMS: NAUSEA: 1

## 2024-01-16 ASSESSMENT — FIBROSIS 4 INDEX: FIB4 SCORE: 0.87

## 2024-01-16 NOTE — PROGRESS NOTES
Subjective     Elizabeth Nuñez is a 68 y.o. female who presents with Nausea and Other (Vertigo)    Seen today for vertigo.  Symptoms started one week ago x1 week.   Previous episode in 2020. She went to the ED and was told it was BPPV. She was given Meclazine and it did not help. She saw an ENT provider shortly after and was put on Clonipin for severe dizziness. She says the clonipin helped.   She says that when she bends down to grab something she feels very dizzy. Worse upon awakening, and when she goes from lying to standing. She sits up, waits before she starts walking and then the symptoms are not resolved. She says she cannot walk a straight line. She took Zytec in hopes that it would help and it did not. Some nausea. Denies vomiting and syncope. She says she has not called ENT to make an appointment because she wanted to know what route to take. Soonest available apt is in late January/February.     She has a history of shingles and takes daily acyclovir. She has been having intermittent left eye pain, headaches and ear pain. She noticed a break out on her left cheek and it may have been related to her symptoms but the symptoms have not resolved. She notices that when she is swimming, submerges her ears, and she feels like she gets the symptoms. She says her vision feels off. She feels like she has a foggy brain and she cannot concentrate. enies recent illness, congestion, sinus pressure.      Nausea  Associated symptoms include nausea.   Other  Associated symptoms include nausea.     ROS  Constitutional: Negative. Negative for fever, chills, weight loss, malaise/fatigue and diaphoresis.   HENT: Negative. Negative for hearing loss, nosebleeds, sore throat, neck pain, tinnitus and ear discharge. +head congestion.  Respiratory: Negative. Negative for cough, hemoptysis, sputum production, shortness of breath, wheezing and stridor.   Cardiovascular: Negative. Negative for chest pain, palpitations,  orthopnea, claudication, leg swelling and PND.   Gastrointestinal: Denies nausea, vomiting, diarrhea, constipation, heartburn, melena or hematochezia.  Genitourinary: Denies dysuria, hematuria, urinary incontinence, frequency or urgency.     Patient Active Problem List   Diagnosis    Hypothyroid    Pulmonary nodule    Hyperlipidemia LDL goal <70    Pruritic dermatitis    History of diverticulitis of colon    Carpal tunnel syndrome    Adenomatous polyp of colon    GERD (gastroesophageal reflux disease)    Syncope    Hypertension    Allergy to pollen    Obesity (BMI 30-39.9)    IFG (impaired fasting glucose)    Sjogren's syndrome (HCC)    Rheumatoid arthritis (HCC)    NSTEMI (non-ST elevated myocardial infarction) (HCC)    Other microscopic colitis       Allergies   Allergen Reactions    Misc. Sulfonamide Containing Compounds Hives and Itching    Morphine Hives and Itching    Sulfa Drugs Hives and Itching    Quinine Hives     Hives  Hives  Hives       Current Outpatient Medications:     scopolamine (TRANSDERM SCOP, 1.5 MG,) 1 mg/72hr PATCH 72 HR, Place 1 Patch on the skin every 72 hours., Disp: 4 Patch, Rfl: 3    acyclovir (ZOVIRAX) 400 MG tablet, Take 1 Tablet by mouth 2 times a day., Disp: 180 Tablet, Rfl: 0    acyclovir (ZOVIRAX) 400 MG tablet, Take 1 Tablet by mouth 2 times a day., Disp: 180 Tablet, Rfl: 0    lisinopril (PRINIVIL) 10 MG Tab, TAKE ONE TABLET BY MOUTH TWICE A DAY, Disp: 200 Tablet, Rfl: 2    hydroxychloroquine (PLAQUENIL) 200 MG Tab, Take 1 Tablet by mouth 2 times a day., Disp: 180 Tablet, Rfl: 3    rosuvastatin (CRESTOR) 5 MG Tab, Take 1 Tablet by mouth every day., Disp: 90 Tablet, Rfl: 1    levothyroxine (SYNTHROID) 137 MCG Tab, Take 1 tab daily 6x/wk and 1.5 tab daily 1x/wk, Disp: 90 Tablet, Rfl: 3    dicyclomine (BENTYL) 10 MG Cap, , Disp: , Rfl:     Magnesium 500 MG Cap, , Disp: , Rfl:     aspirin 81 MG EC tablet, Take 81 mg by mouth every day., Disp: , Rfl:     ketoconazole (NIZORAL) 2 %  "Cream, Apply 1 Application topically 2 times a day., Disp: 30 g, Rfl: 1    albuterol 108 (90 Base) MCG/ACT Aero Soln inhalation aerosol, Inhale 2 Puffs every 6 hours as needed for Shortness of Breath., Disp: 8.5 g, Rfl: 1    Calcium Carb-Cholecalciferol (CALCIUM 500 +D PO), Take 3 Tabs by mouth every evening., Disp: , Rfl:     B Complex Cap, Take 1 Cap by mouth every day., Disp: 30 Cap, Rfl: 0    OMEPRAZOLE 20 MG PO CPDR, Take 20 mg by mouth as needed (For heartburn)., Disp: , Rfl: 11           Objective     /62 (BP Location: Left arm, Patient Position: Sitting, BP Cuff Size: Adult)   Pulse (!) 55   Temp 36.3 °C (97.3 °F) (Temporal)   Ht 1.651 m (5' 5\")   Wt 98.5 kg (217 lb 4 oz)   LMP 08/11/2010   SpO2 98%   BMI 36.15 kg/m²      Physical Exam    Vitals reviewed.  Constitutional: Oriented to person, place, and time. appears well-developed and well-nourished. No distress.   Cardiovascular: Normal rate, regular rhythm, normal heart sounds and intact distal pulses. Exam reveals no gallop and no friction rub. No murmur heard. No carotid bruits.   Pulmonary/Chest: Effort normal and breath sounds normal. No stridor. No respiratory distress. no wheezes or rales. exhibits no tenderness.   Musculoskeletal: Normal range of motion. exhibits no edema. norma pedal pulses 2+.  Lymphadenopathy: No cervical or supraclavicular adenopathy.   Neurological: Alert and oriented to person, place, and time. exhibits normal muscle tone.  Skin: Skin is warm and dry. No diaphoresis.   Psychiatric: Normal mood and affect. Behavior is normal.    Assessment & Plan     1. Vertigo  scopolamine (TRANSDERM SCOP, 1.5 MG,) 1 mg/72hr PATCH 72 HR    Referral to Physical Therapy    montelukast (SINGULAIR) 4 MG Chew Tab    Placed referral to ENT. Ordered scopalamine patch. Pt to f/u if symptoms unchanged.      2. Nausea      Ordered scopalamine patch. Cont to monitor.      3. Benign paroxysmal positional vertigo due to bilateral vestibular " disorder      Placed referral to ENT. Ordered scopalamine patch. Pt to f/u if symptoms unchanged.      4. History of shingles      On daily acyclovir. Tolerating well. Cont to monitor.      5. Allergy to pollen      Cont w/ Zyrtec. Ordered montelukast. Pt to f/u if symptoms unchanged.        Follow up:   2 months for lab review.

## 2024-01-20 NOTE — PROGRESS NOTES
Subjective:     Elizabeth Nuñez is a 68 y.o. female who presents with vertigo.    HPI:   Seen in f/u for vertigo  Symptoms started one week ago.  Previous episode in 2020. She went to the ED and was told it was BPPV. She was given Meclazine and it did not help. She saw an ENT provider shortly after and was put on Clonipin for severe dizziness. She says the clonipin helped.  sx occur when she bends down to grab something she feels very dizzy. Worse upon awakening, and when she goes from lying to standing. She sits up, waits before she starts walking and then the symptoms are not resolved. She says she cannot walk a straight line. She took Zytec in hopes that it would help and it did not. Some nausea. Denies vomiting and syncope. She says she has not called ENT to make an appointment because she wanted to know what route to take. Soonest available apt is in late January/February.   She has a history of shingles and takes daily acyclovir and allergies. She has been having intermittent left eye pain, headaches and ear pain. She noticed a break out on her left cheek and it may have been related but the symptoms have not resolved. She notices allergy and dizziness sx when she is swimming, submerges her ears, and she feels like she gets the symptoms. She says her vision feels off. She feels like she has a foggy brain and she cannot concentrate. enies recent illness, congestion, sinus pressure.    Patient Active Problem List    Diagnosis Date Noted    NSTEMI (non-ST elevated myocardial infarction) (HCC) 01/17/2023    Other microscopic colitis 01/17/2023    Sjogren's syndrome (HCC)     Rheumatoid arthritis ([facility])     IFG (impaired fasting glucose) 01/05/2019    Obesity (BMI 30-39.9) 07/13/2017    Allergy to pollen     Hypertension 02/22/2012    Syncope 05/20/2010    Hypothyroid 04/22/2009    Pulmonary nodule 04/22/2009    Hyperlipidemia LDL goal <70 04/22/2009    Pruritic dermatitis 04/22/2009    History of  diverticulitis of colon 04/22/2009    Carpal tunnel syndrome 04/22/2009    Adenomatous polyp of colon 04/22/2009    GERD (gastroesophageal reflux disease) 04/22/2009       Current medicines (including changes today)  Current Outpatient Medications   Medication Sig Dispense Refill    scopolamine (TRANSDERM SCOP, 1.5 MG,) 1 mg/72hr PATCH 72 HR Place 1 Patch on the skin every 72 hours. 4 Patch 3    montelukast (SINGULAIR) 4 MG Chew Tab Chew 1 Tablet every evening. 30 Tablet 1    acyclovir (ZOVIRAX) 400 MG tablet Take 1 Tablet by mouth 2 times a day. 180 Tablet 0    acyclovir (ZOVIRAX) 400 MG tablet Take 1 Tablet by mouth 2 times a day. 180 Tablet 0    lisinopril (PRINIVIL) 10 MG Tab TAKE ONE TABLET BY MOUTH TWICE A  Tablet 2    hydroxychloroquine (PLAQUENIL) 200 MG Tab Take 1 Tablet by mouth 2 times a day. 180 Tablet 3    rosuvastatin (CRESTOR) 5 MG Tab Take 1 Tablet by mouth every day. 90 Tablet 1    levothyroxine (SYNTHROID) 137 MCG Tab Take 1 tab daily 6x/wk and 1.5 tab daily 1x/wk 90 Tablet 3    dicyclomine (BENTYL) 10 MG Cap       Magnesium 500 MG Cap       aspirin 81 MG EC tablet Take 81 mg by mouth every day.      ketoconazole (NIZORAL) 2 % Cream Apply 1 Application topically 2 times a day. 30 g 1    albuterol 108 (90 Base) MCG/ACT Aero Soln inhalation aerosol Inhale 2 Puffs every 6 hours as needed for Shortness of Breath. 8.5 g 1    Calcium Carb-Cholecalciferol (CALCIUM 500 +D PO) Take 3 Tabs by mouth every evening.      B Complex Cap Take 1 Cap by mouth every day. 30 Cap 0    OMEPRAZOLE 20 MG PO CPDR Take 20 mg by mouth as needed (For heartburn).  11     No current facility-administered medications for this visit.       Allergies   Allergen Reactions    Misc. Sulfonamide Containing Compounds Hives and Itching    Morphine Hives and Itching    Sulfa Drugs Hives and Itching    Quinine Hives     Hives  Hives  Hives       ROS  Constitutional: Negative. Negative for fever, chills, weight loss, malaise/fatigue  "and diaphoresis.   HENT: Negative. Negative for hearing loss, ear pain, nosebleeds, congestion, sore throat, neck pain, tinnitus and ear discharge.   Respiratory: Negative. Negative for cough, hemoptysis, sputum production, shortness of breath, wheezing and stridor.   Cardiovascular: Negative. Negative for chest pain, palpitations, orthopnea, claudication, leg swelling and PND.   Gastrointestinal: Denies nausea, vomiting, diarrhea, constipation, heartburn, melena or hematochezia.  Genitourinary: Denies dysuria, hematuria, urinary incontinence, frequency or urgency.        Objective:     /62 (BP Location: Left arm, Patient Position: Sitting, BP Cuff Size: Adult)   Pulse (!) 55   Temp 36.3 °C (97.3 °F) (Temporal)   Ht 1.651 m (5' 5\")   Wt 98.5 kg (217 lb 4 oz)   SpO2 98%  Body mass index is 36.15 kg/m².    Physical Exam:  Vitals reviewed.  Constitutional: Oriented to person, place, and time. appears well-developed and well-nourished. No distress.   Cardiovascular: Normal rate, regular rhythm, normal heart sounds and intact distal pulses. Exam reveals no gallop and no friction rub. No murmur heard. No carotid bruits.   Pulmonary/Chest: Effort normal and breath sounds normal. No stridor. No respiratory distress. no wheezes or rales. exhibits no tenderness.   Musculoskeletal: Normal range of motion. exhibits no edema. norma pedal pulses 2+.  Lymphadenopathy: No cervical or supraclavicular adenopathy.   Neurological: Alert and oriented to person, place, and time. exhibits normal muscle tone.  Skin: Skin is warm and dry. No diaphoresis.   Psychiatric: Normal mood and affect. Behavior is normal.      Assessment and Plan:     The following treatment plan was discussed:    1. Vertigo  scopolamine (TRANSDERM SCOP, 1.5 MG,) 1 mg/72hr PATCH 72 HR    Referral to Physical Therapy    montelukast (SINGULAIR) 4 MG Chew Tab    Placed referral to ENT. Ordered scopalamine patch. Pt to f/u if symptoms unchanged.      2. Nausea   "    Ordered scopalamine patch. Cont to monitor.      3. History of shingles      On daily acyclovir. Tolerating well. Cont to monitor.      4. Allergy to pollen      Cont w/ Zyrtec. Ordered montelukast. Pt to f/u if symptoms unchanged.            Followup: Return in about 2 months (around 3/16/2024), or lab review.

## 2024-01-24 DIAGNOSIS — B02.8 HERPES ZOSTER WITH COMPLICATION: ICD-10-CM

## 2024-01-24 DIAGNOSIS — E78.5 HYPERLIPIDEMIA LDL GOAL <100: ICD-10-CM

## 2024-01-24 RX ORDER — ACYCLOVIR 400 MG/1
400 TABLET ORAL 2 TIMES DAILY
Qty: 180 TABLET | Refills: 0 | Status: SHIPPED | OUTPATIENT
Start: 2024-01-24 | End: 2024-01-27

## 2024-01-24 RX ORDER — MONTELUKAST SODIUM 10 MG/1
10 TABLET ORAL DAILY
Qty: 30 TABLET | Refills: 3 | Status: SHIPPED | OUTPATIENT
Start: 2024-01-24 | End: 2024-03-13

## 2024-01-24 RX ORDER — ROSUVASTATIN CALCIUM 5 MG/1
5 TABLET, COATED ORAL DAILY
Qty: 90 TABLET | Refills: 1 | Status: SHIPPED | OUTPATIENT
Start: 2024-01-24 | End: 2024-03-13

## 2024-01-24 RX ADMIN — TRIAMCINOLONE ACETONIDE THIN LAYER: 1 CREAM TOPICAL at 00:00

## 2024-01-25 ENCOUNTER — APPOINTMENT (RX ONLY)
Dept: URBAN - METROPOLITAN AREA CLINIC 35 | Facility: CLINIC | Age: 69
Setting detail: DERMATOLOGY
End: 2024-01-25

## 2024-01-25 DIAGNOSIS — B02.8 HERPES ZOSTER WITH COMPLICATION: ICD-10-CM

## 2024-01-25 DIAGNOSIS — L82.0 INFLAMED SEBORRHEIC KERATOSIS: ICD-10-CM

## 2024-01-25 DIAGNOSIS — L30.8 OTHER SPECIFIED DERMATITIS: ICD-10-CM

## 2024-01-25 PROCEDURE — ? COUNSELING

## 2024-01-25 PROCEDURE — ? PRESCRIPTION

## 2024-01-25 PROCEDURE — ? TREATMENT REGIMEN

## 2024-01-25 PROCEDURE — 99213 OFFICE O/P EST LOW 20 MIN: CPT

## 2024-01-25 RX ORDER — TRIAMCINOLONE ACETONIDE 1 MG/G
THIN LAYER CREAM TOPICAL BID
Qty: 80 | Refills: 1 | Status: ERX | COMMUNITY
Start: 2024-01-24

## 2024-01-25 ASSESSMENT — LOCATION ZONE DERM
LOCATION ZONE: EAR
LOCATION ZONE: HAND

## 2024-01-25 ASSESSMENT — LOCATION SIMPLE DESCRIPTION DERM
LOCATION SIMPLE: RIGHT EAR
LOCATION SIMPLE: RIGHT HAND
LOCATION SIMPLE: LEFT HAND

## 2024-01-25 ASSESSMENT — LOCATION DETAILED DESCRIPTION DERM
LOCATION DETAILED: RIGHT HYPOTHENAR EMINENCE
LOCATION DETAILED: LEFT HYPOTHENAR EMINENCE
LOCATION DETAILED: RIGHT INFERIOR CRUS OF ANTIHELIX

## 2024-01-25 NOTE — TELEPHONE ENCOUNTER
Received request via: Pharmacy    Was the patient seen in the last year in this department? Yes    Does the patient have an active prescription (recently filled or refills available) for medication(s) requested? No    Pharmacy Name: [facility]     Does the patient have [facility] and need 100 day supply (blood pressure, diabetes and cholesterol meds only)? Patient does not have SCP

## 2024-01-25 NOTE — TELEPHONE ENCOUNTER
Received request via: Pharmacy    Was the patient seen in the last year in this department? Yes    Does the patient have an active prescription (recently filled or refills available) for medication(s) requested? No    Pharmacy Name: [facility]     Does the patient have [facility] and need 100 day supply (blood pressure, diabetes and cholesterol meds only)? Patient does not have SCP

## 2024-01-25 NOTE — PROCEDURE: TREATMENT REGIMEN
Action 2: Continue
Show Aquaphor Line: Yes
Detail Level: Zone
Initiate Regimen: Triamcinolone 0.1% bid for 2 weeks alternating with 2 weeks off

## 2024-01-27 RX ORDER — ACYCLOVIR 400 MG/1
400 TABLET ORAL 2 TIMES DAILY
Qty: 180 TABLET | Refills: 0 | Status: SHIPPED | OUTPATIENT
Start: 2024-01-27 | End: 2024-03-13 | Stop reason: SDUPTHER

## 2024-01-30 ENCOUNTER — HOSPITAL ENCOUNTER (OUTPATIENT)
Dept: RADIOLOGY | Facility: MEDICAL CENTER | Age: 69
End: 2024-01-30
Attending: INTERNAL MEDICINE
Payer: MEDICARE

## 2024-01-30 DIAGNOSIS — M05.79 RHEUMATOID ARTHRITIS INVOLVING MULTIPLE SITES WITH POSITIVE RHEUMATOID FACTOR (HCC): ICD-10-CM

## 2024-01-30 PROCEDURE — 73130 X-RAY EXAM OF HAND: CPT | Mod: RT

## 2024-01-30 PROCEDURE — 73630 X-RAY EXAM OF FOOT: CPT | Mod: RT

## 2024-01-30 PROCEDURE — 73130 X-RAY EXAM OF HAND: CPT | Mod: LT

## 2024-01-30 PROCEDURE — 73630 X-RAY EXAM OF FOOT: CPT | Mod: LT

## 2024-01-31 ENCOUNTER — HOSPITAL ENCOUNTER (OUTPATIENT)
Dept: LAB | Facility: MEDICAL CENTER | Age: 69
End: 2024-01-31
Attending: INTERNAL MEDICINE
Payer: MEDICARE

## 2024-01-31 DIAGNOSIS — M05.79 RHEUMATOID ARTHRITIS INVOLVING MULTIPLE SITES WITH POSITIVE RHEUMATOID FACTOR (HCC): ICD-10-CM

## 2024-01-31 DIAGNOSIS — M35.00 SJOGREN'S SYNDROME, WITH UNSPECIFIED ORGAN INVOLVEMENT (HCC): ICD-10-CM

## 2024-01-31 LAB
ALBUMIN SERPL BCP-MCNC: 3.9 G/DL (ref 3.2–4.9)
ALBUMIN/GLOB SERPL: 1.4 G/DL
ALP SERPL-CCNC: 63 U/L (ref 30–99)
ALT SERPL-CCNC: 18 U/L (ref 2–50)
ANION GAP SERPL CALC-SCNC: 11 MMOL/L (ref 7–16)
APPEARANCE UR: CLEAR
AST SERPL-CCNC: 15 U/L (ref 12–45)
BASOPHILS # BLD AUTO: 0.5 % (ref 0–1.8)
BASOPHILS # BLD: 0.03 K/UL (ref 0–0.12)
BILIRUB SERPL-MCNC: 0.6 MG/DL (ref 0.1–1.5)
BILIRUB UR QL STRIP.AUTO: NEGATIVE
BUN SERPL-MCNC: 15 MG/DL (ref 8–22)
C3 SERPL-MCNC: 118.5 MG/DL (ref 87–200)
C4 SERPL-MCNC: 20.8 MG/DL (ref 19–52)
CALCIUM ALBUM COR SERPL-MCNC: 9.3 MG/DL (ref 8.5–10.5)
CALCIUM SERPL-MCNC: 9.2 MG/DL (ref 8.4–10.2)
CHLORIDE SERPL-SCNC: 101 MMOL/L (ref 96–112)
CO2 SERPL-SCNC: 26 MMOL/L (ref 20–33)
COLOR UR: YELLOW
CREAT SERPL-MCNC: 0.61 MG/DL (ref 0.5–1.4)
CRP SERPL HS-MCNC: 0.65 MG/DL (ref 0–0.75)
EOSINOPHIL # BLD AUTO: 0.16 K/UL (ref 0–0.51)
EOSINOPHIL NFR BLD: 2.5 % (ref 0–6.9)
ERYTHROCYTE [DISTWIDTH] IN BLOOD BY AUTOMATED COUNT: 46.4 FL (ref 35.9–50)
ERYTHROCYTE [SEDIMENTATION RATE] IN BLOOD BY WESTERGREN METHOD: 6 MM/HOUR (ref 0–25)
GFR SERPLBLD CREATININE-BSD FMLA CKD-EPI: 98 ML/MIN/1.73 M 2
GLOBULIN SER CALC-MCNC: 2.7 G/DL (ref 1.9–3.5)
GLUCOSE SERPL-MCNC: 100 MG/DL (ref 65–99)
GLUCOSE UR STRIP.AUTO-MCNC: NEGATIVE MG/DL
HCT VFR BLD AUTO: 43.3 % (ref 37–47)
HGB BLD-MCNC: 14.4 G/DL (ref 12–16)
IMM GRANULOCYTES # BLD AUTO: 0.01 K/UL (ref 0–0.11)
IMM GRANULOCYTES NFR BLD AUTO: 0.2 % (ref 0–0.9)
KETONES UR STRIP.AUTO-MCNC: NEGATIVE MG/DL
LEUKOCYTE ESTERASE UR QL STRIP.AUTO: NEGATIVE
LYMPHOCYTES # BLD AUTO: 1.75 K/UL (ref 1–4.8)
LYMPHOCYTES NFR BLD: 27 % (ref 22–41)
MCH RBC QN AUTO: 30 PG (ref 27–33)
MCHC RBC AUTO-ENTMCNC: 33.3 G/DL (ref 32.2–35.5)
MCV RBC AUTO: 90.2 FL (ref 81.4–97.8)
MICRO URNS: NORMAL
MONOCYTES # BLD AUTO: 0.59 K/UL (ref 0–0.85)
MONOCYTES NFR BLD AUTO: 9.1 % (ref 0–13.4)
NEUTROPHILS # BLD AUTO: 3.94 K/UL (ref 1.82–7.42)
NEUTROPHILS NFR BLD: 60.7 % (ref 44–72)
NITRITE UR QL STRIP.AUTO: NEGATIVE
NRBC # BLD AUTO: 0 K/UL
NRBC BLD-RTO: 0 /100 WBC (ref 0–0.2)
PH UR STRIP.AUTO: 7 [PH] (ref 5–8)
PLATELET # BLD AUTO: 229 K/UL (ref 164–446)
PMV BLD AUTO: 9.6 FL (ref 9–12.9)
POTASSIUM SERPL-SCNC: 4.5 MMOL/L (ref 3.6–5.5)
PROT SERPL-MCNC: 6.6 G/DL (ref 6–8.2)
PROT UR QL STRIP: NEGATIVE MG/DL
RBC # BLD AUTO: 4.8 M/UL (ref 4.2–5.4)
RBC UR QL AUTO: NEGATIVE
SODIUM SERPL-SCNC: 138 MMOL/L (ref 135–145)
SP GR UR STRIP.AUTO: 1.01
WBC # BLD AUTO: 6.5 K/UL (ref 4.8–10.8)

## 2024-01-31 PROCEDURE — 86140 C-REACTIVE PROTEIN: CPT

## 2024-01-31 PROCEDURE — 85652 RBC SED RATE AUTOMATED: CPT

## 2024-01-31 PROCEDURE — 86160 COMPLEMENT ANTIGEN: CPT

## 2024-01-31 PROCEDURE — 85025 COMPLETE CBC W/AUTO DIFF WBC: CPT

## 2024-01-31 PROCEDURE — 80053 COMPREHEN METABOLIC PANEL: CPT

## 2024-01-31 PROCEDURE — 36415 COLL VENOUS BLD VENIPUNCTURE: CPT

## 2024-01-31 PROCEDURE — 81003 URINALYSIS AUTO W/O SCOPE: CPT

## 2024-02-01 ENCOUNTER — APPOINTMENT (OUTPATIENT)
Dept: MEDICAL GROUP | Facility: MEDICAL CENTER | Age: 69
End: 2024-02-01
Payer: MEDICARE

## 2024-02-03 ENCOUNTER — APPOINTMENT (OUTPATIENT)
Dept: LAB | Facility: MEDICAL CENTER | Age: 69
End: 2024-02-03
Payer: MEDICARE

## 2024-02-08 ENCOUNTER — APPOINTMENT (OUTPATIENT)
Dept: MEDICAL GROUP | Facility: MEDICAL CENTER | Age: 69
End: 2024-02-08
Payer: MEDICARE

## 2024-03-08 ENCOUNTER — HOSPITAL ENCOUNTER (OUTPATIENT)
Dept: LAB | Facility: MEDICAL CENTER | Age: 69
End: 2024-03-08
Attending: INTERNAL MEDICINE
Payer: MEDICARE

## 2024-03-08 LAB
ALBUMIN SERPL BCP-MCNC: 3.8 G/DL (ref 3.2–4.9)
ALBUMIN/GLOB SERPL: 1.1 G/DL
ALP SERPL-CCNC: 74 U/L (ref 30–99)
ALT SERPL-CCNC: 22 U/L (ref 2–50)
ANION GAP SERPL CALC-SCNC: 9 MMOL/L (ref 7–16)
AST SERPL-CCNC: 19 U/L (ref 12–45)
BASOPHILS # BLD AUTO: 0.8 % (ref 0–1.8)
BASOPHILS # BLD: 0.04 K/UL (ref 0–0.12)
BILIRUB SERPL-MCNC: 0.7 MG/DL (ref 0.1–1.5)
BUN SERPL-MCNC: 14 MG/DL (ref 8–22)
CALCIUM ALBUM COR SERPL-MCNC: 9.4 MG/DL (ref 8.5–10.5)
CALCIUM SERPL-MCNC: 9.2 MG/DL (ref 8.4–10.2)
CHLORIDE SERPL-SCNC: 104 MMOL/L (ref 96–112)
CHOLEST SERPL-MCNC: 156 MG/DL (ref 100–199)
CO2 SERPL-SCNC: 26 MMOL/L (ref 20–33)
CREAT SERPL-MCNC: 0.58 MG/DL (ref 0.5–1.4)
EOSINOPHIL # BLD AUTO: 0.17 K/UL (ref 0–0.51)
EOSINOPHIL NFR BLD: 3.3 % (ref 0–6.9)
ERYTHROCYTE [DISTWIDTH] IN BLOOD BY AUTOMATED COUNT: 46.7 FL (ref 35.9–50)
EST. AVERAGE GLUCOSE BLD GHB EST-MCNC: 128 MG/DL
FASTING STATUS PATIENT QL REPORTED: NORMAL
GFR SERPLBLD CREATININE-BSD FMLA CKD-EPI: 99 ML/MIN/1.73 M 2
GLOBULIN SER CALC-MCNC: 3.4 G/DL (ref 1.9–3.5)
GLUCOSE SERPL-MCNC: 87 MG/DL (ref 65–99)
HBA1C MFR BLD: 6.1 % (ref 4–5.6)
HCT VFR BLD AUTO: 47 % (ref 37–47)
HDLC SERPL-MCNC: 55 MG/DL
HGB BLD-MCNC: 15.6 G/DL (ref 12–16)
IMM GRANULOCYTES # BLD AUTO: 0.01 K/UL (ref 0–0.11)
IMM GRANULOCYTES NFR BLD AUTO: 0.2 % (ref 0–0.9)
LDLC SERPL CALC-MCNC: 83 MG/DL
LYMPHOCYTES # BLD AUTO: 1.37 K/UL (ref 1–4.8)
LYMPHOCYTES NFR BLD: 27 % (ref 22–41)
MCH RBC QN AUTO: 30.4 PG (ref 27–33)
MCHC RBC AUTO-ENTMCNC: 33.2 G/DL (ref 32.2–35.5)
MCV RBC AUTO: 91.4 FL (ref 81.4–97.8)
MONOCYTES # BLD AUTO: 0.6 K/UL (ref 0–0.85)
MONOCYTES NFR BLD AUTO: 11.8 % (ref 0–13.4)
NEUTROPHILS # BLD AUTO: 2.89 K/UL (ref 1.82–7.42)
NEUTROPHILS NFR BLD: 56.9 % (ref 44–72)
NRBC # BLD AUTO: 0 K/UL
NRBC BLD-RTO: 0 /100 WBC (ref 0–0.2)
PLATELET # BLD AUTO: 238 K/UL (ref 164–446)
PMV BLD AUTO: 9.9 FL (ref 9–12.9)
POTASSIUM SERPL-SCNC: 4.4 MMOL/L (ref 3.6–5.5)
PROT SERPL-MCNC: 7.2 G/DL (ref 6–8.2)
RBC # BLD AUTO: 5.14 M/UL (ref 4.2–5.4)
SODIUM SERPL-SCNC: 139 MMOL/L (ref 135–145)
TRIGL SERPL-MCNC: 88 MG/DL (ref 0–149)
WBC # BLD AUTO: 5.1 K/UL (ref 4.8–10.8)

## 2024-03-08 PROCEDURE — 36415 COLL VENOUS BLD VENIPUNCTURE: CPT

## 2024-03-08 PROCEDURE — 85025 COMPLETE CBC W/AUTO DIFF WBC: CPT

## 2024-03-08 PROCEDURE — 80061 LIPID PANEL: CPT

## 2024-03-08 PROCEDURE — 83036 HEMOGLOBIN GLYCOSYLATED A1C: CPT | Mod: GA

## 2024-03-08 PROCEDURE — 80053 COMPREHEN METABOLIC PANEL: CPT

## 2024-03-11 SDOH — ECONOMIC STABILITY: FOOD INSECURITY: WITHIN THE PAST 12 MONTHS, YOU WORRIED THAT YOUR FOOD WOULD RUN OUT BEFORE YOU GOT MONEY TO BUY MORE.: NEVER TRUE

## 2024-03-11 SDOH — HEALTH STABILITY: PHYSICAL HEALTH: ON AVERAGE, HOW MANY DAYS PER WEEK DO YOU ENGAGE IN MODERATE TO STRENUOUS EXERCISE (LIKE A BRISK WALK)?: 3 DAYS

## 2024-03-11 SDOH — ECONOMIC STABILITY: FOOD INSECURITY: WITHIN THE PAST 12 MONTHS, THE FOOD YOU BOUGHT JUST DIDN'T LAST AND YOU DIDN'T HAVE MONEY TO GET MORE.: NEVER TRUE

## 2024-03-11 SDOH — ECONOMIC STABILITY: HOUSING INSECURITY: IN THE LAST 12 MONTHS, HOW MANY PLACES HAVE YOU LIVED?: 1

## 2024-03-11 SDOH — ECONOMIC STABILITY: INCOME INSECURITY: HOW HARD IS IT FOR YOU TO PAY FOR THE VERY BASICS LIKE FOOD, HOUSING, MEDICAL CARE, AND HEATING?: NOT HARD AT ALL

## 2024-03-11 SDOH — ECONOMIC STABILITY: INCOME INSECURITY: IN THE LAST 12 MONTHS, WAS THERE A TIME WHEN YOU WERE NOT ABLE TO PAY THE MORTGAGE OR RENT ON TIME?: NO

## 2024-03-11 SDOH — HEALTH STABILITY: PHYSICAL HEALTH: ON AVERAGE, HOW MANY MINUTES DO YOU ENGAGE IN EXERCISE AT THIS LEVEL?: 30 MIN

## 2024-03-11 ASSESSMENT — SOCIAL DETERMINANTS OF HEALTH (SDOH)
HOW OFTEN DO YOU ATTEND CHURCH OR RELIGIOUS SERVICES?: NEVER
WITHIN THE PAST 12 MONTHS, YOU WORRIED THAT YOUR FOOD WOULD RUN OUT BEFORE YOU GOT THE MONEY TO BUY MORE: NEVER TRUE
HOW OFTEN DO YOU GET TOGETHER WITH FRIENDS OR RELATIVES?: NEVER
DO YOU BELONG TO ANY CLUBS OR ORGANIZATIONS SUCH AS CHURCH GROUPS UNIONS, FRATERNAL OR ATHLETIC GROUPS, OR SCHOOL GROUPS?: YES
HOW OFTEN DO YOU ATTENT MEETINGS OF THE CLUB OR ORGANIZATION YOU BELONG TO?: NEVER
HOW HARD IS IT FOR YOU TO PAY FOR THE VERY BASICS LIKE FOOD, HOUSING, MEDICAL CARE, AND HEATING?: NOT HARD AT ALL
IN A TYPICAL WEEK, HOW MANY TIMES DO YOU TALK ON THE PHONE WITH FAMILY, FRIENDS, OR NEIGHBORS?: ONCE A WEEK
HOW MANY DRINKS CONTAINING ALCOHOL DO YOU HAVE ON A TYPICAL DAY WHEN YOU ARE DRINKING: 1 OR 2
IN A TYPICAL WEEK, HOW MANY TIMES DO YOU TALK ON THE PHONE WITH FAMILY, FRIENDS, OR NEIGHBORS?: ONCE A WEEK
HOW OFTEN DO YOU ATTENT MEETINGS OF THE CLUB OR ORGANIZATION YOU BELONG TO?: NEVER
HOW OFTEN DO YOU GET TOGETHER WITH FRIENDS OR RELATIVES?: NEVER
HOW OFTEN DO YOU HAVE A DRINK CONTAINING ALCOHOL: 2-3 TIMES A WEEK
HOW OFTEN DO YOU HAVE SIX OR MORE DRINKS ON ONE OCCASION: NEVER
HOW OFTEN DO YOU ATTEND CHURCH OR RELIGIOUS SERVICES?: NEVER
DO YOU BELONG TO ANY CLUBS OR ORGANIZATIONS SUCH AS CHURCH GROUPS UNIONS, FRATERNAL OR ATHLETIC GROUPS, OR SCHOOL GROUPS?: YES

## 2024-03-11 ASSESSMENT — LIFESTYLE VARIABLES
HOW MANY STANDARD DRINKS CONTAINING ALCOHOL DO YOU HAVE ON A TYPICAL DAY: 1 OR 2
SKIP TO QUESTIONS 9-10: 1
AUDIT-C TOTAL SCORE: 3
HOW OFTEN DO YOU HAVE A DRINK CONTAINING ALCOHOL: 2-3 TIMES A WEEK
HOW OFTEN DO YOU HAVE SIX OR MORE DRINKS ON ONE OCCASION: NEVER

## 2024-03-13 ENCOUNTER — OFFICE VISIT (OUTPATIENT)
Dept: MEDICAL GROUP | Facility: MEDICAL CENTER | Age: 69
End: 2024-03-13
Payer: MEDICARE

## 2024-03-13 VITALS
BODY MASS INDEX: 35.49 KG/M2 | HEIGHT: 65 IN | WEIGHT: 213 LBS | HEART RATE: 54 BPM | SYSTOLIC BLOOD PRESSURE: 120 MMHG | TEMPERATURE: 96.8 F | OXYGEN SATURATION: 97 % | DIASTOLIC BLOOD PRESSURE: 70 MMHG

## 2024-03-13 DIAGNOSIS — Z12.31 ENCOUNTER FOR SCREENING MAMMOGRAM FOR MALIGNANT NEOPLASM OF BREAST: ICD-10-CM

## 2024-03-13 DIAGNOSIS — I10 PRIMARY HYPERTENSION: Chronic | ICD-10-CM

## 2024-03-13 DIAGNOSIS — R55 SYNCOPE, UNSPECIFIED SYNCOPE TYPE: Chronic | ICD-10-CM

## 2024-03-13 DIAGNOSIS — R42 VERTIGO: ICD-10-CM

## 2024-03-13 DIAGNOSIS — E78.5 HYPERLIPIDEMIA LDL GOAL <70: ICD-10-CM

## 2024-03-13 DIAGNOSIS — M05.79 RHEUMATOID ARTHRITIS INVOLVING MULTIPLE SITES WITH POSITIVE RHEUMATOID FACTOR (HCC): ICD-10-CM

## 2024-03-13 DIAGNOSIS — E03.9 ACQUIRED HYPOTHYROIDISM: ICD-10-CM

## 2024-03-13 DIAGNOSIS — N95.9 POST MENOPAUSAL PROBLEMS: ICD-10-CM

## 2024-03-13 DIAGNOSIS — R91.8 PULMONARY NODULES: ICD-10-CM

## 2024-03-13 DIAGNOSIS — M35.05 SJOGREN'S SYNDROME WITH INFLAMMATORY ARTHRITIS (HCC): ICD-10-CM

## 2024-03-13 DIAGNOSIS — I21.4 NSTEMI (NON-ST ELEVATED MYOCARDIAL INFARCTION) (HCC): ICD-10-CM

## 2024-03-13 DIAGNOSIS — K52.838 OTHER MICROSCOPIC COLITIS: ICD-10-CM

## 2024-03-13 DIAGNOSIS — Z91.09 ALLERGY TO POLLEN: ICD-10-CM

## 2024-03-13 DIAGNOSIS — R73.01 IMPAIRED FASTING GLUCOSE: ICD-10-CM

## 2024-03-13 DIAGNOSIS — B02.8 HERPES ZOSTER WITH COMPLICATION: ICD-10-CM

## 2024-03-13 PROCEDURE — G0439 PPPS, SUBSEQ VISIT: HCPCS | Performed by: NURSE PRACTITIONER

## 2024-03-13 PROCEDURE — 3074F SYST BP LT 130 MM HG: CPT | Performed by: NURSE PRACTITIONER

## 2024-03-13 PROCEDURE — 3078F DIAST BP <80 MM HG: CPT | Performed by: NURSE PRACTITIONER

## 2024-03-13 RX ORDER — ROSUVASTATIN CALCIUM 10 MG/1
10 TABLET, COATED ORAL EVERY EVENING
Qty: 90 TABLET | Refills: 0 | Status: SHIPPED | OUTPATIENT
Start: 2024-03-13

## 2024-03-13 RX ORDER — ACYCLOVIR 400 MG/1
400 TABLET ORAL 2 TIMES DAILY
Qty: 180 TABLET | Refills: 3 | Status: SHIPPED | OUTPATIENT
Start: 2024-03-13

## 2024-03-13 RX ORDER — SCOLOPAMINE TRANSDERMAL SYSTEM 1 MG/1
1 PATCH, EXTENDED RELEASE TRANSDERMAL
Qty: 12 PATCH | Refills: 3 | Status: SHIPPED | OUTPATIENT
Start: 2024-03-13

## 2024-03-13 RX ORDER — ALBUTEROL SULFATE 90 UG/1
2 AEROSOL, METERED RESPIRATORY (INHALATION) EVERY 6 HOURS PRN
Qty: 8.5 G | Refills: 1 | Status: SHIPPED | OUTPATIENT
Start: 2024-03-13

## 2024-03-13 ASSESSMENT — ENCOUNTER SYMPTOMS: GENERAL WELL-BEING: GOOD

## 2024-03-13 ASSESSMENT — ACTIVITIES OF DAILY LIVING (ADL): BATHING_REQUIRES_ASSISTANCE: 0

## 2024-03-13 ASSESSMENT — FIBROSIS 4 INDEX: FIB4 SCORE: 1.14

## 2024-03-13 ASSESSMENT — PATIENT HEALTH QUESTIONNAIRE - PHQ9: CLINICAL INTERPRETATION OF PHQ2 SCORE: 0

## 2024-03-13 NOTE — ASSESSMENT & PLAN NOTE
Taking crestor 5 mg daily w/o s/e.  She is needing med refilled.  Mostly on healthy diet.  She is exercising some.

## 2024-03-13 NOTE — ASSESSMENT & PLAN NOTE
She is followed by GI.  She hasn't had any recent issues.  She will having occas pain but better with Bentyl.

## 2024-03-13 NOTE — ASSESSMENT & PLAN NOTE
She had CTA chest in 8/22 in ER WITH her MI.  She needed f/u after 6 mo.  We will  now do f/u.  No SOB except climbing stairs.

## 2024-03-13 NOTE — PROGRESS NOTES
Chief Complaint   Patient presents with    Annual Exam       HPI:  Elizabeth Nuñez is a 68 y.o. here for Medicare Annual Wellness Visit.  She is due mammo and dexa scan in july.  She is due prevnar 20.  She will ck w/her rheumatologist before taking d/t ARAVA and plaquenil  She is stable on singulair for her allergies. Needs med refilled  Reviewed lab with pt. A1c up from 5.8 to 6.1.  she is mostly on healthy deit.  Not on med for DM.    LP shows trg and HDL at goal.  LDL still not at goal of <70.  Taking crestor 5 mg daily.    Hyperlipidemia LDL goal <70  Taking crestor 5 mg daily w/o s/e.  She is needing med refilled.  Mostly on healthy diet.  She is exercising some.      Hypertension  Stable and well controlled on lisinopril.  Taking med approp.  No s/e to med.    Hypothyroid  Taking med approp.  No s/e to med.  Will need lab updated in 2 mo.      IFG (impaired fasting glucose)  A1c up some.  Not on med for DM.  On mostly healthy diet and remains active    NSTEMI (non-ST elevated myocardial infarction) ([facility])  Followed by Dr Lui at [facility].  No chest pain or SOB    Other microscopic colitis  She is followed by GI.  She hasn't had any recent issues.  She will having occas pain but better with Bentyl.     Pulmonary nodule  She had CTA chest in 8/22 in ER WITH her MI.  She needed f/u after 6 mo.  We will  now do f/u.  No SOB except climbing stairs.      Rheumatoid arthritis ([facility])  Stable and followed by Dr Degroot    Sjogren's syndrome ([facility])  Stable and followed by Dr Degroot.  Stable dry eyes and mouth    Syncope  Long ago, no concerns    Vertigo  She is having chronic dizziness.  She feels r/t her meds.  Started after she was put on ARAVA. Only occurs on monday after working all nite and sleeping all day.  When she wears the scopalamine she feels better.       Patient Active Problem List    Diagnosis Date Noted    Vertigo 03/13/2024    NSTEMI (non-ST elevated myocardial infarction) (HCC) 01/17/2023    Other  microscopic colitis 01/17/2023    Sjogren's syndrome (HCC)     Rheumatoid arthritis (HCC)     IFG (impaired fasting glucose) 01/05/2019    Obesity (BMI 30-39.9) 07/13/2017    Allergy to pollen     Hypertension 02/22/2012    Syncope 05/20/2010    Hypothyroid 04/22/2009    Pulmonary nodule 04/22/2009    Hyperlipidemia LDL goal <70 04/22/2009    Pruritic dermatitis 04/22/2009    History of diverticulitis of colon 04/22/2009    Carpal tunnel syndrome 04/22/2009    Adenomatous polyp of colon 04/22/2009    GERD (gastroesophageal reflux disease) 04/22/2009       Current Outpatient Medications   Medication Sig Dispense Refill    scopolamine (TRANSDERM SCOP, 1.5 MG,) 1 mg/72hr PATCH 72 HR Place 1 Patch on the skin every 72 hours. 12 Patch 3    albuterol 108 (90 Base) MCG/ACT Aero Soln inhalation aerosol Inhale 2 Puffs every 6 hours as needed for Shortness of Breath. 8.5 g 1    acyclovir (ZOVIRAX) 400 MG tablet Take 1 Tablet by mouth 2 times a day. 180 Tablet 3    rosuvastatin (CRESTOR) 10 MG Tab Take 1 Tablet by mouth every evening. 90 Tablet 0    hydroxychloroquine (PLAQUENIL) 200 MG Tab Take 1 Tablet by mouth 2 times a day. 180 Tablet 1    leflunomide (ARAVA) 10 MG Tab Take 1 Tablet by mouth every day. 30 Tablet 1    lisinopril (PRINIVIL) 10 MG Tab TAKE ONE TABLET BY MOUTH TWICE A  Tablet 2    levothyroxine (SYNTHROID) 137 MCG Tab Take 1 tab daily 6x/wk and 1.5 tab daily 1x/wk 90 Tablet 3    dicyclomine (BENTYL) 10 MG Cap       Magnesium 500 MG Cap       aspirin 81 MG EC tablet Take 81 mg by mouth every day.      ketoconazole (NIZORAL) 2 % Cream Apply 1 Application topically 2 times a day. 30 g 1    Calcium Carb-Cholecalciferol (CALCIUM 500 +D PO) Take 3 Tabs by mouth every evening.      B Complex Cap Take 1 Cap by mouth every day. 30 Cap 0    OMEPRAZOLE 20 MG PO CPDR Take 20 mg by mouth as needed (For heartburn).  11     No current facility-administered medications for this visit.          Current supplements as  per medication list.     Allergies: Misc. sulfonamide containing compounds, Morphine, Sulfa drugs, and Quinine    Current social contact/activities:     She  reports that she has never smoked. She has never used smokeless tobacco. She reports current alcohol use. She reports that she does not use drugs.  Counseling given: Not Answered      ROS:    Gait: Uses no assistive device  Ostomy: No  Other tubes: No  Amputations: No  Chronic oxygen use: No  Last eye exam: 2024  Wears hearing aids: No   : Reports urinary leakage during the last 6 months that has not interfered at all with their daily activities or sleep.  Review of Systems   Constitutional: Negative.  Negative for fever, chills, weight loss, malaise/fatigue and diaphoresis.   HENT: Negative.  Negative for hearing loss, ear pain, nosebleeds, congestion, sore throat, neck pain, tinnitus and ear discharge.    Eyes: Negative.  Negative for blurred vision, double vision, photophobia, pain, discharge and redness.   Respiratory: Negative.  Negative for cough, hemoptysis, sputum production, shortness of breath, wheezing and stridor.    Cardiovascular: Negative.  Negative for chest pain, palpitations, orthopnea, claudication, leg swelling and PND.   Gastrointestinal: Negative.  Negative for heartburn, nausea, vomiting, abdominal pain, constipation, blood in stool and melena. Occas diarrhea d/t meds  Genitourinary: Negative.  Negative for dysuria, urgency, frequency, incontinence, hematuria and flank pain.   Musculoskeletal: Negative.  Negative for myalgias, falls. + shoulder, neck, lower back.  Occas knees  Skin: Negative.  Negative for itching and rash. Followed by derm.   Neurological: Negative.  Negative for tingling, tremors, sensory change, speech change, focal weakness, seizures, loss of consciousness, weakness and headaches.   Endo/Heme/Allergies: Negative.  Negative for environmental allergies and polydipsia. Does not bruise/bleed easily.    Psychiatric/Behavioral: Negative.  Negative for depression, suicidal ideas, hallucinations, memory loss and substance abuse. The patient is not nervous/anxious and does have occas insomnia.    All other systems reviewed and are negative.        Screening:  Mammo and dexa scan  Depression Screening  Little interest or pleasure in doing things?  0 - not at all  Feeling down, depressed , or hopeless? 0 - not at all  Trouble falling or staying asleep, or sleeping too much?     Feeling tired or having little energy?     Poor appetite or overeating?     Feeling bad about yourself - or that you are a failure or have let yourself or your family down?    Trouble concentrating on things, such as reading the newspaper or watching television?    Moving or speaking so slowly that other people could have noticed.  Or the opposite - being so fidgety or restless that you have been moving around a lot more than usual?     Thoughts that you would be better off dead, or of hurting yourself?     Patient Health Questionnaire Score:      If depressive symptoms identified deferred to follow up visit unless specifically addressed in assessment and plan.    Interpretation of PHQ-9 Total Score   Score Severity   1-4 No Depression   5-9 Mild Depression   10-14 Moderate Depression   15-19 Moderately Severe Depression   20-27 Severe Depression    Screening for Cognitive Impairment  Do you or any of your friends or family members have any concern about your memory? No  Three Minute Recall (Leader, Season, Table) 2/3    [place] clock face with all 12 numbers and set the hands to show 10 minutes after 11.  Yes    Cognitive concerns identified deferred for follow up unless specifically addressed in assessment and plan.    Fall Risk Assessment  Has the patient had two or more falls in the last year or any fall with injury in the last year?  No    Safety Assessment  Do you always wear your seatbelt?  Yes  Any changes to home needed to function safely?  No  Difficulty hearing.  Yes  Patient counseled about all safety risks that were identified.    Functional Assessment ADLs  Are there any barriers preventing you from cooking for yourself or meeting nutritional needs?  No.    Are there any barriers preventing you from driving safely or obtaining transportation?  No.    Are there any barriers preventing you from using a telephone or calling for help?  No    Are there any barriers preventing you from shopping?  No.    Are there any barriers preventing you from taking care of your own finances?  No    Are there any barriers preventing you from managing your medications?  No    Are there any barriers preventing you from showering, bathing or dressing yourself? No    Are there any barriers preventing you from doing housework or laundry? No    Are there any barriers preventing you from using the toilet?No    Are you currently engaging in any exercise or physical activity?  No.      Self-Assessment of Health  What is your perception of your health? Good    Do you sleep more than six hours a night? Yes    In the past 7 days, how much did pain keep you from doing your normal work? None    Do you spend quality time with family or friends (virtually or in person)? No    Do you usually eat a heart healthy diet that constists of a variety of fruits, vegetables, whole grains and fiber? Yes    Do you eat foods high in fat and/or Fast Food more than three times per week? No    How concerned are you that your medical conditions are not being well managed? Not at all    Are you worried that in the next 2 months, you may not have stable housing that you own, rent, or stay in as part of a household? No        Advance Care Planning  Do you have an Advance Directive, Living Will, Durable Power of , or POLST? No                 Health Maintenance Summary            Ordered - Mammogram (Yearly) Ordered on 3/13/2024      06/21/2023  MA-SCREENING MAMMO BILAT W/TOMOSYNTHESIS W/CAD     06/17/2022  MA-SCREENING MAMMO BILAT W/TOMOSYNTHESIS W/CAD    06/16/2021  MA-SCREENING MAMMO BILAT W/TOMOSYNTHESIS W/CAD    06/13/2020  MA-SCREENING MAMMO BILAT W/TOMOSYNTHESIS W/CAD    01/08/2019  MA-SCREEN MAMMO W/CAD-BILAT    Only the first 5 history entries have been loaded, but more history exists.              Ordered - Bone Density Scan (Every 2 Years) Ordered on 3/13/2024      07/13/2022  DS-BONE DENSITY STUDY (DEXA)    07/10/2020  DS-BONE DENSITY STUDY (DEXA)    11/01/2013  DS-BONE DENSITY STUDY (DEXA)    10/20/2011  DS-BONE DENSITY STUDY (DEXA)    04/20/2009  DS-BONE DENSITY STUDY (DEXA)    Only the first 5 history entries have been loaded, but more history exists.              Annual Wellness Visit (Yearly) Next due on 3/13/2025      03/13/2024  Done    01/17/2023  Done    09/09/2021  Level of Service: OK PREVENTIVE VISIT,EST,65 & OVER              IMM DTaP/Tdap/Td Vaccine (3 - Td or Tdap) Next due on 6/21/2027 06/21/2017  Imm Admin: Tdap Vaccine    07/13/2009  Imm Admin: Tdap Vaccine              Colorectal Cancer Screening (Colonoscopy - Every 10 Years) Tentatively due on 4/26/2033 04/26/2023  AMB EXTERNAL COLONOSCOPY RESULTS    03/15/2017  Colonoscopy (Done)    02/28/2012  Colonoscopy (Done)              Hepatitis A Vaccine (Hep A) (Series Information) Aged Out      08/13/2018  Imm Admin: Hepatitis A Vaccine, Adult    02/13/2018  Imm Admin: Hepatitis A Vaccine, Adult              Zoster (Shingles) Vaccines (Series Information) Completed      02/05/2019  Imm Admin: Zoster Vaccine Recombinant (RZV) (SHINGRIX)    09/06/2018  Imm Admin: Zoster Vaccine Recombinant (RZV) (SHINGRIX)    09/30/2013  Outside Immunization: Varicella    10/15/2012  Outside Immunization: Varicella    10/20/2011  Outside Immunization: Varicella    Only the first 5 history entries have been loaded, but more history exists.              Hepatitis C Screening  Tentatively Complete      06/02/2020  Hepatitis C Antibody  component of HEP C VIRUS ANTIBODY              Pneumococcal Vaccine: 65+ Years (Series Information) Completed      05/03/2022  Imm Admin: Pneumococcal polysaccharide vaccine (PPSV-23)    09/09/2021  Imm Admin: Pneumococcal Conjugate Vaccine (Prevnar/PCV-13)              Influenza Vaccine (Series Information) Completed      09/25/2023  Outside Immunization: Influenza Quad Adjuvanted    09/16/2022  Imm Admin: Influenza Vaccine Adult HD    09/27/2021  Imm Admin: Influenza Vaccine Adult HD    09/08/2020  Imm Admin: Influenza Vaccine Adult HD    09/02/2020  Imm Admin: Influenza Vaccine Quad Inj (Pf)    Only the first 5 history entries have been loaded, but more history exists.              COVID-19 Vaccine (Series Information) Completed      09/25/2023  Imm Admin: Comirnaty (Covid-19 Vaccine, Mrna, [id number] Formula)    11/19/2021  Imm Admin: MODERNA SARS-COV-2 VACCINE (12+)    01/27/2021  Imm Admin: MODERNA SARS-COV-2 VACCINE (12+)    12/30/2020  Imm Admin: MODERNA SARS-COV-2 VACCINE (12+)              Hepatitis B Vaccine (Hep B) (Series Information) Aged Out      No completion history exists for this topic.              HPV Vaccines (Series Information) Aged Out      No completion history exists for this topic.              Polio Vaccine (Inactivated Polio) (Series Information) Aged Out      No completion history exists for this topic.              Meningococcal Immunization (Series Information) Aged Out      No completion history exists for this topic.              Discontinued - Cervical Cancer Screening  Discontinued        Frequency changed to Never automatically (Topic No Longer Applies)    10/23/2013  PAP IG (IMAGE GUIDED)                    Patient Care Team:  BASILIO Carrera as PCP - General (Family Medicine)      Social History     Tobacco Use    Smoking status: Never    Smokeless tobacco: Never   Vaping Use    Vaping Use: Never used   Substance Use Topics    Alcohol use: Yes     Comment: Occasionally     "Drug use: No     Family History   Problem Relation Age of Onset    Cancer Mother         ovarian, age 52    Cancer Maternal Aunt         breast, dx age 33,  age 62    Breast Cancer Maternal Aunt     Cancer Maternal Aunt         ovarian cancer    Cancer Other          cousin, breast cancer age 40    Cancer Paternal Grandfather         stomach    Cancer Paternal Uncle         stomach     She  has a past medical history of Adenomatous polyp of colon (2009), Allergic rhinitis, Carpal tunnel syndrome (2009), Diverticulitis (2009), GERD (gastroesophageal reflux disease) (2009), Hyperlipidemia LDL goal <70 (2009), Hypertension, Hypothyroidism, Impaired fasting glucose (2009), NSTEMI (non-ST elevated myocardial infarction) ([facility]) (2023), Obesity (BMI 30-39.9) (2017), Other microscopic colitis (2023), Pruritic dermatitis (2009), Pulmonary nodule (2009), Rheumatoid arthritis ([facility]), Sjogren's syndrome ([facility]), Syncope, and Vertigo (2024).   Past Surgical History:   Procedure Laterality Date    HYSTERECTOMY ROBOTIC  2009    Performed by ARLEN ABDI at [facility].   due to uterine fibroids and ovarian cyst    ARTHROSCOPY, KNEE      left knee repair    METATARSAL HEAD RESECTION      metatarsal arthroplasty     PRIMARY C SECTION      x2    UMBILICAL HERNIA REPAIR         Exam:   /70 (BP Location: Right arm, Patient Position: Sitting, BP Cuff Size: Adult)   Pulse (!) 54   Temp 36 °C (96.8 °F) (Temporal)   Ht 1.66 m (5' 5.35\")   Wt 96.6 kg (213 lb)   SpO2 97%  Body mass index is 35.06 kg/m².  Hearing fair.    Dentition good  Alert, oriented in no acute distress.  Eye contact is good, speech goal directed, affect calm  Physical Exam   Vitals reviewed.  Constitutional: oriented to person, place, and time. appears well-developed and well-nourished. No distress.   HENT: Head: Normocephalic and atraumatic. Bilateral tympanic " membranes wnl w/o bulging.  Right Ear: External ear normal. Left Ear: External ear normal. Nose: Nose normal.  Mouth/Throat: Oropharynx is clear and moist. No oropharyngeal exudate. norma tm wnl. Eyes: Conjunctivae and EOM are normal. Pupils are equal, round, and reactive to light. Right eye exhibits no discharge. Left eye exhibits no discharge. No scleral icterus.    Neck: Normal range of motion. Neck supple. No JVD present.   Cardiovascular: Normal rate, regular rhythm, normal heart sounds and intact distal pulses.  Exam reveals no gallop and no friction rub.  No murmur heard.  No carotid bruits   Pulmonary/Chest: Effort normal and breath sounds normal. No stridor. No respiratory distress. no wheezes or rales. exhibits no tenderness.   Abdominal: Soft. Bowel sounds are normal. exhibits no distension and no mass. No tenderness. no rebound and no guarding.   Musculoskeletal: Normal range of motion. exhibits no edema or tenderness.  norma pedal pulses 2+.  Lymphadenopathy:  no cervical or supraclavicular adenopathy.   Neurological: alert and oriented to person, place, and time. has normal reflexes. displays normal reflexes. No cranial nerve deficit. exhibits normal muscle tone. Coordination normal.   Skin: Skin is warm and dry. No rash noted. no diaphoresis. No erythema. No pallor.   Psychiatric: normal mood and affect. behavior is normal.       Assessment and Plan. The following treatment and monitoring plan is recommended:    1. Hyperlipidemia LDL goal <70  Comp Metabolic Panel    Lipid Profile    increase crestor to 10 mg.  LDL not at goal of <70. diaz lab 2 mo. f/u for eview      2. Primary hypertension      stable and well controlled on lisinorpil      3. Vertigo  scopolamine (TRANSDERM SCOP, 1.5 MG,) 1 mg/72hr PATCH 72 HR    rf scopalamine patch. uses 1x/wk d/t med s/e.      4. Pulmonary nodules  CT-CHEST (THORAX) W/O    CTA in 2022 recommended repeat CT.  will do CT chest now and again in 1 yr. no further testing  unless nodules not stable. no current sx.      5. Herpes zoster with complication  acyclovir (ZOVIRAX) 400 MG tablet    refilled acyclovir for chronic use d/t recurrent shingles sx if she stops the med.        6. Acquired hypothyroidism  TSH    FREE THYROXINE    stable and controlled on levothyroxine. diaz TSH, T4 in 2 mo. f/u for review.      7. IFG (impaired fasting glucose)      a1c up from previous. not on DM med. enc pt to improve healthy diet and do regular exercise.      8. NSTEMI (non-ST elevated myocardial infarction) (HCC)      stable. followed by cardiac - [place] [facility]      9. Other microscopic colitis      stable. followed by GI      10. Rheumatoid arthritis involving multiple sites with positive rheumatoid factor ([facility])      followed by Dr Degroot on ARAVA and plaquenil      11. Sjogren's syndrome with inflammatory arthritis ([facility])      followed by Dr Degroot on ARAVA and plaquenil      12. Syncope, unspecified syncope type      resolved      13. Allergy to pollen  albuterol 108 (90 Base) MCG/ACT Aero Soln inhalation aerosol    refilled singulair      14. Post menopausal problems  DS-BONE DENSITY STUDY (DEXA)    dexa scan and mammo ordered      15. Encounter for screening mammogram for malignant neoplasm of breast  MA-SCREENING MAMMO BILAT W/TOMOSYNTHESIS W/CAD            Services suggested: No services needed at this time  Health Care Screening: Age-appropriate preventive services recommended by USPTF and ACIP covered by Medicare were discussed today. Services ordered if indicated and agreed upon by the patient.  Referrals offered: Community-based lifestyle interventions to reduce health risks and promote self-management and wellness, fall prevention, nutrition, physical activity, tobacco-use cessation, weight loss, and mental health services as per orders if indicated.    Discussion today about general wellness and lifestyle habits:    Prevent falls and reduce trip hazards; Cautioned about securing  or removing rugs.  Have a working fire alarm and carbon monoxide detector;   Engage in regular physical activity and social activities     Follow-up: Return in about 2 months (around 5/13/2024), or for lab review.

## 2024-03-13 NOTE — ASSESSMENT & PLAN NOTE
Taking med approp.  No s/e to med.  Will need lab updated in 2 mo.     This was a shared visit with the [facility]. I reviewed and verified the documentation and independently performed the documented:

## 2024-03-13 NOTE — ASSESSMENT & PLAN NOTE
She is having chronic dizziness.  She feels r/t her meds.  Started after she was put on ARAVA. Only occurs on monday after working all nite and sleeping all day.  When she wears the scopalamine she feels better.

## 2024-03-19 ENCOUNTER — APPOINTMENT (RX ONLY)
Dept: URBAN - METROPOLITAN AREA CLINIC 35 | Facility: CLINIC | Age: 69
Setting detail: DERMATOLOGY
End: 2024-03-19

## 2024-03-19 DIAGNOSIS — L57.0 ACTINIC KERATOSIS: ICD-10-CM

## 2024-03-19 DIAGNOSIS — L82.1 OTHER SEBORRHEIC KERATOSIS: ICD-10-CM

## 2024-03-19 PROCEDURE — ? LIQUID NITROGEN

## 2024-03-19 PROCEDURE — 17000 DESTRUCT PREMALG LESION: CPT

## 2024-03-19 PROCEDURE — ? COUNSELING

## 2024-03-19 PROCEDURE — 17003 DESTRUCT PREMALG LES 2-14: CPT

## 2024-03-19 PROCEDURE — 99212 OFFICE O/P EST SF 10 MIN: CPT | Mod: 25

## 2024-03-19 ASSESSMENT — LOCATION SIMPLE DESCRIPTION DERM
LOCATION SIMPLE: RIGHT FOREHEAD
LOCATION SIMPLE: NOSE
LOCATION SIMPLE: RIGHT CHEEK

## 2024-03-19 ASSESSMENT — LOCATION DETAILED DESCRIPTION DERM
LOCATION DETAILED: RIGHT SUPERIOR CENTRAL BUCCAL CHEEK
LOCATION DETAILED: NASAL INFRATIP
LOCATION DETAILED: RIGHT INFERIOR LATERAL FOREHEAD

## 2024-03-19 ASSESSMENT — LOCATION ZONE DERM
LOCATION ZONE: FACE
LOCATION ZONE: NOSE

## 2024-03-19 NOTE — HPI: SKIN LESIONS
Is This A New Presentation, Or A Follow-Up?: Skin Lesions
How Severe Is Your Skin Lesion?: mild
Have Your Skin Lesions Been Treated?: not been treated
Additional History: Patient has lesions of concern on nose tip, right cheek and temple. She describes them as rough and scaly. Sometimes scabby with crust that falls off.  Patient is scheduled for an annual full skin exam this July.

## 2024-03-19 NOTE — PROCEDURE: LIQUID NITROGEN
Application Tool (Optional): Cry-AC
Render Note In Bullet Format When Appropriate: No
Consent: The patient's consent was obtained including but not limited to risks of crusting, scabbing, blistering, scarring, darker or lighter pigmentary change, recurrence, incomplete removal and infection.
Number Of Freeze-Thaw Cycles: 1 freeze-thaw cycle
Show Aperture Variable?: Yes
Detail Level: Detailed
Duration Of Freeze Thaw-Cycle (Seconds): 10
Post-Care Instructions: I reviewed with the patient in detail post-care instructions. Patient is to wear sunprotection, and avoid picking at any of the treated lesions. Pt may apply Vaseline to crusted or scabbing areas.

## 2024-03-25 ENCOUNTER — HOSPITAL ENCOUNTER (OUTPATIENT)
Dept: RADIOLOGY | Facility: MEDICAL CENTER | Age: 69
End: 2024-03-25
Attending: NURSE PRACTITIONER
Payer: MEDICARE

## 2024-03-25 DIAGNOSIS — R91.8 PULMONARY NODULES: ICD-10-CM

## 2024-03-25 PROCEDURE — 71250 CT THORAX DX C-: CPT

## 2024-03-28 ENCOUNTER — OFFICE VISIT (OUTPATIENT)
Dept: URGENT CARE | Facility: CLINIC | Age: 69
End: 2024-03-28
Payer: MEDICARE

## 2024-03-28 VITALS
HEART RATE: 50 BPM | WEIGHT: 212 LBS | RESPIRATION RATE: 14 BRPM | BODY MASS INDEX: 35.32 KG/M2 | SYSTOLIC BLOOD PRESSURE: 124 MMHG | HEIGHT: 65 IN | TEMPERATURE: 96.9 F | OXYGEN SATURATION: 98 % | DIASTOLIC BLOOD PRESSURE: 80 MMHG

## 2024-03-28 DIAGNOSIS — L03.114 CELLULITIS OF HAND, LEFT: ICD-10-CM
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
## 2024-03-28 RX ORDER — CEPHALEXIN 500 MG/1
500 CAPSULE ORAL 4 TIMES DAILY
Qty: 20 CAPSULE | Refills: 0 | Status: SHIPPED | OUTPATIENT
Start: 2024-03-28 | End: 2024-04-02

## 2024-03-28 ASSESSMENT — FIBROSIS 4 INDEX: FIB4 SCORE: 1.14

## 2024-03-28 NOTE — PROGRESS NOTES
Subjective:   Elizabeth Nuñez is a 68 y.o. female who presents for Insect Bite (L hand, redness, possible sting, painful, swelling x 2 days )      HPI  The patient presents to the Urgent Care with complaints of a bug bite to her left hand onset 2 days.  She was reaching her hand in a bale of hay when she felt a bug bite to her left hand.  Gradual increased redness and swelling.  Associated itching.  Not much pain.  No streaking.  Denies any drainage.  Denies any fevers, chills, muscle aches, headaches.  She has applied hydrocortisone cream without any noticeable difference.  She had some concern for possible infection.        Past Medical History:   Diagnosis Date    Adenomatous polyp of colon 04/22/2009    Allergic rhinitis     Carpal tunnel syndrome 04/22/2009    EMG 11/07    Diverticulitis 04/22/2009    recurrent.  CT scan 10/07 negative    GERD (gastroesophageal reflux disease) 04/22/2009    Hyperlipidemia LDL goal <70 04/22/2009 11/07 chol 203, trig 139, hdl 38, ldl 137 12/09 chol 193,trig 98,hdl 46,ldl 127 10/11 chol 183,trig 97,hdl 35,ldl 129 5/12 chol 168,trig 111,hdl 46,ldl 100 on zocor 10 mg    Hypertension     Hypothyroidism     Impaired fasting glucose 04/22/2009    NSTEMI (non-ST elevated myocardial infarction) (HCC) 01/17/2023    Obesity (BMI 30-39.9) 07/13/2017    Other microscopic colitis 01/17/2023    Pruritic dermatitis 04/22/2009    followed by derm    Pulmonary nodule 04/22/2009    Last CT chest 10/10 showed stable nodules with no further w/u needed    Rheumatoid arthritis (HCC)     Sjogren's syndrome (HCC)     Syncope     unknown etiology.  cardiac w/u neg    Vertigo 03/13/2024     Allergies   Allergen Reactions    Misc. Sulfonamide Containing Compounds Hives and Itching    Morphine Hives and Itching    Sulfa Drugs Hives and Itching    Quinine Hives     Hives  Hives  Hives    Shellfish-Derived Products         Objective:     /80 (BP Location: Left arm, Patient Position: Sitting,  "BP Cuff Size: Adult)   Pulse (!) 50   Temp 36.1 °C (96.9 °F) (Temporal)   Resp 14   Ht 1.651 m (5' 5\")   Wt 96.2 kg (212 lb)   LMP 08/11/2010   SpO2 98%   BMI 35.28 kg/m²     Physical Exam  Vitals reviewed.   Constitutional:       General: She is not in acute distress.     Appearance: Normal appearance. She is not ill-appearing or toxic-appearing.   Eyes:      Conjunctiva/sclera: Conjunctivae normal.   Cardiovascular:      Rate and Rhythm: Normal rate.   Pulmonary:      Effort: Pulmonary effort is normal.   Musculoskeletal:        Hands:       Cervical back: Neck supple.      Comments: Left lateral dorsal hand there is a localized area of erythema and edema with warmth approximately 3x5 cm oval shaped. Small scab to the center. No abscess. No drainage. No ulceration or necrosis. Full ROM of the wrist and thumb. No proximal streaking. Non tender.    Skin:     General: Skin is warm and dry.   Neurological:      General: No focal deficit present.      Mental Status: She is alert and oriented to person, place, and time.   Psychiatric:         Mood and Affect: Mood normal.         Behavior: Behavior normal.         Diagnosis and associated orders:     1. Bug bite, initial encounter    2. Cellulitis of hand, left  - cephALEXin (KEFLEX) 500 MG Cap; Take 1 Capsule by mouth 4 times a day for 5 days.  Dispense: 20 Capsule; Refill: 0       Comments/MDM:     Patient's presenting symptoms and exam findings are consistent with a bug bite and inflammation.  However, due to appearance of possible cellulitis, we will treat for possible infection with Keflex.  In addition, encouraged cool compresses, Zyrtec for itching, hydrocortisone cream for itching, and ibuprofen for any discomfort.  If no improvement in 2 to 3 days or any worsening redness, swelling, streaking, or any systemic symptoms, recommend return to the urgent care or presenting to the ER for reevaluation.       I personally reviewed prior external notes and test " results pertinent to today's visit. Pathogenesis of diagnosis discussed including typical length and natural progression. Supportive care, natural history, differential diagnoses, and indications for immediate follow-up discussed. Patient expresses understanding and agrees to plan. Patient denies any other questions or concerns.     Follow-up with the primary care physician for recheck, reevaluation, and consideration of further management.    Please note that this dictation was created using voice recognition software. I have made a reasonable attempt to correct obvious errors, but I expect that there are errors of grammar and possibly content that I did not discover before finalizing the note.    This note was electronically signed by Junaid Magallanes PA-C

## 2024-04-25 DIAGNOSIS — E03.9 ACQUIRED HYPOTHYROIDISM: ICD-10-CM

## 2024-04-25 NOTE — TELEPHONE ENCOUNTER
Received request via: Patient    Was the patient seen in the last year in this department? Yes    Does the patient have an active prescription (recently filled or refills available) for medication(s) requested? No    Pharmacy Name:   [facility] [id number] - [place] - [address]  [address] [address]  Phone: [phone] Fax: [phone]    Does the patient have skilled nursing Plus and need 100 day supply (blood pressure, diabetes and cholesterol meds only)? Patient does not have SCP

## 2024-04-27 RX ORDER — LEVOTHYROXINE SODIUM 137 UG/1
TABLET ORAL
Qty: 90 TABLET | Refills: 0 | Status: SHIPPED | OUTPATIENT
Start: 2024-04-27

## 2024-05-22 ENCOUNTER — HOSPITAL ENCOUNTER (OUTPATIENT)
Dept: LAB | Facility: MEDICAL CENTER | Age: 69
End: 2024-05-22
Attending: INTERNAL MEDICINE
Payer: MEDICARE

## 2024-05-22 ENCOUNTER — APPOINTMENT (OUTPATIENT)
Dept: MEDICAL GROUP | Facility: MEDICAL CENTER | Age: 69
End: 2024-05-22
Payer: MEDICARE

## 2024-05-22 DIAGNOSIS — E03.9 ACQUIRED HYPOTHYROIDISM: ICD-10-CM

## 2024-05-22 DIAGNOSIS — E78.5 HYPERLIPIDEMIA LDL GOAL <70: ICD-10-CM

## 2024-05-22 LAB
ALBUMIN SERPL BCP-MCNC: 3.8 G/DL (ref 3.2–4.9)
ALBUMIN/GLOB SERPL: 1.2 G/DL
ALP SERPL-CCNC: 84 U/L (ref 30–99)
ALT SERPL-CCNC: 47 U/L (ref 2–50)
ANION GAP SERPL CALC-SCNC: 10 MMOL/L (ref 7–16)
AST SERPL-CCNC: 28 U/L (ref 12–45)
BILIRUB SERPL-MCNC: 0.7 MG/DL (ref 0.1–1.5)
BUN SERPL-MCNC: 13 MG/DL (ref 8–22)
CALCIUM ALBUM COR SERPL-MCNC: 9.4 MG/DL (ref 8.5–10.5)
CALCIUM SERPL-MCNC: 9.2 MG/DL (ref 8.4–10.2)
CHLORIDE SERPL-SCNC: 104 MMOL/L (ref 96–112)
CHOLEST SERPL-MCNC: 148 MG/DL (ref 100–199)
CO2 SERPL-SCNC: 25 MMOL/L (ref 20–33)
CREAT SERPL-MCNC: 0.57 MG/DL (ref 0.5–1.4)
FASTING STATUS PATIENT QL REPORTED: NORMAL
GFR SERPLBLD CREATININE-BSD FMLA CKD-EPI: 99 ML/MIN/1.73 M 2
GLOBULIN SER CALC-MCNC: 3.1 G/DL (ref 1.9–3.5)
GLUCOSE SERPL-MCNC: 97 MG/DL (ref 65–99)
HDLC SERPL-MCNC: 56 MG/DL
LDLC SERPL CALC-MCNC: 75 MG/DL
POTASSIUM SERPL-SCNC: 4.5 MMOL/L (ref 3.6–5.5)
PROT SERPL-MCNC: 6.9 G/DL (ref 6–8.2)
SODIUM SERPL-SCNC: 139 MMOL/L (ref 135–145)
T4 FREE SERPL-MCNC: 1.99 NG/DL (ref 0.93–1.7)
TRIGL SERPL-MCNC: 87 MG/DL (ref 0–149)
TSH SERPL DL<=0.005 MIU/L-ACNC: 0.06 UIU/ML (ref 0.38–5.33)

## 2024-05-28 ENCOUNTER — OFFICE VISIT (OUTPATIENT)
Dept: MEDICAL GROUP | Facility: MEDICAL CENTER | Age: 69
End: 2024-05-28
Payer: MEDICARE

## 2024-05-28 VITALS
OXYGEN SATURATION: 96 % | TEMPERATURE: 97.8 F | SYSTOLIC BLOOD PRESSURE: 120 MMHG | HEIGHT: 65 IN | DIASTOLIC BLOOD PRESSURE: 74 MMHG | WEIGHT: 203 LBS | BODY MASS INDEX: 33.82 KG/M2 | HEART RATE: 60 BPM

## 2024-05-28 DIAGNOSIS — E03.9 ACQUIRED HYPOTHYROIDISM: ICD-10-CM

## 2024-05-28 DIAGNOSIS — R79.89 ELEVATED LFTS: ICD-10-CM

## 2024-05-28 DIAGNOSIS — R94.6 ABNORMAL THYROID FUNCTION TEST: ICD-10-CM

## 2024-05-28 PROCEDURE — 3074F SYST BP LT 130 MM HG: CPT | Performed by: NURSE PRACTITIONER

## 2024-05-28 PROCEDURE — 99214 OFFICE O/P EST MOD 30 MIN: CPT | Performed by: NURSE PRACTITIONER

## 2024-05-28 PROCEDURE — 3078F DIAST BP <80 MM HG: CPT | Performed by: NURSE PRACTITIONER

## 2024-05-28 ASSESSMENT — FIBROSIS 4 INDEX: FIB4 SCORE: 1.15

## 2024-05-28 NOTE — PROGRESS NOTES
Subjective:     History of Present Illness  The patient is a 68-year-old female seen in follow-up for impaired fasting glucose.    However, she has been experiencing symptoms indicative of hyperthyroidism, including palpitations, nervousness, periorbital edema, mouth sores, tremors, xerostomia, arthralgia, cognitive fog, nausea, dizziness, tinnitus, and spontaneous unresponsiveness of raspea. She has discontinued the use of leflunomide, suspecting it may be exacerbating her symptoms. Despite her efforts to consult her rheumatologist, she discontinued the medication. Her current medication regimen includes levothyroxine 137 mcg six times a week and 1.5 mcg once a week. She reports persistent fatigue, which she attributes to her rheumatoid arthritis.       Results    Lab reviewed with pt:  GFR, CMP, LP IS WNL  She is stable and well controlled on crestor.  She is concerned that her lft's are up from previous despite being wnl.  She relates this to her RA med.    TSH is down from 1.47 to 0.063.  T4 is high at 1.99.  taking the levothyroxine approp    Current medicines (including changes today)  Current Outpatient Medications   Medication Sig Dispense Refill    levothyroxine (SYNTHROID) 137 MCG Tab Take 1 tab daily 6x/wk and 1.5 tab daily 1x/wk 90 Tablet 0    scopolamine (TRANSDERM SCOP, 1.5 MG,) 1 mg/72hr PATCH 72 HR Place 1 Patch on the skin every 72 hours. 12 Patch 3    albuterol 108 (90 Base) MCG/ACT Aero Soln inhalation aerosol Inhale 2 Puffs every 6 hours as needed for Shortness of Breath. 8.5 g 1    acyclovir (ZOVIRAX) 400 MG tablet Take 1 Tablet by mouth 2 times a day. 180 Tablet 3    rosuvastatin (CRESTOR) 10 MG Tab Take 1 Tablet by mouth every evening. 90 Tablet 0    hydroxychloroquine (PLAQUENIL) 200 MG Tab Take 1 Tablet by mouth 2 times a day. 180 Tablet 1    lisinopril (PRINIVIL) 10 MG Tab TAKE ONE TABLET BY MOUTH TWICE A  Tablet 2    dicyclomine (BENTYL) 10 MG Cap       Magnesium 500 MG Cap        aspirin 81 MG EC tablet Take 81 mg by mouth every day.      ketoconazole (NIZORAL) 2 % Cream Apply 1 Application topically 2 times a day. 30 g 1    B Complex Cap Take 1 Cap by mouth every day. 30 Cap 0    OMEPRAZOLE 20 MG PO CPDR Take 20 mg by mouth as needed (For heartburn).  11     No current facility-administered medications for this visit.     She  has a past medical history of Adenomatous polyp of colon (04/22/2009), Allergic rhinitis, Carpal tunnel syndrome (04/22/2009), Diverticulitis (04/22/2009), GERD (gastroesophageal reflux disease) (04/22/2009), Hyperlipidemia LDL goal <70 (04/22/2009), Hypertension, Hypothyroidism, Impaired fasting glucose (04/22/2009), NSTEMI (non-ST elevated myocardial infarction) ([facility]) (01/17/2023), Obesity (BMI 30-39.9) (07/13/2017), Other microscopic colitis (01/17/2023), Pruritic dermatitis (04/22/2009), Pulmonary nodule (04/22/2009), Rheumatoid arthritis ([facility]), Sjogren's syndrome ([facility]), Syncope, and Vertigo (03/13/2024).        Thyroid:  Lab Results   Component Value Date/Time    TSHULTRASEN 0.063 (L) 05/22/2024 0711     Lab Results   Component Value Date/Time    FREET4 1.99 (H) 05/22/2024 0711   Complete Metabolic Panel:  Lab Results   Component Value Date/Time    SODIUM 139 05/22/2024 07:11 AM    POTASSIUM 4.5 05/22/2024 07:11 AM    CHLORIDE 104 05/22/2024 07:11 AM    CO2 25 05/22/2024 07:11 AM    ANION 10.0 05/22/2024 07:11 AM    GLUCOSE 97 05/22/2024 07:11 AM    BUN 13 05/22/2024 07:11 AM    CREATININE 0.57 05/22/2024 07:11 AM    CREATININE 0.83 03/29/2011 07:30 AM    ASTSGOT 28 05/22/2024 07:11 AM    ALTSGPT 47 05/22/2024 07:11 AM    TBILIRUBIN 0.7 05/22/2024 07:11 AM    ALBUMIN 3.8 05/22/2024 07:11 AM    ALBUMIN 3.95 07/28/2023 07:07 AM    TOTPROTEIN 6.9 05/22/2024 07:11 AM    TOTPROTEIN 6.8 07/28/2023 07:07 AM    GLOBULIN 3.1 05/22/2024 07:11 AM    AGRATIO 1.2 05/22/2024 07:11 AM           GFR:    Lab Results   Component Value Date/Time    GFRCKD 99 05/22/2024 0711  "          ROS   Review of Systems   Constitutional: Negative.  Negative for fever, chills, weight loss, malaise/fatigue and diaphoresis.   HENT: Negative.  Negative for hearing loss, ear pain, nosebleeds, congestion, sore throat, neck pain, tinnitus and ear discharge.    Respiratory: Negative.  Negative for cough, hemoptysis, sputum production, shortness of breath, wheezing and stridor.    Cardiovascular: Negative.  Negative for chest pain, orthopnea, claudication, leg swelling and PND.   Gastrointestinal: denies nausea, vomiting, diarrhea, constipation, heartburn, melena or hematochezia.  Genitourinary: Denies dysuria, hematuria, urinary incontinence, frequency or urgency.    Musculoskeletal: Negative.  Negative for myalgias and back pain.   Neurological: Negative.  Negative for weakness and headaches.   Psych:  Denies depression.  All other systems reviewed and are negative.         Objective:     /74 (BP Location: Left arm, Patient Position: Sitting, BP Cuff Size: Adult)   Pulse 60   Temp 36.6 °C (97.8 °F) (Temporal)   Ht 1.651 m (5' 5\")   Wt 92.1 kg (203 lb)   SpO2 96%  Body mass index is 33.78 kg/m².   Physical Exam    Physical Exam   Vitals reviewed.  Constitutional: oriented to person, place, and time. appears well-developed and well-nourished. No distress.   Neck: No JVD present.  Cardiovascular: Normal rate, regular rhythm, normal heart sounds and intact distal pulses.  Exam reveals no gallop and no friction rub.  No murmur heard.  No carotid bruits.   Pulmonary/Chest: Effort normal and breath sounds normal. No stridor. No respiratory distress. no wheezes or rales. exhibits no tenderness.   Musculoskeletal: Normal range of motion. exhibits no edema. norma pedal pulses 2+.  Lymphadenopathy: no cervical or supraclavicular adenopathy.   Neurological: alert and oriented to person, place, and time. exhibits normal muscle tone. Coordination normal.   Skin: Skin is warm and dry. no diaphoresis. "   Psychiatric: normal mood and affect. behavior is normal.           Assessment and Plan:   The following treatment plan was discussed      Assessment & Plan  1. Hyperthyroidism.  The patient's thyroid function was found to be abnormal. The dosage of levothyroxine will be reduced to 137 mcg, to be taken six days a week. A re-test will be conducted in 2 months.    2. Elevated LFTs.  A chemistry panel will be conducted in 2 months to monitor liver enzymes.  They are current high normal.    3.  Hyperlipidemia <100  The patient's total cholesterol levels are satisfactory.     Follow-up  A follow-up appointment is scheduled for early 08/2024.      ORDERS:  1. Abnormal thyroid function test  TSH    FREE THYROXINE    T3 FREE    THYROID PEROXIDASE  (TPO) AB      2. Elevated LFTs  Comp Metabolic Panel      3. Acquired hypothyroidism                  Please note that this dictation was created using voice recognition software. I have made every reasonable attempt to correct obvious errors, but I expect that there are errors of grammar and possibly content that I did not discover before finalizing the note.      Attestation      Verbal consent was acquired by the patient to use [employer] ambient listening note generation during this visit Yes

## 2024-05-29 ENCOUNTER — APPOINTMENT (OUTPATIENT)
Dept: MEDICAL GROUP | Facility: MEDICAL CENTER | Age: 69
End: 2024-05-29
Payer: MEDICARE

## 2024-06-12 RX ORDER — ROSUVASTATIN CALCIUM 10 MG/1
10 TABLET, COATED ORAL EVERY EVENING
Qty: 90 TABLET | Refills: 0 | Status: SHIPPED | OUTPATIENT
Start: 2024-06-12

## 2024-06-22 DIAGNOSIS — I10 ESSENTIAL HYPERTENSION: Chronic | ICD-10-CM

## 2024-06-22 RX ORDER — LISINOPRIL 10 MG/1
10 TABLET ORAL 2 TIMES DAILY
Qty: 180 TABLET | Refills: 0 | Status: SHIPPED | OUTPATIENT
Start: 2024-06-22

## 2024-07-02 ENCOUNTER — APPOINTMENT (RX ONLY)
Dept: URBAN - METROPOLITAN AREA CLINIC 35 | Facility: CLINIC | Age: 69
Setting detail: DERMATOLOGY
End: 2024-07-02

## 2024-07-02 DIAGNOSIS — D22 MELANOCYTIC NEVI: ICD-10-CM

## 2024-07-02 DIAGNOSIS — L82.1 OTHER SEBORRHEIC KERATOSIS: ICD-10-CM

## 2024-07-02 DIAGNOSIS — Z71.89 OTHER SPECIFIED COUNSELING: ICD-10-CM

## 2024-07-02 DIAGNOSIS — L81.4 OTHER MELANIN HYPERPIGMENTATION: ICD-10-CM

## 2024-07-02 PROBLEM — D23.5 OTHER BENIGN NEOPLASM OF SKIN OF TRUNK: Status: ACTIVE | Noted: 2024-07-02

## 2024-07-02 PROBLEM — D22.5 MELANOCYTIC NEVI OF TRUNK: Status: ACTIVE | Noted: 2024-07-02

## 2024-07-02 PROCEDURE — 99213 OFFICE O/P EST LOW 20 MIN: CPT

## 2024-07-02 PROCEDURE — ? COUNSELING

## 2024-07-02 ASSESSMENT — LOCATION ZONE DERM: LOCATION ZONE: TRUNK

## 2024-07-02 ASSESSMENT — LOCATION DETAILED DESCRIPTION DERM
LOCATION DETAILED: RIGHT MID-UPPER BACK
LOCATION DETAILED: RIGHT SUPERIOR UPPER BACK

## 2024-07-02 ASSESSMENT — LOCATION SIMPLE DESCRIPTION DERM: LOCATION SIMPLE: RIGHT UPPER BACK

## 2024-07-15 ENCOUNTER — HOSPITAL ENCOUNTER (OUTPATIENT)
Dept: RADIOLOGY | Facility: MEDICAL CENTER | Age: 69
End: 2024-07-15
Attending: NURSE PRACTITIONER
Payer: MEDICARE

## 2024-07-15 DIAGNOSIS — N95.9 POST MENOPAUSAL PROBLEMS: ICD-10-CM

## 2024-07-15 DIAGNOSIS — Z12.31 ENCOUNTER FOR SCREENING MAMMOGRAM FOR MALIGNANT NEOPLASM OF BREAST: ICD-10-CM

## 2024-07-15 PROCEDURE — 77063 BREAST TOMOSYNTHESIS BI: CPT

## 2024-07-15 PROCEDURE — 77080 DXA BONE DENSITY AXIAL: CPT

## 2024-07-19 DIAGNOSIS — E03.9 ACQUIRED HYPOTHYROIDISM: ICD-10-CM

## 2024-07-21 RX ORDER — LEVOTHYROXINE SODIUM 137 UG/1
TABLET ORAL
Qty: 30 TABLET | Refills: 0 | Status: SHIPPED | OUTPATIENT
Start: 2024-07-21

## 2024-08-05 ENCOUNTER — HOSPITAL ENCOUNTER (OUTPATIENT)
Dept: LAB | Facility: MEDICAL CENTER | Age: 69
End: 2024-08-05
Attending: NURSE PRACTITIONER
Payer: MEDICARE

## 2024-08-05 ENCOUNTER — APPOINTMENT (OUTPATIENT)
Dept: MEDICAL GROUP | Facility: MEDICAL CENTER | Age: 69
End: 2024-08-05
Payer: MEDICARE

## 2024-08-05 DIAGNOSIS — R94.6 ABNORMAL THYROID FUNCTION TEST: ICD-10-CM

## 2024-08-05 DIAGNOSIS — R79.89 ELEVATED LFTS: ICD-10-CM

## 2024-08-05 LAB
ALBUMIN SERPL BCP-MCNC: 4.1 G/DL (ref 3.2–4.9)
ALBUMIN/GLOB SERPL: 1.4 G/DL
ALP SERPL-CCNC: 79 U/L (ref 30–99)
ALT SERPL-CCNC: 24 U/L (ref 2–50)
ANION GAP SERPL CALC-SCNC: 10 MMOL/L (ref 7–16)
AST SERPL-CCNC: 20 U/L (ref 12–45)
BILIRUB SERPL-MCNC: 0.8 MG/DL (ref 0.1–1.5)
BUN SERPL-MCNC: 10 MG/DL (ref 8–22)
CALCIUM ALBUM COR SERPL-MCNC: 9.3 MG/DL (ref 8.5–10.5)
CALCIUM SERPL-MCNC: 9.4 MG/DL (ref 8.4–10.2)
CHLORIDE SERPL-SCNC: 101 MMOL/L (ref 96–112)
CO2 SERPL-SCNC: 26 MMOL/L (ref 20–33)
CREAT SERPL-MCNC: 0.7 MG/DL (ref 0.5–1.4)
FASTING STATUS PATIENT QL REPORTED: NORMAL
GFR SERPLBLD CREATININE-BSD FMLA CKD-EPI: 94 ML/MIN/1.73 M 2
GLOBULIN SER CALC-MCNC: 3 G/DL (ref 1.9–3.5)
GLUCOSE SERPL-MCNC: 102 MG/DL (ref 65–99)
POTASSIUM SERPL-SCNC: 4.3 MMOL/L (ref 3.6–5.5)
PROT SERPL-MCNC: 7.1 G/DL (ref 6–8.2)
SODIUM SERPL-SCNC: 137 MMOL/L (ref 135–145)
T3FREE SERPL-MCNC: 2.95 PG/ML (ref 2–4.4)
T4 FREE SERPL-MCNC: 1.43 NG/DL (ref 0.93–1.7)
THYROPEROXIDASE AB SERPL-ACNC: 16 IU/ML (ref 0–9)
TSH SERPL DL<=0.005 MIU/L-ACNC: 1.33 UIU/ML (ref 0.38–5.33)

## 2024-08-05 PROCEDURE — 86376 MICROSOMAL ANTIBODY EACH: CPT

## 2024-08-05 PROCEDURE — 36415 COLL VENOUS BLD VENIPUNCTURE: CPT

## 2024-08-05 PROCEDURE — 84439 ASSAY OF FREE THYROXINE: CPT

## 2024-08-05 PROCEDURE — 84443 ASSAY THYROID STIM HORMONE: CPT

## 2024-08-05 PROCEDURE — 84481 FREE ASSAY (FT-3): CPT

## 2024-08-05 PROCEDURE — 80053 COMPREHEN METABOLIC PANEL: CPT

## 2024-08-07 ENCOUNTER — APPOINTMENT (OUTPATIENT)
Dept: MEDICAL GROUP | Facility: MEDICAL CENTER | Age: 69
End: 2024-08-07
Payer: MEDICARE

## 2024-08-07 VITALS
BODY MASS INDEX: 35.65 KG/M2 | TEMPERATURE: 97.4 F | HEIGHT: 65 IN | OXYGEN SATURATION: 98 % | HEART RATE: 58 BPM | DIASTOLIC BLOOD PRESSURE: 64 MMHG | WEIGHT: 214 LBS | SYSTOLIC BLOOD PRESSURE: 118 MMHG

## 2024-08-07 DIAGNOSIS — R73.01 IFG (IMPAIRED FASTING GLUCOSE): ICD-10-CM

## 2024-08-07 DIAGNOSIS — E03.9 ACQUIRED HYPOTHYROIDISM: ICD-10-CM

## 2024-08-07 DIAGNOSIS — I10 ESSENTIAL HYPERTENSION: Chronic | ICD-10-CM

## 2024-08-07 DIAGNOSIS — B37.9 CANDIDIASIS: ICD-10-CM

## 2024-08-07 PROCEDURE — 3074F SYST BP LT 130 MM HG: CPT | Performed by: NURSE PRACTITIONER

## 2024-08-07 PROCEDURE — 3078F DIAST BP <80 MM HG: CPT | Performed by: NURSE PRACTITIONER

## 2024-08-07 PROCEDURE — 99214 OFFICE O/P EST MOD 30 MIN: CPT | Performed by: NURSE PRACTITIONER

## 2024-08-07 RX ORDER — KETOCONAZOLE 20 MG/G
1 CREAM TOPICAL 2 TIMES DAILY
Qty: 30 G | Refills: 1 | Status: SHIPPED | OUTPATIENT
Start: 2024-08-07

## 2024-08-07 RX ORDER — LEVOTHYROXINE SODIUM 137 UG/1
137 TABLET ORAL
Qty: 90 TABLET | Refills: 3 | Status: SHIPPED | OUTPATIENT
Start: 2024-08-07

## 2024-08-07 ASSESSMENT — FIBROSIS 4 INDEX: FIB4 SCORE: 1.166423687039608619

## 2024-08-07 NOTE — PROGRESS NOTES
Subjective:     History of Present Illness  The patient is a 69-year-old female seen in follow-up.    She is seeking a refill of her ketoconazole prescription.  She uses occas only for yeast rash.  No current sx. Last refill 2022.     Recently, she has been experiencing low energy levels, which she attributes to her thyroid condition. She believes her previous medication, leflunomide, may have adversely affected her lab results for thyroid. She discontinued leflunomide after her last consultation. She reports no issues with bowel movements or bladder function, and denies experiencing chest pain.  Her last thyroid lab showed low TSH and her levothyroxine was decreased.  She now feels that she needs to increase her dose.    She has recently increased her sugar intake.  She has not been exercising or eating healthy.  Her last a1c was up from 5.8 to 6.1.  she is not on med for DM.     BP is stable and well controlled on lisinopril.  Stable on med.  No s/e to med.  Taking med approp.    SOCIAL HISTORY  She does not smoke.    IMMUNIZATIONS  She had Prevnar 13 in 2021 and PPV 23 in 2022.      Lab reviewed with pt:  CMP - wnl  GFR - wnl  TSH, T4, T3 - wnl  TPO - mildly elevated chronically.     Current medicines (including changes today)  Current Outpatient Medications   Medication Sig Dispense Refill    ketoconazole (NIZORAL) 2 % Cream Apply 1 Application  topically 2 times a day. 30 g 1    levothyroxine (SYNTHROID) 137 MCG Tab Take 1 Tablet by mouth every morning on an empty stomach. TAKE 1 TABLET BY MOUTH DAILY FOR 6 DAYS PER WEEK AND TAKE 1 AND 1/2 TABLET BY MOUTH DAILY FOR 1 DAY PER WEEK 90 Tablet 3    lisinopril (PRINIVIL) 10 MG Tab TAKE 1 TABLET BY MOUTH TWICE A  Tablet 0    rosuvastatin (CRESTOR) 10 MG Tab TAKE ONE TABLET BY MOUTH EVERY EVENING 90 Tablet 0    scopolamine (TRANSDERM SCOP, 1.5 MG,) 1 mg/72hr PATCH 72 HR Place 1 Patch on the skin every 72 hours. 12 Patch 3    albuterol 108 (90 Base) MCG/ACT Aero  Soln inhalation aerosol Inhale 2 Puffs every 6 hours as needed for Shortness of Breath. 8.5 g 1    acyclovir (ZOVIRAX) 400 MG tablet Take 1 Tablet by mouth 2 times a day. 180 Tablet 3    hydroxychloroquine (PLAQUENIL) 200 MG Tab Take 1 Tablet by mouth 2 times a day. 180 Tablet 1    dicyclomine (BENTYL) 10 MG Cap       Magnesium 500 MG Cap       aspirin 81 MG EC tablet Take 81 mg by mouth every day.      B Complex Cap Take 1 Cap by mouth every day. 30 Cap 0    OMEPRAZOLE 20 MG PO CPDR Take 20 mg by mouth as needed (For heartburn).  11     No current facility-administered medications for this visit.     She  has a past medical history of Adenomatous polyp of colon (04/22/2009), Allergic rhinitis, Carpal tunnel syndrome (04/22/2009), Diverticulitis (04/22/2009), GERD (gastroesophageal reflux disease) (04/22/2009), Hyperlipidemia LDL goal <70 (04/22/2009), Hypertension, Hypothyroidism, Impaired fasting glucose (04/22/2009), NSTEMI (non-ST elevated myocardial infarction) ([facility]) (01/17/2023), Obesity (BMI 30-39.9) (07/13/2017), Other microscopic colitis (01/17/2023), Pruritic dermatitis (04/22/2009), Pulmonary nodule (04/22/2009), Rheumatoid arthritis ([facility]), Sjogren's syndrome ([facility]), Syncope, and Vertigo (03/13/2024).        Thyroid:  Lab Results   Component Value Date/Time    TSHULTRASEN 1.330 08/05/2024 0729     Lab Results   Component Value Date/Time    FREET4 1.43 08/05/2024 0729     Complete Metabolic Panel:  Lab Results   Component Value Date/Time    SODIUM 137 08/05/2024 07:29 AM    POTASSIUM 4.3 08/05/2024 07:29 AM    CHLORIDE 101 08/05/2024 07:29 AM    CO2 26 08/05/2024 07:29 AM    ANION 10.0 08/05/2024 07:29 AM    GLUCOSE 102 (H) 08/05/2024 07:29 AM    BUN 10 08/05/2024 07:29 AM    CREATININE 0.70 08/05/2024 07:29 AM    CREATININE 0.83 03/29/2011 07:30 AM    ASTSGOT 20 08/05/2024 07:29 AM    ALTSGPT 24 08/05/2024 07:29 AM    TBILIRUBIN 0.8 08/05/2024 07:29 AM    ALBUMIN 4.1 08/05/2024 07:29 AM    ALBUMIN 3.95  "07/28/2023 07:07 AM    TOTPROTEIN 7.1 08/05/2024 07:29 AM    TOTPROTEIN 6.8 07/28/2023 07:07 AM    GLOBULIN 3.0 08/05/2024 07:29 AM    AGRATIO 1.4 08/05/2024 07:29 AM           GFR:    Lab Results   Component Value Date/Time    GFRCKD 94 08/05/2024 0729           ROS   Review of Systems   Constitutional: Negative.  Negative for fever, chills, weight loss,  diaphoresis.   HENT: Negative.  Negative for hearing loss, ear pain, nosebleeds, congestion, sore throat, neck pain, tinnitus and ear discharge.    Respiratory: Negative.  Negative for cough, hemoptysis, sputum production, shortness of breath, wheezing and stridor.    Cardiovascular: Negative.  Negative for chest pain, palpitations, orthopnea, claudication, leg swelling and PND.   Gastrointestinal: denies nausea, vomiting, diarrhea, constipation, heartburn, melena or hematochezia.  Genitourinary: Denies dysuria, hematuria, urinary incontinence, frequency or urgency.    Musculoskeletal: Negative.  Negative for myalgias and back pain.   Neurological: Negative.  Negative for dizziness, tingling, tremors, weakness and headaches.   Psych:  Denies depression, anxiety or insomnia.  All other systems reviewed and are negative.         Objective:     /64 (BP Location: Left arm, Patient Position: Sitting, BP Cuff Size: Adult)   Pulse (!) 58   Temp 36.3 °C (97.4 °F) (Temporal)   Ht 1.651 m (5' 5\")   Wt 97.1 kg (214 lb)   SpO2 98%  Body mass index is 35.61 kg/m².   Physical Exam    Physical Exam   Vitals reviewed.  Constitutional: oriented to person, place, and time. appears well-developed and well-nourished. No distress.   Neck: No JVD present.  Cardiovascular: Normal rate, regular rhythm, normal heart sounds and intact distal pulses.  Exam reveals no gallop and no friction rub.  No murmur heard.  No carotid bruits.   Pulmonary/Chest: Effort normal and breath sounds normal. No stridor. No respiratory distress. no wheezes or rales. exhibits no tenderness. "   Musculoskeletal: Normal range of motion. exhibits no edema. norma pedal pulses 2+.  Lymphadenopathy: no cervical or supraclavicular adenopathy.   Neurological: alert and oriented to person, place, and time. exhibits normal muscle tone. Coordination normal.   Skin: Skin is warm and dry. no diaphoresis.   Psychiatric: normal mood and affect. behavior is normal.           Assessment and Plan:   The following treatment plan was discussed      Assessment & Plan  1. Hypothyroidism with last lab showing abnormal thyroid function test.  Her thyroid lab is now within normal limits except for a mildly elevated TPO, which we will monitor. She does feel that she needs to go up on her levothyroxine. Prior to her last change, she was on 1 tablet 6 times a week and 1.5 tablet once a week, then she was taken down to 1 tablet 6 times a week and 0.5 tablet once a week, which she has been on for the past several months. She does feel that she needs just a little bit more thyroid medication. I will go to 1 tablet daily. I will plan on repeating her lab in 2 months. She will email me for a lab slip.    2. Impaired fasting glucose.  Her last A1c in 03/2024 was slightly elevated at 6.1. She has not been on a healthy diet or exercising regularly. She plans to improve her lifestyle. will do her A1c with her lab she is doing for rheumatology in 09/2024. I will follow up with patient with the results. She does not need to follow up as long as her A1c is less than 7.    3. Hypertension.  She is stable and well controlled on lisinopril. I refilled medication.    4. Yeast rashes.  She uses ketoconazole cream as needed for occasional yeast rashes and needs a refill on that medication.    Follow-up  The patient will follow up in 03/2025 unless upcoming lab is abnormal.      ORDERS:  1. Candidiasis    - ketoconazole (NIZORAL) 2 % Cream; Apply 1 Application  topically 2 times a day.  Dispense: 30 g; Refill: 1    2. Acquired hypothyroidism    -  levothyroxine (SYNTHROID) 137 MCG Tab; Take 1 Tablet by mouth every morning on an empty stomach. TAKE 1 TABLET BY MOUTH DAILY FOR 6 DAYS PER WEEK AND TAKE 1 AND 1/2 TABLET BY MOUTH DAILY FOR 1 DAY PER WEEK  Dispense: 90 Tablet; Refill: 3    3. Essential hypertension      4. IFG (impaired fasting glucose)    - HEMOGLOBIN A1C; Future          Please note that this dictation was created using voice recognition software. I have made every reasonable attempt to correct obvious errors, but I expect that there are errors of grammar and possibly content that I did not discover before finalizing the note.      Attestation      Verbal consent was acquired by the patient to use [employer] ambient listening note generation during this visit Yes

## 2024-09-04 NOTE — PROGRESS NOTES
Subjective     Elizabeth Nuñez is a 68 y.o. female who presents for lab review.      HPI    She presents for lab review. Discussed elevated HgbA1C 5.8%, Glucose 103, and LDL of 75. Goal < 70.  Patient states she has not been the greatest with her diet and exercise. States she loves her candy and has a hard time with reducing her sugar intake. Patient aware that she needs to reduce her sugar. Says she is concerned to be starting on medication. States RA has improved. Not having much pain. Taking medications appropriately. X-rays and repeat lab in January. Sees rheum in February. Will continue to follow with Cardiology.     Reports SOB w/exertion with walking and when she runs to the appointment. Denies chest pain/pressure. Denies pains otherwise. Patient would like to get her flu vaccine and COVID vaccine at [facility].     Patient Active Problem List   Diagnosis    Hypothyroid    Pulmonary nodule    Hyperlipidemia LDL goal <70    Pruritic dermatitis    History of diverticulitis of colon    Carpal tunnel syndrome    Adenomatous polyp of colon    GERD (gastroesophageal reflux disease)    Syncope    Hypertension    Allergy to pollen    Obesity (BMI 30-39.9)    IFG (impaired fasting glucose)    Sjogren's syndrome (HCC)    Rheumatoid arthritis (HCC)    NSTEMI (non-ST elevated myocardial infarction) (HCC)    Other microscopic colitis      Current Outpatient Medications   Medication Sig Dispense Refill    hydroxychloroquine (PLAQUENIL) 200 MG Tab Take 1 Tablet by mouth 2 times a day. 180 Tablet 3    rosuvastatin (CRESTOR) 5 MG Tab Take 1 Tablet by mouth every day. 90 Tablet 1    levothyroxine (SYNTHROID) 137 MCG Tab Take 1 tab daily 6x/wk and 1.5 tab daily 1x/wk 90 Tablet 3    acyclovir (ZOVIRAX) 400 MG tablet TAKE ONE TABLET BY MOUTH TWICE A  Tablet 1    dicyclomine (BENTYL) 10 MG Cap       lisinopril (PRINIVIL) 10 MG Tab Take 1 Tablet by mouth 2 times a day. TAKE 1 TABLET BY MOUTH TWICE A  Tablet 0     Magnesium 500 MG Cap       aspirin 81 MG EC tablet Take 81 mg by mouth every day.      clopidogrel (PLAVIX) 75 MG Tab Take 75 mg by mouth every day.      ketoconazole (NIZORAL) 2 % Cream Apply 1 Application topically 2 times a day. 30 g 1    albuterol 108 (90 Base) MCG/ACT Aero Soln inhalation aerosol Inhale 2 Puffs every 6 hours as needed for Shortness of Breath. 8.5 g 1    Calcium Carb-Cholecalciferol (CALCIUM 500 +D PO) Take 3 Tabs by mouth every evening.      B Complex Cap Take 1 Cap by mouth every day. 30 Cap 0    OMEPRAZOLE 20 MG PO CPDR Take 20 mg by mouth as needed (For heartburn).  11     No current facility-administered medications for this visit.        Allergies   Allergen Reactions    Misc. Sulfonamide Containing Compounds Hives and Itching    Morphine Hives and Itching    Sulfa Drugs Hives and Itching    Quinine Hives     Hives  Hives  Hives        ROS  ROS  Constitutional: Negative. Negative for fever, chills, weight loss, malaise/fatigue and diaphoresis.   HENT: Negative. Negative for hearing loss, ear pain, nosebleeds, congestion, sore throat, neck pain, tinnitus and ear discharge.   Respiratory: Negative. Negative for cough, hemoptysis, sputum production, wheezing and stridor.   Cardiovascular: Negative. Negative for chest pain, palpitations, orthopnea, claudication, leg swelling and PND.   Gastrointestinal: Denies nausea, vomiting, diarrhea, constipation, heartburn, melena or hematochezia.  Genitourinary: Denies dysuria, hematuria, urinary incontinence, frequency or urgency.        Objective     Physical Exam:  Vitals reviewed.  Constitutional: Oriented to person, place, and time. appears well-developed and well-nourished. No distress.   Cardiovascular: Normal rate, regular rhythm, normal heart sounds and intact distal pulses. Exam reveals no gallop and no friction rub. No murmur heard. No carotid bruits.   Pulmonary/Chest: Effort normal and breath sounds normal. No stridor. No respiratory  [FreeTextEntry1] : PCP/Referring Doctor: Chief Complaint: " " ------------------------------------------------------------------------------------------------------------------------------------ History of Present Illness:   ROS:  chest pain (-), dyspnea with exertion (-), orthopnea (-), edema (-), palpitations (-), dizziness (-) All other systems were reviewed and are negative. ------------------------------------------------------------------------------------------------------------------------------------ Past Medical History: Any history of HTN? No/Yes: Any history of Lipid disorders? No/Yes: TG LDL Any history Diabetes or Prediabetes? No/Yes: HbA1c Any history of MI, stroke or TIA? No/Yes: Any prior Stress Testing? No/Yes: Any prior Cath/Angiogram procedure? No/Yes: Any prior Echocardiogram (TTE)? No/Yes: Any prior vascular study? No/Yes: Have patient been on blood thinners? No/Yes: Have patient had cardiac or vascular surgeries? No/Yes: ------------------------------------------------------------------------------------------------------------------------------------ Patient's current cardiovascular medications were reviewed and updated in chart. ------------------------------------------------------------------------------------------------------------------------------------ Family history Do you have a family history of heart attacks and/or stroke before the age of 60? No/Yes Do you have a family history of cardiac arrest? ------------------------------------------------------------------------------------------------------------------------------------ Social History: Do you currently or previously used tobacco including cigarettes and vapes? No/Yes - How many alcoholic drinks per week? 0-3, 3-7, >8 What is your occupation? works as/ not employed/retired ------------------------------------------------------------------------------------------------------------------------------------ Physical Exam: General appearance: NAD Lungs: CTAB CV: RRR Extremities: No peripheral edema.    distress. no wheezes or rales. exhibits no tenderness.   Musculoskeletal: Normal range of motion. exhibits no edema. norma pedal pulses 2+.  Lymphadenopathy: No cervical or supraclavicular adenopathy.   Neurological: Alert and oriented to person, place, and time. exhibits normal muscle tone.  Skin: Skin is warm and dry. No diaphoresis.   Psychiatric: Normal mood and affect. Behavior is normal.     Assessment & Plan     The following treatment plan was discussed:     No diagnosis found.     Followup:   1. Hyperlipidemia LDL goal <70  Comp Metabolic Panel    Lipid Profile    Patient states she is well aware that she is not the greatest at keeping a healthy diet and exercise. Repeat lipid labs for January ordered.      2. IFG (impaired fasting glucose)  HEMOGLOBIN A1C    Hgb A1C 5.8%. Reports she has not been well compliant with a healthy diet and exercise. Patient states she will work on diet and exercise. Repeat labs for Jan.         Follow up in 4 months for labs review.

## 2024-09-06 NOTE — TELEPHONE ENCOUNTER
Received request via: Pharmacy    Was the patient seen in the last year in this department? Yes    Does the patient have an active prescription (recently filled or refills available) for medication(s) requested? No    Pharmacy Name: [facility]     Does the patient have [facility] Plus and need 100-day supply? (This applies to ALL medications) Patient does not have SCP

## 2024-09-11 ENCOUNTER — HOSPITAL ENCOUNTER (OUTPATIENT)
Dept: LAB | Facility: MEDICAL CENTER | Age: 69
End: 2024-09-11
Attending: NURSE PRACTITIONER
Payer: MEDICARE

## 2024-09-11 LAB
ALBUMIN SERPL BCP-MCNC: 4 G/DL (ref 3.2–4.9)
ALBUMIN/GLOB SERPL: 1.4 G/DL
ALP SERPL-CCNC: 72 U/L (ref 30–99)
ALT SERPL-CCNC: 21 U/L (ref 2–50)
ANION GAP SERPL CALC-SCNC: 9 MMOL/L (ref 7–16)
APPEARANCE UR: CLEAR
AST SERPL-CCNC: 19 U/L (ref 12–45)
BASOPHILS # BLD AUTO: 0.9 % (ref 0–1.8)
BASOPHILS # BLD: 0.06 K/UL (ref 0–0.12)
BILIRUB SERPL-MCNC: 0.6 MG/DL (ref 0.1–1.5)
BILIRUB UR QL STRIP.AUTO: NEGATIVE
BUN SERPL-MCNC: 12 MG/DL (ref 8–22)
C3 SERPL-MCNC: 125 MG/DL (ref 87–200)
C4 SERPL-MCNC: 21.6 MG/DL (ref 19–52)
CALCIUM ALBUM COR SERPL-MCNC: 9.4 MG/DL (ref 8.5–10.5)
CALCIUM SERPL-MCNC: 9.4 MG/DL (ref 8.4–10.2)
CHLORIDE SERPL-SCNC: 103 MMOL/L (ref 96–112)
CO2 SERPL-SCNC: 27 MMOL/L (ref 20–33)
COLOR UR: YELLOW
CREAT SERPL-MCNC: 0.72 MG/DL (ref 0.5–1.4)
CREAT UR-MCNC: 136.21 MG/DL
CRP SERPL HS-MCNC: 0.52 MG/DL (ref 0–0.75)
EOSINOPHIL # BLD AUTO: 0.22 K/UL (ref 0–0.51)
EOSINOPHIL NFR BLD: 3.4 % (ref 0–6.9)
ERYTHROCYTE [DISTWIDTH] IN BLOOD BY AUTOMATED COUNT: 47.5 FL (ref 35.9–50)
ERYTHROCYTE [SEDIMENTATION RATE] IN BLOOD BY WESTERGREN METHOD: 7 MM/HOUR (ref 0–25)
GFR SERPLBLD CREATININE-BSD FMLA CKD-EPI: 91 ML/MIN/1.73 M 2
GLOBULIN SER CALC-MCNC: 2.8 G/DL (ref 1.9–3.5)
GLUCOSE SERPL-MCNC: 95 MG/DL (ref 65–99)
GLUCOSE UR STRIP.AUTO-MCNC: NEGATIVE MG/DL
HCT VFR BLD AUTO: 46 % (ref 37–47)
HGB BLD-MCNC: 15.1 G/DL (ref 12–16)
IMM GRANULOCYTES # BLD AUTO: 0.01 K/UL (ref 0–0.11)
IMM GRANULOCYTES NFR BLD AUTO: 0.2 % (ref 0–0.9)
KETONES UR STRIP.AUTO-MCNC: NEGATIVE MG/DL
LEUKOCYTE ESTERASE UR QL STRIP.AUTO: NEGATIVE
LYMPHOCYTES # BLD AUTO: 1.65 K/UL (ref 1–4.8)
LYMPHOCYTES NFR BLD: 25.2 % (ref 22–41)
MCH RBC QN AUTO: 30 PG (ref 27–33)
MCHC RBC AUTO-ENTMCNC: 32.8 G/DL (ref 32.2–35.5)
MCV RBC AUTO: 91.5 FL (ref 81.4–97.8)
MICRO URNS: NORMAL
MONOCYTES # BLD AUTO: 0.71 K/UL (ref 0–0.85)
MONOCYTES NFR BLD AUTO: 10.8 % (ref 0–13.4)
NEUTROPHILS # BLD AUTO: 3.91 K/UL (ref 1.82–7.42)
NEUTROPHILS NFR BLD: 59.5 % (ref 44–72)
NITRITE UR QL STRIP.AUTO: NEGATIVE
NRBC # BLD AUTO: 0 K/UL
NRBC BLD-RTO: 0 /100 WBC (ref 0–0.2)
PH UR STRIP.AUTO: 7 [PH] (ref 5–8)
PLATELET # BLD AUTO: 272 K/UL (ref 164–446)
PMV BLD AUTO: 9.4 FL (ref 9–12.9)
POTASSIUM SERPL-SCNC: 4.9 MMOL/L (ref 3.6–5.5)
PROT SERPL-MCNC: 6.8 G/DL (ref 6–8.2)
PROT UR QL STRIP: NEGATIVE MG/DL
PROT UR-MCNC: 11 MG/DL (ref 0–15)
PROT/CREAT UR: 81 MG/G (ref 10–107)
RBC # BLD AUTO: 5.03 M/UL (ref 4.2–5.4)
RBC UR QL AUTO: NEGATIVE
SODIUM SERPL-SCNC: 139 MMOL/L (ref 135–145)
SP GR UR STRIP.AUTO: 1.02
WBC # BLD AUTO: 6.6 K/UL (ref 4.8–10.8)

## 2024-09-11 PROCEDURE — 85025 COMPLETE CBC W/AUTO DIFF WBC: CPT

## 2024-09-11 PROCEDURE — 82570 ASSAY OF URINE CREATININE: CPT

## 2024-09-11 PROCEDURE — 86160 COMPLEMENT ANTIGEN: CPT

## 2024-09-11 PROCEDURE — 81003 URINALYSIS AUTO W/O SCOPE: CPT

## 2024-09-11 PROCEDURE — 84156 ASSAY OF PROTEIN URINE: CPT

## 2024-09-11 PROCEDURE — 86140 C-REACTIVE PROTEIN: CPT

## 2024-09-11 PROCEDURE — 36415 COLL VENOUS BLD VENIPUNCTURE: CPT

## 2024-09-11 PROCEDURE — 80053 COMPREHEN METABOLIC PANEL: CPT

## 2024-09-11 PROCEDURE — 85652 RBC SED RATE AUTOMATED: CPT

## 2024-09-13 NOTE — TELEPHONE ENCOUNTER
Received request via: Pharmacy    Was the patient seen in the last year in this department? Yes    Does the patient have an active prescription (recently filled or refills available) for medication(s) requested? No    Pharmacy Name: [id number] [place], [place] - [address]     Does the patient have [facility] Plus and need 100-day supply? (This applies to ALL medications) Patient does not have SCP

## 2024-09-13 NOTE — TELEPHONE ENCOUNTER
Caller Name: Elizabeth Nuñez  Call Back Number: [phone]    How would the patient prefer to be contacted with a response: Phone call do NOT leave a detailed message    pt states Rovastatin prescription needs to be refilled and also states that they havent had the medication for over a week. Would like it to be sent to  Pharmacy:   [facility] [id number] - [place], [place] - [address]

## 2024-09-15 RX ORDER — ROSUVASTATIN CALCIUM 10 MG/1
10 TABLET, COATED ORAL EVERY EVENING
Qty: 90 TABLET | Refills: 2 | Status: SHIPPED | OUTPATIENT
Start: 2024-09-15

## 2024-09-15 RX ORDER — ROSUVASTATIN CALCIUM 10 MG/1
10 TABLET, COATED ORAL EVERY EVENING
Qty: 90 TABLET | Refills: 2 | Status: SHIPPED | OUTPATIENT
Start: 2024-09-15 | End: 2024-09-17 | Stop reason: SDUPTHER

## 2024-09-17 RX ORDER — ROSUVASTATIN CALCIUM 10 MG/1
10 TABLET, COATED ORAL EVERY EVENING
Qty: 90 TABLET | Refills: 2 | Status: SHIPPED | OUTPATIENT
Start: 2024-09-17

## 2024-09-19 DIAGNOSIS — I10 ESSENTIAL HYPERTENSION: Chronic | ICD-10-CM

## 2024-09-19 NOTE — TELEPHONE ENCOUNTER
Received request via: Pharmacy    Was the patient seen in the last year in this department? Yes    Does the patient have an active prescription (recently filled or refills available) for medication(s) requested? No    Pharmacy Name: [facility]    Does the patient have long-term Plus and need 100-day supply? (This applies to ALL medications) Patient does not have SCP

## 2024-09-22 RX ORDER — LISINOPRIL 10 MG/1
10 TABLET ORAL 2 TIMES DAILY
Qty: 180 TABLET | Refills: 3 | Status: SHIPPED | OUTPATIENT
Start: 2024-09-22

## 2024-10-13 ENCOUNTER — OFFICE VISIT (OUTPATIENT)
Dept: URGENT CARE | Facility: CLINIC | Age: 69
End: 2024-10-13
Payer: MEDICARE

## 2024-10-13 VITALS
BODY MASS INDEX: 35.65 KG/M2 | HEART RATE: 76 BPM | WEIGHT: 214 LBS | DIASTOLIC BLOOD PRESSURE: 78 MMHG | TEMPERATURE: 97.1 F | SYSTOLIC BLOOD PRESSURE: 142 MMHG | RESPIRATION RATE: 14 BRPM | OXYGEN SATURATION: 95 % | HEIGHT: 65 IN

## 2024-10-13 DIAGNOSIS — E03.9 ACQUIRED HYPOTHYROIDISM: ICD-10-CM

## 2024-10-13 DIAGNOSIS — R94.6 ABNORMAL THYROID FUNCTION TEST: ICD-10-CM

## 2024-10-13 DIAGNOSIS — L03.114 CELLULITIS OF LEFT HAND: ICD-10-CM

## 2024-10-13 DIAGNOSIS — R03.0 ELEVATED BLOOD PRESSURE READING: ICD-10-CM

## 2024-10-13 DIAGNOSIS — I10 ESSENTIAL HYPERTENSION: ICD-10-CM

## 2024-10-13 DIAGNOSIS — E55.9 VITAMIN D DEFICIENCY: ICD-10-CM

## 2024-10-13 DIAGNOSIS — R73.01 IFG (IMPAIRED FASTING GLUCOSE): ICD-10-CM

## 2024-10-13 PROBLEM — I25.2 H/O NON-ST ELEVATION MYOCARDIAL INFARCTION (NSTEMI): Status: ACTIVE | Noted: 2024-03-20

## 2024-10-13 PROBLEM — R93.3 ABNORMAL FINDINGS ON DIAGNOSTIC IMAGING OF OTHER PARTS OF DIGESTIVE TRACT: Status: ACTIVE | Noted: 2017-03-24

## 2024-10-13 PROBLEM — K64.9 HEMORRHOIDS: Status: ACTIVE | Noted: 2023-06-20

## 2024-10-13 PROBLEM — K44.9 DIAPHRAGMATIC HERNIA WITHOUT OBSTRUCTION OR GANGRENE: Status: ACTIVE | Noted: 2023-04-26

## 2024-10-13 PROBLEM — K57.30 DIVERTICULOSIS OF COLON: Status: ACTIVE | Noted: 2023-06-20

## 2024-10-13 PROBLEM — E78.2 MIXED HYPERLIPIDEMIA: Status: ACTIVE | Noted: 2023-06-20

## 2024-10-13 PROBLEM — I25.10 CAD IN NATIVE ARTERY: Status: ACTIVE | Noted: 2024-03-20

## 2024-10-13 PROBLEM — E78.00 HIGH CHOLESTEROL: Status: ACTIVE | Noted: 2023-06-20

## 2024-10-13 PROBLEM — R06.09 DYSPNEA ON EXERTION: Status: ACTIVE | Noted: 2023-08-30

## 2024-10-13 PROBLEM — R73.03 PREDIABETES: Status: ACTIVE | Noted: 2024-03-20

## 2024-10-13 PROBLEM — R12 HEARTBURN: Status: ACTIVE | Noted: 2023-06-20

## 2024-10-13 PROBLEM — Z86.0100 HISTORY OF COLONIC POLYPS: Status: ACTIVE | Noted: 2023-06-20

## 2024-10-13 PROBLEM — R10.30 LOWER ABDOMINAL PAIN: Status: ACTIVE | Noted: 2023-06-20

## 2024-10-13 PROBLEM — I34.0 MILD MITRAL REGURGITATION: Status: ACTIVE | Noted: 2024-10-09

## 2024-10-13 PROCEDURE — 3077F SYST BP >= 140 MM HG: CPT | Performed by: NURSE PRACTITIONER

## 2024-10-13 PROCEDURE — 99213 OFFICE O/P EST LOW 20 MIN: CPT | Performed by: NURSE PRACTITIONER

## 2024-10-13 PROCEDURE — 3078F DIAST BP <80 MM HG: CPT | Performed by: NURSE PRACTITIONER

## 2024-10-13 RX ORDER — CEPHALEXIN 500 MG/1
500 CAPSULE ORAL 4 TIMES DAILY
Qty: 20 CAPSULE | Refills: 0 | Status: SHIPPED | OUTPATIENT
Start: 2024-10-13 | End: 2024-10-18

## 2024-10-13 ASSESSMENT — FIBROSIS 4 INDEX: FIB4 SCORE: 1.036534978620963811

## 2024-10-24 DIAGNOSIS — I10 ESSENTIAL HYPERTENSION: Chronic | ICD-10-CM

## 2024-10-24 RX ORDER — LISINOPRIL 10 MG/1
15 TABLET ORAL 2 TIMES DAILY
Qty: 270 TABLET | Refills: 0 | Status: SHIPPED | OUTPATIENT
Start: 2024-10-24

## 2024-11-20 ENCOUNTER — HOSPITAL ENCOUNTER (OUTPATIENT)
Dept: LAB | Facility: MEDICAL CENTER | Age: 69
End: 2024-11-20
Attending: NURSE PRACTITIONER
Payer: MEDICARE

## 2024-11-20 DIAGNOSIS — E03.9 ACQUIRED HYPOTHYROIDISM: ICD-10-CM

## 2024-11-20 DIAGNOSIS — R94.6 ABNORMAL THYROID FUNCTION TEST: ICD-10-CM

## 2024-11-20 DIAGNOSIS — I10 ESSENTIAL HYPERTENSION: ICD-10-CM

## 2024-11-20 DIAGNOSIS — E55.9 VITAMIN D DEFICIENCY: ICD-10-CM

## 2024-11-20 DIAGNOSIS — R73.01 IFG (IMPAIRED FASTING GLUCOSE): ICD-10-CM

## 2024-11-20 LAB
25(OH)D3 SERPL-MCNC: 33 NG/ML (ref 30–100)
ALBUMIN SERPL BCP-MCNC: 3.7 G/DL (ref 3.2–4.9)
ALBUMIN/GLOB SERPL: 1.2 G/DL
ALP SERPL-CCNC: 68 U/L (ref 30–99)
ALT SERPL-CCNC: 22 U/L (ref 2–50)
ANION GAP SERPL CALC-SCNC: 12 MMOL/L (ref 7–16)
AST SERPL-CCNC: 21 U/L (ref 12–45)
BILIRUB SERPL-MCNC: 0.7 MG/DL (ref 0.1–1.5)
BUN SERPL-MCNC: 10 MG/DL (ref 8–22)
CALCIUM ALBUM COR SERPL-MCNC: 9.6 MG/DL (ref 8.5–10.5)
CALCIUM SERPL-MCNC: 9.4 MG/DL (ref 8.4–10.2)
CHLORIDE SERPL-SCNC: 103 MMOL/L (ref 96–112)
CO2 SERPL-SCNC: 25 MMOL/L (ref 20–33)
CREAT SERPL-MCNC: 0.58 MG/DL (ref 0.5–1.4)
CREAT UR-MCNC: 92.39 MG/DL
EST. AVERAGE GLUCOSE BLD GHB EST-MCNC: 123 MG/DL
GFR SERPLBLD CREATININE-BSD FMLA CKD-EPI: 98 ML/MIN/1.73 M 2
GLOBULIN SER CALC-MCNC: 3 G/DL (ref 1.9–3.5)
GLUCOSE SERPL-MCNC: 86 MG/DL (ref 65–99)
HBA1C MFR BLD: 5.9 % (ref 4–5.6)
MICROALBUMIN UR-MCNC: <1.2 MG/DL
MICROALBUMIN/CREAT UR: NORMAL MG/G (ref 0–30)
POTASSIUM SERPL-SCNC: 4.6 MMOL/L (ref 3.6–5.5)
PROT SERPL-MCNC: 6.7 G/DL (ref 6–8.2)
SODIUM SERPL-SCNC: 140 MMOL/L (ref 135–145)
T3FREE SERPL-MCNC: 2.41 PG/ML (ref 2–4.4)
T4 FREE SERPL-MCNC: 1.55 NG/DL (ref 0.93–1.7)
THYROPEROXIDASE AB SERPL-ACNC: 11 IU/ML (ref 0–9)
TSH SERPL DL<=0.005 MIU/L-ACNC: 1.7 UIU/ML (ref 0.38–5.33)

## 2024-11-20 PROCEDURE — 83036 HEMOGLOBIN GLYCOSYLATED A1C: CPT | Mod: GA

## 2024-11-20 PROCEDURE — 84443 ASSAY THYROID STIM HORMONE: CPT

## 2024-11-20 PROCEDURE — 82570 ASSAY OF URINE CREATININE: CPT

## 2024-11-20 PROCEDURE — 36415 COLL VENOUS BLD VENIPUNCTURE: CPT | Mod: GA

## 2024-11-20 PROCEDURE — 86376 MICROSOMAL ANTIBODY EACH: CPT

## 2024-11-20 PROCEDURE — 82306 VITAMIN D 25 HYDROXY: CPT

## 2024-11-20 PROCEDURE — 84481 FREE ASSAY (FT-3): CPT

## 2024-11-20 PROCEDURE — 84439 ASSAY OF FREE THYROXINE: CPT

## 2024-11-20 PROCEDURE — 80053 COMPREHEN METABOLIC PANEL: CPT

## 2024-11-20 PROCEDURE — 82043 UR ALBUMIN QUANTITATIVE: CPT

## 2024-11-27 ENCOUNTER — APPOINTMENT (OUTPATIENT)
Dept: MEDICAL GROUP | Facility: MEDICAL CENTER | Age: 69
End: 2024-11-27
Payer: MEDICARE

## 2024-11-27 VITALS
DIASTOLIC BLOOD PRESSURE: 68 MMHG | TEMPERATURE: 96.8 F | WEIGHT: 218 LBS | BODY MASS INDEX: 36.32 KG/M2 | HEART RATE: 48 BPM | OXYGEN SATURATION: 98 % | SYSTOLIC BLOOD PRESSURE: 128 MMHG | HEIGHT: 65 IN

## 2024-11-27 DIAGNOSIS — R73.01 IFG (IMPAIRED FASTING GLUCOSE): ICD-10-CM

## 2024-11-27 DIAGNOSIS — E03.9 ACQUIRED HYPOTHYROIDISM: ICD-10-CM

## 2024-11-27 DIAGNOSIS — E55.9 VITAMIN D DEFICIENCY: ICD-10-CM

## 2024-11-27 DIAGNOSIS — I10 ESSENTIAL HYPERTENSION: ICD-10-CM

## 2024-11-27 PROCEDURE — 3074F SYST BP LT 130 MM HG: CPT | Performed by: NURSE PRACTITIONER

## 2024-11-27 PROCEDURE — 99214 OFFICE O/P EST MOD 30 MIN: CPT | Performed by: NURSE PRACTITIONER

## 2024-11-27 PROCEDURE — 3078F DIAST BP <80 MM HG: CPT | Performed by: NURSE PRACTITIONER

## 2024-11-27 RX ORDER — LISINOPRIL 20 MG/1
20 TABLET ORAL 2 TIMES DAILY
Qty: 180 TABLET | Refills: 0
Start: 2024-11-27

## 2024-11-27 ASSESSMENT — FIBROSIS 4 INDEX: FIB4 SCORE: 1.11930376086695478

## 2024-11-28 NOTE — PROGRESS NOTES
Subjective:     History of Present Illness  The patient is a 69-year-old female seen in follow-up for hypertension.    She has been adhering to her prescribed medication regimen, taking 1.5 tablets of lisinopril twice daily. She has been monitoring her blood pressure at home and has brought her records for review. She reports no adverse effects from the increased dosage of lisinopril, such as dizziness. Her heart rate has been consistently in the 40s and 50s, with today's reading at 48. She is not currently taking any beta blockers. She admits to a fondness for salt, which may be contributing to her hypertension.    She is currently taking a calcium supplement that includes vitamin D.    SOCIAL HISTORY  She does not smoke.      Results  - Laboratory Studies:    - GFR: wnl, 98    - A1c: 5.9 (down from 6.1)  not on med for DM.    - TSH: within normal limits    - T4: within normal limits    - T3: within normal limit    - TPO - mildly elevated at 11.    - Vitamin D: low normal at 33.  Not on        otc supplement.    - CMP: normal    - Albumin/creatinine ratio: normal    Current medicines (including changes today)  Current Outpatient Medications   Medication Sig Dispense Refill    lisinopril (PRINIVIL) 20 MG Tab Take 1 Tablet by mouth 2 times a day. 180 Tablet 0    Magnesium 400 MG Cap Take 400 mg by mouth every day.      rosuvastatin (CRESTOR) 10 MG Tab Take 1 Tablet by mouth every evening. 90 Tablet 2    hydroxychloroquine (PLAQUENIL) 200 MG Tab TAKE 1 TABLET BY MOUTH 2 TIMES A  Tablet 1    ketoconazole (NIZORAL) 2 % Cream Apply 1 Application  topically 2 times a day. 30 g 1    levothyroxine (SYNTHROID) 137 MCG Tab Take 1 Tablet by mouth every morning on an empty stomach. TAKE 1 TABLET BY MOUTH DAILY FOR 6 DAYS PER WEEK AND TAKE 1 AND 1/2 TABLET BY MOUTH DAILY FOR 1 DAY PER WEEK 90 Tablet 3    scopolamine (TRANSDERM SCOP, 1.5 MG,) 1 mg/72hr PATCH 72 HR Place 1 Patch on the skin every 72 hours. 12 Patch 3     albuterol 108 (90 Base) MCG/ACT Aero Soln inhalation aerosol Inhale 2 Puffs every 6 hours as needed for Shortness of Breath. 8.5 g 1    acyclovir (ZOVIRAX) 400 MG tablet Take 1 Tablet by mouth 2 times a day. 180 Tablet 3    dicyclomine (BENTYL) 10 MG Cap       aspirin 81 MG EC tablet Take 81 mg by mouth every day.      B Complex Cap Take 1 Cap by mouth every day. 30 Cap 0    OMEPRAZOLE 20 MG PO CPDR Take 20 mg by mouth as needed (For heartburn).  11     No current facility-administered medications for this visit.     She  has a past medical history of Adenomatous polyp of colon (04/22/2009), Allergic rhinitis, Carpal tunnel syndrome (04/22/2009), Diverticulitis (04/22/2009), GERD (gastroesophageal reflux disease) (04/22/2009), Hyperlipidemia LDL goal <70 (04/22/2009), Hypertension, Hypothyroidism, Impaired fasting glucose (04/22/2009), NSTEMI (non-ST elevated myocardial infarction) ([facility]) (01/17/2023), Obesity (BMI 30-39.9) (07/13/2017), Other microscopic colitis (01/17/2023), Pruritic dermatitis (04/22/2009), Pulmonary nodule (04/22/2009), Rheumatoid arthritis ([facility]), Sjogren's syndrome ([facility]), Syncope, and Vertigo (03/13/2024).    Hemoglobin A1c:  Lab Results   Component Value Date/Time    HBA1C 5.9 (H) 11/20/2024 07:12 AM    HBA1C 6.1 (H) 03/08/2024 06:56 AM    HBA1C 5.8 (H) 09/13/2023 07:02 AM       Microalbumin/creatinine Ratio:  Lab Results   Component Value Date/Time    URCREAT 92.39 11/20/2024 0711    MICROALBUR <1.2 11/20/2024 0711    MALBCRT see below 11/20/2024 0711       Lipid Panel:  Lab Results   Component Value Date/Time    CHOLSTRLTOT 148 05/22/2024 0711    TRIGLYCERIDE 87 05/22/2024 0711    LDL 75 05/22/2024 0711    CHOLHDLRAT 3.7 12/22/2017 0819       Thyroid:  Lab Results   Component Value Date/Time    TSHULTRASEN 1.700 11/20/2024 0712     Lab Results   Component Value Date/Time    FREET4 1.55 11/20/2024 0712     Complete Metabolic Panel:  Lab Results   Component Value Date/Time    SODIUM 140  "11/20/2024 07:12 AM    POTASSIUM 4.6 11/20/2024 07:12 AM    CHLORIDE 103 11/20/2024 07:12 AM    CO2 25 11/20/2024 07:12 AM    ANION 12.0 11/20/2024 07:12 AM    GLUCOSE 86 11/20/2024 07:12 AM    BUN 10 11/20/2024 07:12 AM    CREATININE 0.58 11/20/2024 07:12 AM    CREATININE 0.83 03/29/2011 07:30 AM    ASTSGOT 21 11/20/2024 07:12 AM    ALTSGPT 22 11/20/2024 07:12 AM    TBILIRUBIN 0.7 11/20/2024 07:12 AM    ALBUMIN 3.7 11/20/2024 07:12 AM    ALBUMIN 3.95 07/28/2023 07:07 AM    TOTPROTEIN 6.7 11/20/2024 07:12 AM    TOTPROTEIN 6.8 07/28/2023 07:07 AM    GLOBULIN 3.0 11/20/2024 07:12 AM    AGRATIO 1.2 11/20/2024 07:12 AM         Vitamin D:    Lab Results   Component Value Date/Time    25HYDROXY 33 11/20/2024 0712       GFR:    Lab Results   Component Value Date/Time    GFRCKD 98 11/20/2024 0712           ROS   Review of Systems   Constitutional: Negative.  Negative for fever, chills, weight loss, malaise/fatigue and diaphoresis.   HENT: Negative.  Negative for hearing loss, ear pain, nosebleeds, congestion, sore throat, neck pain, tinnitus and ear discharge.    Respiratory: Negative.  Negative for cough, hemoptysis, sputum production, shortness of breath, wheezing and stridor.    Cardiovascular: Negative.  Negative for chest pain, palpitations, orthopnea, claudication, leg swelling and PND.   Gastrointestinal: denies nausea, vomiting, diarrhea, constipation, heartburn, melena or hematochezia.  Genitourinary: Denies dysuria, hematuria, urinary incontinence, frequency or urgency.    Musculoskeletal: Negative.  Negative for myalgias and back pain.   Neurological: Negative.  Negative for dizziness, tingling, tremors, weakness and headaches.   Psych:  Denies depression, anxiety or insomnia.  All other systems reviewed and are negative.         Objective:     /68 (BP Location: Left arm, Patient Position: Sitting, BP Cuff Size: Large adult)   Pulse (!) 48   Temp 36 °C (96.8 °F) (Temporal)   Ht 1.651 m (5' 5\")   Wt 98.9 " kg (218 lb)   SpO2 98%  Body mass index is 36.28 kg/m².   Physical Exam  Vital Signs  Blood pressure is 160/78.  Physical Exam   Vitals reviewed.  Constitutional: oriented to person, place, and time. appears well-developed and well-nourished. No distress.   Neck: No JVD present.  Cardiovascular: Normal rate, regular rhythm, normal heart sounds and intact distal pulses.  Exam reveals no gallop and no friction rub.  No murmur heard.  No carotid bruits.   Pulmonary/Chest: Effort normal and breath sounds normal. No stridor. No respiratory distress. no wheezes or rales. exhibits no tenderness.   Musculoskeletal: Normal range of motion. exhibits no edema. norma pedal pulses 2+.  Lymphadenopathy: no cervical or supraclavicular adenopathy.   Neurological: alert and oriented to person, place, and time. exhibits normal muscle tone. Coordination normal.   Skin: Skin is warm and dry. no diaphoresis.   Psychiatric: normal mood and affect. behavior is normal.           Assessment and Plan:   The following treatment plan was discussed      Assessment & Plan  1. Hypertension.  Her diastolic readings are generally satisfactory, with only a few elevated instances.  The dosage of lisinopril will be increased to 20 mg twice daily.  A prescription for 180 tablets, sufficient for 3 months, will be provided. She is advised to continue her current lifestyle modifications. Should her blood pressure remain uncontrolled, the addition of hydrochlorothiazide will be considered.    2. Vitamin D Def.  Her vitamin D level is slightly low, so she should increase her intake to 2000 units D3.   Lab work will be repeated in approximately 6 months.     3.  Hypothyroidism  TFT's are wnl except TPO.  If her TPO increases, a neck ultrasound will be considered.    4.  IFG  A1c improved.  Enc pt to continue to improve healthy diet and regular exercise.  [facility]ela lab 6 mo.  F/u for review.    Follow-up  She will return in 6 weeks for a blood pressure  check.      ORDERS:  1. Essential hypertension    - lisinopril (PRINIVIL) 20 MG Tab; Take 1 Tablet by mouth 2 times a day.  Dispense: 180 Tablet; Refill: 0    2. Acquired hypothyroidism      3. IFG (impaired fasting glucose)      Please note that this dictation was created using voice recognition software. I have made every reasonable attempt to correct obvious errors, but I expect that there are errors of grammar and possibly content that I did not discover before finalizing the note.      Attestation      Verbal consent was acquired by the patient to use [employer] ambient listening note generation during this visit Yes

## 2025-01-16 ENCOUNTER — OFFICE VISIT (OUTPATIENT)
Dept: MEDICAL GROUP | Facility: MEDICAL CENTER | Age: 70
End: 2025-01-16
Payer: MEDICARE

## 2025-01-16 VITALS
SYSTOLIC BLOOD PRESSURE: 136 MMHG | HEIGHT: 65 IN | DIASTOLIC BLOOD PRESSURE: 72 MMHG | TEMPERATURE: 97 F | HEART RATE: 72 BPM | WEIGHT: 223 LBS | BODY MASS INDEX: 37.15 KG/M2 | OXYGEN SATURATION: 96 %

## 2025-01-16 DIAGNOSIS — K21.00 GASTROESOPHAGEAL REFLUX DISEASE WITH ESOPHAGITIS WITHOUT HEMORRHAGE: ICD-10-CM

## 2025-01-16 DIAGNOSIS — I10 ESSENTIAL HYPERTENSION: ICD-10-CM

## 2025-01-16 DIAGNOSIS — E03.9 ACQUIRED HYPOTHYROIDISM: ICD-10-CM

## 2025-01-16 PROCEDURE — 3075F SYST BP GE 130 - 139MM HG: CPT | Performed by: NURSE PRACTITIONER

## 2025-01-16 PROCEDURE — 99214 OFFICE O/P EST MOD 30 MIN: CPT | Performed by: NURSE PRACTITIONER

## 2025-01-16 PROCEDURE — 3078F DIAST BP <80 MM HG: CPT | Performed by: NURSE PRACTITIONER

## 2025-01-16 RX ORDER — OMEPRAZOLE 40 MG/1
40 CAPSULE, DELAYED RELEASE ORAL DAILY
Qty: 100 CAPSULE | Refills: 1 | Status: SHIPPED | OUTPATIENT
Start: 2025-01-16

## 2025-01-16 RX ORDER — LISINOPRIL 20 MG/1
20 TABLET ORAL 2 TIMES DAILY
Qty: 200 TABLET | Refills: 1 | Status: SHIPPED | OUTPATIENT
Start: 2025-01-16

## 2025-01-16 RX ORDER — LEVOTHYROXINE SODIUM 137 UG/1
137 TABLET ORAL
Qty: 100 TABLET | Refills: 1 | Status: SHIPPED | OUTPATIENT
Start: 2025-01-16

## 2025-01-16 RX ORDER — AMLODIPINE BESYLATE 2.5 MG/1
2.5 TABLET ORAL DAILY
Qty: 100 TABLET | Refills: 0 | Status: SHIPPED | OUTPATIENT
Start: 2025-01-16 | End: 2026-02-20

## 2025-01-16 ASSESSMENT — FIBROSIS 4 INDEX: FIB4 SCORE: 1.11930376086695478

## 2025-01-16 NOTE — PROGRESS NOTES
Subjective:     History of Present Illness  The patient is a 69-year-old female seen in follow-up for hypertension.    She reports no current edema but acknowledges recent weight gain, which she attributes to poor dietary habits. She has observed that excessive salt intake leads to intermittent swelling, although she generally avoids overconsumption of salt. She expresses concern about potential interactions between amlodipine and her other medications. Additionally, she mentions a persistent cough, which she believes may be a side effect of lisinopril, but she prefers to continue with this medication rather than switch to losartan.  She has been monitoring her BP at home.  It is running 130's/80's.  No s/e to lisinopril 20 mg bid.      She is currently on a daily regimen of levothyroxine 137 mcg.  She needs corrected presc sent to pharmacy.  Last lab in November was wnl.    She takes omeprazole 40 mg daily for gastroesophageal reflux disease (GERD). She has a hiatal hernia, which exacerbates her symptoms when she lies down.  She needs corrected refill sent to pharmacy    MEDICATIONS  Current Outpatient Medications   Medication Sig Dispense Refill    lisinopril (PRINIVIL) 20 MG Tab Take 1 Tablet by mouth 2 times a day. 200 Tablet 1    amLODIPine (NORVASC) 2.5 MG Tab Take 1 Tablet by mouth every day. 100 Tablet 0    levothyroxine (SYNTHROID) 137 MCG Tab Take 1 Tablet by mouth every morning on an empty stomach. TAKE 1 TABLET BY MOUTH DAILY 100 Tablet 1    omeprazole (PRILOSEC) 40 MG delayed-release capsule Take 1 Capsule by mouth every day. 100 Capsule 1    Magnesium 400 MG Cap Take 400 mg by mouth every day.      rosuvastatin (CRESTOR) 10 MG Tab Take 1 Tablet by mouth every evening. 90 Tablet 2    hydroxychloroquine (PLAQUENIL) 200 MG Tab TAKE 1 TABLET BY MOUTH 2 TIMES A  Tablet 1    ketoconazole (NIZORAL) 2 % Cream Apply 1 Application  topically 2 times a day. 30 g 1    scopolamine (TRANSDERM SCOP, 1.5 MG,) 1  mg/72hr PATCH 72 HR Place 1 Patch on the skin every 72 hours. 12 Patch 3    albuterol 108 (90 Base) MCG/ACT Aero Soln inhalation aerosol Inhale 2 Puffs every 6 hours as needed for Shortness of Breath. 8.5 g 1    acyclovir (ZOVIRAX) 400 MG tablet Take 1 Tablet by mouth 2 times a day. 180 Tablet 3    dicyclomine (BENTYL) 10 MG Cap       aspirin 81 MG EC tablet Take 81 mg by mouth every day.      B Complex Cap Take 1 Cap by mouth every day. 30 Cap 0     No current facility-administered medications for this visit.       Results  none    Current medicines (including changes today)  Current Outpatient Medications   Medication Sig Dispense Refill    lisinopril (PRINIVIL) 20 MG Tab Take 1 Tablet by mouth 2 times a day. 200 Tablet 1    amLODIPine (NORVASC) 2.5 MG Tab Take 1 Tablet by mouth every day. 100 Tablet 0    levothyroxine (SYNTHROID) 137 MCG Tab Take 1 Tablet by mouth every morning on an empty stomach. TAKE 1 TABLET BY MOUTH DAILY 100 Tablet 1    omeprazole (PRILOSEC) 40 MG delayed-release capsule Take 1 Capsule by mouth every day. 100 Capsule 1    Magnesium 400 MG Cap Take 400 mg by mouth every day.      rosuvastatin (CRESTOR) 10 MG Tab Take 1 Tablet by mouth every evening. 90 Tablet 2    hydroxychloroquine (PLAQUENIL) 200 MG Tab TAKE 1 TABLET BY MOUTH 2 TIMES A  Tablet 1    ketoconazole (NIZORAL) 2 % Cream Apply 1 Application  topically 2 times a day. 30 g 1    scopolamine (TRANSDERM SCOP, 1.5 MG,) 1 mg/72hr PATCH 72 HR Place 1 Patch on the skin every 72 hours. 12 Patch 3    albuterol 108 (90 Base) MCG/ACT Aero Soln inhalation aerosol Inhale 2 Puffs every 6 hours as needed for Shortness of Breath. 8.5 g 1    acyclovir (ZOVIRAX) 400 MG tablet Take 1 Tablet by mouth 2 times a day. 180 Tablet 3    dicyclomine (BENTYL) 10 MG Cap       aspirin 81 MG EC tablet Take 81 mg by mouth every day.      B Complex Cap Take 1 Cap by mouth every day. 30 Cap 0     No current facility-administered medications for this visit.      Current Outpatient Medications   Medication Sig Dispense Refill    lisinopril (PRINIVIL) 20 MG Tab Take 1 Tablet by mouth 2 times a day. 200 Tablet 1    amLODIPine (NORVASC) 2.5 MG Tab Take 1 Tablet by mouth every day. 100 Tablet 0    levothyroxine (SYNTHROID) 137 MCG Tab Take 1 Tablet by mouth every morning on an empty stomach. TAKE 1 TABLET BY MOUTH DAILY 100 Tablet 1    omeprazole (PRILOSEC) 40 MG delayed-release capsule Take 1 Capsule by mouth every day. 100 Capsule 1    Magnesium 400 MG Cap Take 400 mg by mouth every day.      rosuvastatin (CRESTOR) 10 MG Tab Take 1 Tablet by mouth every evening. 90 Tablet 2    hydroxychloroquine (PLAQUENIL) 200 MG Tab TAKE 1 TABLET BY MOUTH 2 TIMES A  Tablet 1    ketoconazole (NIZORAL) 2 % Cream Apply 1 Application  topically 2 times a day. 30 g 1    scopolamine (TRANSDERM SCOP, 1.5 MG,) 1 mg/72hr PATCH 72 HR Place 1 Patch on the skin every 72 hours. 12 Patch 3    albuterol 108 (90 Base) MCG/ACT Aero Soln inhalation aerosol Inhale 2 Puffs every 6 hours as needed for Shortness of Breath. 8.5 g 1    acyclovir (ZOVIRAX) 400 MG tablet Take 1 Tablet by mouth 2 times a day. 180 Tablet 3    dicyclomine (BENTYL) 10 MG Cap       aspirin 81 MG EC tablet Take 81 mg by mouth every day.      B Complex Cap Take 1 Cap by mouth every day. 30 Cap 0     No current facility-administered medications for this visit.       She  has a past medical history of Adenomatous polyp of colon (04/22/2009), Allergic rhinitis, Carpal tunnel syndrome (04/22/2009), Diverticulitis (04/22/2009), GERD (gastroesophageal reflux disease) (04/22/2009), Hyperlipidemia LDL goal <70 (04/22/2009), Hypertension, Hypothyroidism, Impaired fasting glucose (04/22/2009), NSTEMI (non-ST elevated myocardial infarction) ([facility]) (01/17/2023), Obesity (BMI 30-39.9) (07/13/2017), Other microscopic colitis (01/17/2023), Pruritic dermatitis (04/22/2009), Pulmonary nodule (04/22/2009), Rheumatoid arthritis (HCC), Sjogren's  "syndrome (HCC), Syncope, and Vertigo (03/13/2024).      ROS   Review of Systems   Constitutional: Negative.  Negative for fever, chills, weight loss, malaise/fatigue and diaphoresis.   HENT: Negative.  Negative for hearing loss, ear pain, nosebleeds, congestion, sore throat, neck pain, tinnitus and ear discharge.    Respiratory: Negative.  Negative for cough, hemoptysis, sputum production, shortness of breath, wheezing and stridor.    Cardiovascular: Negative.  Negative for chest pain, palpitations, orthopnea, claudication, PND.   Gastrointestinal: denies nausea, vomiting, diarrhea, constipation, heartburn, melena or hematochezia.  Genitourinary: Denies dysuria, hematuria, urinary incontinence, frequency or urgency.    Musculoskeletal: Negative.  Negative for myalgias and back pain.   Neurological: Negative.  Negative for dizziness, tingling, tremors, weakness and headaches.   Psych:  Denies depression, anxiety or insomnia.  All other systems reviewed and are negative.       Objective:     /72   Pulse 72   Temp 36.1 °C (97 °F) (Temporal)   Ht 1.651 m (5' 5\")   Wt 101 kg (223 lb)   SpO2 96%  Body mass index is 37.11 kg/m².   Physical Exam  Lungs were auscultated.  Heart was examined.  Physical Exam   Vitals reviewed.  Constitutional: oriented to person, place, and time. appears well-developed and well-nourished. No distress.   Neck: No JVD present.  Cardiovascular: Normal rate, regular rhythm, normal heart sounds and intact distal pulses.  Exam reveals no gallop and no friction rub.  No murmur heard.  No carotid bruits.   Pulmonary/Chest: Effort normal and breath sounds normal. No stridor. No respiratory distress. no wheezes or rales. exhibits no tenderness.   Musculoskeletal: Normal range of motion. exhibits no edema. norma pedal pulses 2+.  Lymphadenopathy: no cervical or supraclavicular adenopathy.   Neurological: alert and oriented to person, place, and time. exhibits normal muscle tone. Coordination " normal.   Skin: Skin is warm and dry. no diaphoresis.   Psychiatric: normal mood and affect. behavior is normal.       Assessment and Plan:   The following treatment plan was discussed    Assessment & Plan  1. Hypertension.  A prescription for amlodipine 2.5 mg daily, consisting of 100 tablets, has been issued. Additionally, a refill prescription for lisinopril 20 mg twice daily, comprising 200 tablets, has also been provided. She has cough, but she prefers to continue with it over switching to losartan. If her blood pressure does not improve or if she experiences any adverse effects, adjustments to the medication will be considered.    2. Hypothyroidism.  A prescription for levothyroxine 137 mcg daily, including 100 tablets, has been provided. Stable on med.  [facility] lab 5/25.  Call for lab slip    3. Gastroesophageal reflux disease (GERD).  A prescription for omeprazole 40 mg daily, consisting of 100 tablets, has been issued.  She has to take the 40 mg since she has sx that are worse when she lyes down.  She will have breakthru sx if she misses a day of med.      Follow-up  The patient is scheduled for a follow-up visit in late May 2025.  Call for lab slip      ORDERS:  1. Essential hypertension    - lisinopril (PRINIVIL) 20 MG Tab; Take 1 Tablet by mouth 2 times a day.  Dispense: 200 Tablet; Refill: 1  - amLODIPine (NORVASC) 2.5 MG Tab; Take 1 Tablet by mouth every day.  Dispense: 100 Tablet; Refill: 0    2. Acquired hypothyroidism    - levothyroxine (SYNTHROID) 137 MCG Tab; Take 1 Tablet by mouth every morning on an empty stomach. TAKE 1 TABLET BY MOUTH DAILY  Dispense: 100 Tablet; Refill: 1    3. Gastroesophageal reflux disease with esophagitis without hemorrhage    - omeprazole (PRILOSEC) 40 MG delayed-release capsule; Take 1 Capsule by mouth every day.  Dispense: 100 Capsule; Refill: 1        Please note that this dictation was created using voice recognition software. I have made every reasonable attempt to  correct obvious errors, but I expect that there are errors of grammar and possibly content that I did not discover before finalizing the note.      Attestation      Verbal consent was acquired by the patient to use [employer] ambient listening note generation during this visit Yes

## 2025-03-19 ENCOUNTER — HOSPITAL ENCOUNTER (OUTPATIENT)
Facility: MEDICAL CENTER | Age: 70
End: 2025-03-19
Attending: INTERNAL MEDICINE
Payer: MEDICARE

## 2025-03-19 DIAGNOSIS — M05.79 RHEUMATOID ARTHRITIS INVOLVING MULTIPLE SITES WITH POSITIVE RHEUMATOID FACTOR (HCC): ICD-10-CM

## 2025-03-19 DIAGNOSIS — M35.00 SJOGREN'S SYNDROME, WITH UNSPECIFIED ORGAN INVOLVEMENT (HCC): ICD-10-CM

## 2025-03-19 DIAGNOSIS — Z79.899 LONG-TERM USE OF PLAQUENIL: ICD-10-CM

## 2025-03-19 LAB
ALBUMIN SERPL BCP-MCNC: 4 G/DL (ref 3.2–4.9)
ALBUMIN/GLOB SERPL: 1.5 G/DL
ALP SERPL-CCNC: 73 U/L (ref 30–99)
ALT SERPL-CCNC: 25 U/L (ref 2–50)
ANION GAP SERPL CALC-SCNC: 11 MMOL/L (ref 7–16)
AST SERPL-CCNC: 24 U/L (ref 12–45)
BASOPHILS # BLD AUTO: 0.8 % (ref 0–1.8)
BASOPHILS # BLD: 0.05 K/UL (ref 0–0.12)
BILIRUB SERPL-MCNC: 0.8 MG/DL (ref 0.1–1.5)
BUN SERPL-MCNC: 10 MG/DL (ref 8–22)
CALCIUM ALBUM COR SERPL-MCNC: 9.4 MG/DL (ref 8.5–10.5)
CALCIUM SERPL-MCNC: 9.4 MG/DL (ref 8.4–10.2)
CHLORIDE SERPL-SCNC: 105 MMOL/L (ref 96–112)
CO2 SERPL-SCNC: 23 MMOL/L (ref 20–33)
CREAT SERPL-MCNC: 0.62 MG/DL (ref 0.5–1.4)
CRP SERPL HS-MCNC: 0.66 MG/DL (ref 0–0.75)
EOSINOPHIL # BLD AUTO: 0.2 K/UL (ref 0–0.51)
EOSINOPHIL NFR BLD: 3 % (ref 0–6.9)
ERYTHROCYTE [DISTWIDTH] IN BLOOD BY AUTOMATED COUNT: 46.6 FL (ref 35.9–50)
ERYTHROCYTE [SEDIMENTATION RATE] IN BLOOD BY WESTERGREN METHOD: 0 MM/HOUR (ref 0–25)
GFR SERPLBLD CREATININE-BSD FMLA CKD-EPI: 96 ML/MIN/1.73 M 2
GLOBULIN SER CALC-MCNC: 2.7 G/DL (ref 1.9–3.5)
GLUCOSE SERPL-MCNC: 94 MG/DL (ref 65–99)
HCT VFR BLD AUTO: 45.8 % (ref 37–47)
HGB BLD-MCNC: 15.2 G/DL (ref 12–16)
IMM GRANULOCYTES # BLD AUTO: 0.03 K/UL (ref 0–0.11)
IMM GRANULOCYTES NFR BLD AUTO: 0.5 % (ref 0–0.9)
LYMPHOCYTES # BLD AUTO: 1.55 K/UL (ref 1–4.8)
LYMPHOCYTES NFR BLD: 23.4 % (ref 22–41)
MCH RBC QN AUTO: 30.5 PG (ref 27–33)
MCHC RBC AUTO-ENTMCNC: 33.2 G/DL (ref 32.2–35.5)
MCV RBC AUTO: 92 FL (ref 81.4–97.8)
MONOCYTES # BLD AUTO: 0.65 K/UL (ref 0–0.85)
MONOCYTES NFR BLD AUTO: 9.8 % (ref 0–13.4)
NEUTROPHILS # BLD AUTO: 4.13 K/UL (ref 1.82–7.42)
NEUTROPHILS NFR BLD: 62.5 % (ref 44–72)
NRBC # BLD AUTO: 0 K/UL
NRBC BLD-RTO: 0 /100 WBC (ref 0–0.2)
PLATELET # BLD AUTO: 250 K/UL (ref 164–446)
PMV BLD AUTO: 9.6 FL (ref 9–12.9)
POTASSIUM SERPL-SCNC: 4 MMOL/L (ref 3.6–5.5)
PROT SERPL-MCNC: 6.7 G/DL (ref 6–8.2)
RBC # BLD AUTO: 4.98 M/UL (ref 4.2–5.4)
SODIUM SERPL-SCNC: 139 MMOL/L (ref 135–145)
WBC # BLD AUTO: 6.6 K/UL (ref 4.8–10.8)

## 2025-03-19 PROCEDURE — 80053 COMPREHEN METABOLIC PANEL: CPT

## 2025-03-19 PROCEDURE — 86140 C-REACTIVE PROTEIN: CPT

## 2025-03-19 PROCEDURE — 84165 PROTEIN E-PHORESIS SERUM: CPT

## 2025-03-19 PROCEDURE — 82784 ASSAY IGA/IGD/IGG/IGM EACH: CPT

## 2025-03-19 PROCEDURE — 36415 COLL VENOUS BLD VENIPUNCTURE: CPT

## 2025-03-19 PROCEDURE — 85025 COMPLETE CBC W/AUTO DIFF WBC: CPT

## 2025-03-19 PROCEDURE — 84155 ASSAY OF PROTEIN SERUM: CPT

## 2025-03-19 PROCEDURE — 85652 RBC SED RATE AUTOMATED: CPT

## 2025-03-20 LAB
IGA SERPL-MCNC: 256 MG/DL (ref 68–408)
IGG SERPL-MCNC: 756 MG/DL (ref 768–1632)
IGM SERPL-MCNC: 87 MG/DL (ref 35–263)

## 2025-03-22 LAB
ALBUMIN SERPL ELPH-MCNC: 3.97 G/DL (ref 3.75–5.01)
ALPHA1 GLOB SERPL ELPH-MCNC: 0.27 G/DL (ref 0.19–0.46)
ALPHA2 GLOB SERPL ELPH-MCNC: 0.74 G/DL (ref 0.48–1.05)
B-GLOBULIN SERPL ELPH-MCNC: 0.88 G/DL (ref 0.48–1.1)
GAMMA GLOB SERPL ELPH-MCNC: 0.83 G/DL (ref 0.62–1.51)
INTERPRETATION SERPL IFE-IMP: NORMAL
MONOCLON BAND OBS SERPL: NORMAL
MONOCLONAL PROTEIN NL11656: NORMAL G/DL
PATHOLOGY STUDY: NORMAL
PROT SERPL-MCNC: 6.7 G/DL (ref 6.3–8.2)

## 2025-03-31 ENCOUNTER — APPOINTMENT (OUTPATIENT)
Dept: MEDICAL GROUP | Facility: MEDICAL CENTER | Age: 70
End: 2025-03-31
Payer: MEDICARE

## 2025-04-15 DIAGNOSIS — E78.5 HYPERLIPIDEMIA LDL GOAL <70: ICD-10-CM

## 2025-04-15 DIAGNOSIS — E03.9 ACQUIRED HYPOTHYROIDISM: ICD-10-CM

## 2025-04-15 DIAGNOSIS — R79.89 ELEVATED LFTS: ICD-10-CM

## 2025-04-15 DIAGNOSIS — R73.01 IFG (IMPAIRED FASTING GLUCOSE): ICD-10-CM

## 2025-04-15 DIAGNOSIS — R94.6 ABNORMAL THYROID FUNCTION TEST: ICD-10-CM

## 2025-04-15 DIAGNOSIS — E55.9 VITAMIN D DEFICIENCY: ICD-10-CM

## 2025-04-15 DIAGNOSIS — I10 ESSENTIAL HYPERTENSION: ICD-10-CM

## 2025-04-16 DIAGNOSIS — I10 ESSENTIAL HYPERTENSION: ICD-10-CM

## 2025-04-19 RX ORDER — AMLODIPINE BESYLATE 2.5 MG/1
2.5 TABLET ORAL DAILY
Qty: 100 TABLET | Refills: 0 | Status: SHIPPED | OUTPATIENT
Start: 2025-04-19 | End: 2026-05-24

## 2025-04-19 RX ORDER — AMLODIPINE BESYLATE 2.5 MG/1
2.5 TABLET ORAL DAILY
Qty: 90 TABLET | Refills: 2 | Status: SHIPPED | OUTPATIENT
Start: 2025-04-19

## 2025-04-21 DIAGNOSIS — B02.8 HERPES ZOSTER WITH COMPLICATION: ICD-10-CM

## 2025-04-24 RX ORDER — ACYCLOVIR 400 MG/1
400 TABLET ORAL 2 TIMES DAILY
Qty: 60 TABLET | Refills: 0 | Status: SHIPPED | OUTPATIENT
Start: 2025-04-24

## 2025-05-16 ENCOUNTER — HOSPITAL ENCOUNTER (OUTPATIENT)
Facility: MEDICAL CENTER | Age: 70
End: 2025-05-16
Attending: NURSE PRACTITIONER
Payer: MEDICARE

## 2025-05-16 DIAGNOSIS — E03.9 ACQUIRED HYPOTHYROIDISM: ICD-10-CM

## 2025-05-16 DIAGNOSIS — R94.6 ABNORMAL THYROID FUNCTION TEST: ICD-10-CM

## 2025-05-16 DIAGNOSIS — R79.89 ELEVATED LFTS: ICD-10-CM

## 2025-05-16 DIAGNOSIS — E78.5 HYPERLIPIDEMIA LDL GOAL <70: ICD-10-CM

## 2025-05-16 DIAGNOSIS — I10 ESSENTIAL HYPERTENSION: ICD-10-CM

## 2025-05-16 DIAGNOSIS — R73.01 IFG (IMPAIRED FASTING GLUCOSE): ICD-10-CM

## 2025-05-16 DIAGNOSIS — E55.9 VITAMIN D DEFICIENCY: ICD-10-CM

## 2025-05-16 LAB
25(OH)D3 SERPL-MCNC: 39 NG/ML (ref 30–100)
ALBUMIN SERPL BCP-MCNC: 4 G/DL (ref 3.2–4.9)
ALBUMIN/GLOB SERPL: 1.7 G/DL
ALP SERPL-CCNC: 67 U/L (ref 30–99)
ALT SERPL-CCNC: 22 U/L (ref 2–50)
ANION GAP SERPL CALC-SCNC: 13 MMOL/L (ref 7–16)
AST SERPL-CCNC: 23 U/L (ref 12–45)
BILIRUB SERPL-MCNC: 0.7 MG/DL (ref 0.1–1.5)
BUN SERPL-MCNC: 11 MG/DL (ref 8–22)
CALCIUM ALBUM COR SERPL-MCNC: 9.3 MG/DL (ref 8.5–10.5)
CALCIUM SERPL-MCNC: 9.3 MG/DL (ref 8.5–10.5)
CHLORIDE SERPL-SCNC: 106 MMOL/L (ref 96–112)
CHOLEST SERPL-MCNC: 143 MG/DL (ref 100–199)
CO2 SERPL-SCNC: 21 MMOL/L (ref 20–33)
CREAT SERPL-MCNC: 0.69 MG/DL (ref 0.5–1.4)
EST. AVERAGE GLUCOSE BLD GHB EST-MCNC: 126 MG/DL
FASTING STATUS PATIENT QL REPORTED: NORMAL
GFR SERPLBLD CREATININE-BSD FMLA CKD-EPI: 94 ML/MIN/1.73 M 2
GLOBULIN SER CALC-MCNC: 2.4 G/DL (ref 1.9–3.5)
GLUCOSE SERPL-MCNC: 82 MG/DL (ref 65–99)
HBA1C MFR BLD: 6 % (ref 4–5.6)
HDLC SERPL-MCNC: 57 MG/DL
LDLC SERPL CALC-MCNC: 68 MG/DL
POTASSIUM SERPL-SCNC: 4.1 MMOL/L (ref 3.6–5.5)
PROT SERPL-MCNC: 6.4 G/DL (ref 6–8.2)
SODIUM SERPL-SCNC: 140 MMOL/L (ref 135–145)
T3FREE SERPL-MCNC: 2.6 PG/ML (ref 2–4.4)
T4 FREE SERPL-MCNC: 1.68 NG/DL (ref 0.93–1.7)
TRIGL SERPL-MCNC: 88 MG/DL (ref 0–149)
TSH SERPL DL<=0.005 MIU/L-ACNC: 1.43 UIU/ML (ref 0.38–5.33)

## 2025-05-16 PROCEDURE — 82306 VITAMIN D 25 HYDROXY: CPT

## 2025-05-16 PROCEDURE — 84439 ASSAY OF FREE THYROXINE: CPT

## 2025-05-16 PROCEDURE — 80053 COMPREHEN METABOLIC PANEL: CPT

## 2025-05-16 PROCEDURE — 84481 FREE ASSAY (FT-3): CPT

## 2025-05-16 PROCEDURE — 86376 MICROSOMAL ANTIBODY EACH: CPT

## 2025-05-16 PROCEDURE — 80061 LIPID PANEL: CPT

## 2025-05-16 PROCEDURE — 84443 ASSAY THYROID STIM HORMONE: CPT

## 2025-05-16 PROCEDURE — 83036 HEMOGLOBIN GLYCOSYLATED A1C: CPT | Mod: GA

## 2025-05-16 PROCEDURE — 36415 COLL VENOUS BLD VENIPUNCTURE: CPT

## 2025-05-18 SDOH — ECONOMIC STABILITY: FOOD INSECURITY: WITHIN THE PAST 12 MONTHS, YOU WORRIED THAT YOUR FOOD WOULD RUN OUT BEFORE YOU GOT MONEY TO BUY MORE.: NEVER TRUE

## 2025-05-18 SDOH — ECONOMIC STABILITY: FOOD INSECURITY: WITHIN THE PAST 12 MONTHS, THE FOOD YOU BOUGHT JUST DIDN'T LAST AND YOU DIDN'T HAVE MONEY TO GET MORE.: NEVER TRUE

## 2025-05-18 SDOH — HEALTH STABILITY: PHYSICAL HEALTH: ON AVERAGE, HOW MANY MINUTES DO YOU ENGAGE IN EXERCISE AT THIS LEVEL?: 30 MIN

## 2025-05-18 SDOH — ECONOMIC STABILITY: INCOME INSECURITY: HOW HARD IS IT FOR YOU TO PAY FOR THE VERY BASICS LIKE FOOD, HOUSING, MEDICAL CARE, AND HEATING?: NOT HARD AT ALL

## 2025-05-18 SDOH — ECONOMIC STABILITY: INCOME INSECURITY: IN THE LAST 12 MONTHS, WAS THERE A TIME WHEN YOU WERE NOT ABLE TO PAY THE MORTGAGE OR RENT ON TIME?: NO

## 2025-05-18 SDOH — HEALTH STABILITY: PHYSICAL HEALTH: ON AVERAGE, HOW MANY DAYS PER WEEK DO YOU ENGAGE IN MODERATE TO STRENUOUS EXERCISE (LIKE A BRISK WALK)?: 4 DAYS

## 2025-05-18 ASSESSMENT — LIFESTYLE VARIABLES
AUDIT-C TOTAL SCORE: 4
HOW MANY STANDARD DRINKS CONTAINING ALCOHOL DO YOU HAVE ON A TYPICAL DAY: 1 OR 2
SKIP TO QUESTIONS 9-10: 1
HOW OFTEN DO YOU HAVE SIX OR MORE DRINKS ON ONE OCCASION: NEVER
HOW OFTEN DO YOU HAVE A DRINK CONTAINING ALCOHOL: 4 OR MORE TIMES A WEEK

## 2025-05-18 ASSESSMENT — SOCIAL DETERMINANTS OF HEALTH (SDOH)
HOW OFTEN DO YOU HAVE A DRINK CONTAINING ALCOHOL: 4 OR MORE TIMES A WEEK
WITHIN THE PAST 12 MONTHS, YOU WORRIED THAT YOUR FOOD WOULD RUN OUT BEFORE YOU GOT THE MONEY TO BUY MORE: NEVER TRUE
IN THE PAST 12 MONTHS, HAS THE ELECTRIC, GAS, OIL, OR WATER COMPANY THREATENED TO SHUT OFF SERVICE IN YOUR HOME?: NO
HOW HARD IS IT FOR YOU TO PAY FOR THE VERY BASICS LIKE FOOD, HOUSING, MEDICAL CARE, AND HEATING?: NOT HARD AT ALL
DO YOU BELONG TO ANY CLUBS OR ORGANIZATIONS SUCH AS CHURCH GROUPS UNIONS, FRATERNAL OR ATHLETIC GROUPS, OR SCHOOL GROUPS?: NO
IN A TYPICAL WEEK, HOW MANY TIMES DO YOU TALK ON THE PHONE WITH FAMILY, FRIENDS, OR NEIGHBORS?: MORE THAN THREE TIMES A WEEK
IN A TYPICAL WEEK, HOW MANY TIMES DO YOU TALK ON THE PHONE WITH FAMILY, FRIENDS, OR NEIGHBORS?: MORE THAN THREE TIMES A WEEK
HOW OFTEN DO YOU GET TOGETHER WITH FRIENDS OR RELATIVES?: THREE TIMES A WEEK
HOW MANY DRINKS CONTAINING ALCOHOL DO YOU HAVE ON A TYPICAL DAY WHEN YOU ARE DRINKING: 1 OR 2
HOW OFTEN DO YOU HAVE SIX OR MORE DRINKS ON ONE OCCASION: NEVER
HOW OFTEN DO YOU ATTENT MEETINGS OF THE CLUB OR ORGANIZATION YOU BELONG TO?: NEVER
DO YOU BELONG TO ANY CLUBS OR ORGANIZATIONS SUCH AS CHURCH GROUPS UNIONS, FRATERNAL OR ATHLETIC GROUPS, OR SCHOOL GROUPS?: NO
HOW OFTEN DO YOU ATTEND CHURCH OR RELIGIOUS SERVICES?: NEVER
HOW OFTEN DO YOU GET TOGETHER WITH FRIENDS OR RELATIVES?: THREE TIMES A WEEK
HOW OFTEN DO YOU ATTENT MEETINGS OF THE CLUB OR ORGANIZATION YOU BELONG TO?: NEVER
HOW OFTEN DO YOU ATTEND CHURCH OR RELIGIOUS SERVICES?: NEVER

## 2025-05-21 ENCOUNTER — APPOINTMENT (OUTPATIENT)
Dept: MEDICAL GROUP | Facility: MEDICAL CENTER | Age: 70
End: 2025-05-21
Payer: MEDICARE

## 2025-05-21 VITALS
TEMPERATURE: 98 F | WEIGHT: 222.44 LBS | HEART RATE: 53 BPM | OXYGEN SATURATION: 98 % | SYSTOLIC BLOOD PRESSURE: 128 MMHG | HEIGHT: 63 IN | DIASTOLIC BLOOD PRESSURE: 68 MMHG | BODY MASS INDEX: 39.41 KG/M2

## 2025-05-21 DIAGNOSIS — R42 VERTIGO: ICD-10-CM

## 2025-05-21 DIAGNOSIS — M35.05 SJOGREN SYNDROME WITH INFLAMMATORY ARTHRITIS (HCC): ICD-10-CM

## 2025-05-21 DIAGNOSIS — B02.8 HERPES ZOSTER WITH COMPLICATION: ICD-10-CM

## 2025-05-21 DIAGNOSIS — I25.10 CAD IN NATIVE ARTERY: ICD-10-CM

## 2025-05-21 DIAGNOSIS — M05.79 RHEUMATOID ARTHRITIS INVOLVING MULTIPLE SITES WITH POSITIVE RHEUMATOID FACTOR (HCC): ICD-10-CM

## 2025-05-21 DIAGNOSIS — R91.1 PULMONARY NODULE: Chronic | ICD-10-CM

## 2025-05-21 DIAGNOSIS — I10 PRIMARY HYPERTENSION: Chronic | ICD-10-CM

## 2025-05-21 DIAGNOSIS — E78.5 HYPERLIPIDEMIA LDL GOAL <70: ICD-10-CM

## 2025-05-21 DIAGNOSIS — K44.9 DIAPHRAGMATIC HERNIA WITHOUT OBSTRUCTION OR GANGRENE: ICD-10-CM

## 2025-05-21 DIAGNOSIS — D12.6 ADENOMATOUS POLYP OF COLON, UNSPECIFIED PART OF COLON: Chronic | ICD-10-CM

## 2025-05-21 DIAGNOSIS — R53.82 CHRONIC FATIGUE: Primary | ICD-10-CM

## 2025-05-21 DIAGNOSIS — I25.2 H/O NON-ST ELEVATION MYOCARDIAL INFARCTION (NSTEMI): ICD-10-CM

## 2025-05-21 DIAGNOSIS — Z12.31 ENCOUNTER FOR SCREENING MAMMOGRAM FOR MALIGNANT NEOPLASM OF BREAST: ICD-10-CM

## 2025-05-21 DIAGNOSIS — R55 SYNCOPE, UNSPECIFIED SYNCOPE TYPE: Chronic | ICD-10-CM

## 2025-05-21 DIAGNOSIS — L30.8 PRURITIC DERMATITIS: Chronic | ICD-10-CM

## 2025-05-21 DIAGNOSIS — Z87.19 HISTORY OF DIVERTICULITIS OF COLON: Chronic | ICD-10-CM

## 2025-05-21 DIAGNOSIS — K64.9 HEMORRHOIDS, UNSPECIFIED HEMORRHOID TYPE: ICD-10-CM

## 2025-05-21 DIAGNOSIS — K21.00 GASTROESOPHAGEAL REFLUX DISEASE WITH ESOPHAGITIS WITHOUT HEMORRHAGE: Chronic | ICD-10-CM

## 2025-05-21 PROBLEM — K57.33 DIVERTICULITIS LARGE INTESTINE W/O PERFORATION OR ABSCESS W/BLEEDING: Status: ACTIVE | Noted: 2023-06-20

## 2025-05-21 LAB — TEST NAME 95000: NORMAL

## 2025-05-21 RX ORDER — ACYCLOVIR 400 MG/1
400 TABLET ORAL 2 TIMES DAILY
Qty: 200 TABLET | Refills: 3 | Status: SHIPPED | OUTPATIENT
Start: 2025-05-21

## 2025-05-21 RX ORDER — ROSUVASTATIN CALCIUM 10 MG/1
10 TABLET, COATED ORAL EVERY EVENING
Qty: 100 TABLET | Refills: 3 | Status: SHIPPED | OUTPATIENT
Start: 2025-05-21

## 2025-05-21 ASSESSMENT — ENCOUNTER SYMPTOMS: GENERAL WELL-BEING: GOOD

## 2025-05-21 ASSESSMENT — ACTIVITIES OF DAILY LIVING (ADL): BATHING_REQUIRES_ASSISTANCE: 0

## 2025-05-21 ASSESSMENT — PATIENT HEALTH QUESTIONNAIRE - PHQ9: CLINICAL INTERPRETATION OF PHQ2 SCORE: 0

## 2025-05-21 ASSESSMENT — FIBROSIS 4 INDEX: FIB4 SCORE: 1.33

## 2025-05-21 NOTE — ASSESSMENT & PLAN NOTE
She is followed by cardiac.  She last had an echo in 2022.  She is sched to have repeat in july.  Having no chest pain. Bp stable and well controlled on norvasc and lisinopril.

## 2025-05-21 NOTE — ASSESSMENT & PLAN NOTE
She has occas dizziness when she gets up in am.  Occurs only occas.  Tries to remain well hydrated

## 2025-05-21 NOTE — ASSESSMENT & PLAN NOTE
Stable on omeprazole dialy.  Will only get sx if she eats REALLY spicy food.  No black tarry stool

## 2025-05-21 NOTE — ASSESSMENT & PLAN NOTE
She only has one occluded artery.  No stenting or PTCA. S he is just on asa daily. Followed by cardiac

## 2025-05-21 NOTE — PROGRESS NOTES
Chief Complaint   Patient presents with    Annual Wellness Visit       HPI:  Elizabeth Nuñez is a 69 y.o. here for Medicare Annual Wellness Visit    She has been having more fatigue.  She saw cardiac and they were concerned about poss thyroid issues. That has been occurring for several months.  She will get SOB with climbing stairs.  She has a repeat echo in 7/25 for eval.  No chest pain or SOB.      Reviewed lab with pt.    FE/TIBC wnl except %sat mildly low at 14. No sx of bleeding  B12, folate, ferritin, GFR, LP, TSH, T4, vitamin D, CMP, T3, TPO is wnl.    A1C UP from 5.9 to 6.0.  she is not on meds for DM.  She has been working on improving her health diet and regular exercise.    She is stable and well controlled onlevothyroxine and crestor for her chol and hypothyroidism.  Taking meds approp.  No s/e to meds. LDL goal i <70 d/t hx of MI.     Syncope  No recent events or chg in tx needed    Vertigo  She has occas dizziness when she gets up in am.  Occurs only occas.  Tries to remain well hydrated    Sjogren's syndrome (HCC)  Stable and well controlled on plaquenil. Followed by endocrinology    Rheumatoid arthritis (HCC)  She is on tx but still has pain in multiple joints and back.  Followed by [facility]    Pulmonary nodule  She has been monitored from 2021 to 2024 with stable nodules.  No current concerns    Pruritic dermatitis  No current sx or tx needed    Hypertension  She is followed by cardiac.  She last had an echo in 2022.  She is sched to have repeat in july.  Having no chest pain. Bp stable and well controlled on norvasc and lisinopril.      Hyperlipidemia LDL goal <70  Stable and well controlled on crestor.  Taking med approp.  Needs RF.     History of diverticulitis of colon  She has occas sx of that.  If she gets spasms she will tx w/bentyl.  Working well.  Also doing dialy fiber    Hemorrhoids  No current sx or tx needed    H/O non-ST elevation myocardial infarction (NSTEMI)  This occurred in  2022.  She is followed by cardiac.  Will repeat echo soon.  No checst pain    GERD (gastroesophageal reflux disease)  Stable on omeprazole dialy.  Will only get sx if she eats REALLY spicy food.  No black tarry stool    Diaphragmatic hernia without obstruction or gangrene  She has sx if she eats late in day and lyes down.  Then she will have problems with the hernia.  She is now eating early in day to avoid sx.     CAD in native artery  She only has one occluded artery.  No stenting or PTCA. S he is just on asa daily. Followed by cardiac    Adenomatous polyp of colon  No current sx.  She is followed by Mina      Patient Active Problem List    Diagnosis Date Noted    Mild mitral regurgitation 10/09/2024    CAD in native artery 03/20/2024    H/O non-ST elevation myocardial infarction (NSTEMI) 03/20/2024    Vertigo 03/13/2024    Hemorrhoids 06/20/2023    Diaphragmatic hernia without obstruction or gangrene 04/26/2023    Sjogren's syndrome (HCC)     Rheumatoid arthritis (HCC)     IFG (impaired fasting glucose) 01/05/2019    Obesity (BMI 30-39.9) 07/13/2017    Allergy to pollen     Hypertension 02/22/2012    Syncope 05/20/2010    Hypothyroid 04/22/2009    Pulmonary nodule 04/22/2009    Hyperlipidemia LDL goal <70 04/22/2009    Pruritic dermatitis 04/22/2009    History of diverticulitis of colon 04/22/2009    Carpal tunnel syndrome 04/22/2009    Adenomatous polyp of colon 04/22/2009    GERD (gastroesophageal reflux disease) 04/22/2009       Current Medications[1]       Current supplements as per medication list.     Allergies: Misc. sulfonamide containing compounds, Morphine, Sulfa drugs, Quinine, Shellfish allergy, and Shellfish-derived products    Current social contact/activities:   Plays pickle ball  Work in garden     She  reports that she has never smoked. She has never used smokeless tobacco. She reports current alcohol use. She reports that she does not use drugs.  Counseling given: Not Answered      ROS:     Gait: Uses no assistive device  Ostomy: No  Other tubes: No  Amputations: No  Chronic oxygen use: No  Last eye exam: 2024  Wears hearing aids: No   : Reports urinary leakage during the last 6 months that has not interfered at all with their daily activities or sleep.  Review of Systems   Constitutional: Negative.  Negative for fever, chills, weight loss, malaise/fatigue and diaphoresis.   HENT: Negative.  Negative for hearing loss, ear pain, nosebleeds, congestion, sore throat, neck pain, tinnitus and ear discharge.    Eyes: Negative.  Negative for blurred vision, double vision, photophobia, pain, discharge and redness.   Respiratory: Negative.  Negative for cough, hemoptysis, sputum production, shortness of breath, wheezing and stridor.    Cardiovascular: Negative.  Negative for chest pain, palpitations, orthopnea, claudication, leg swelling and PND.   Gastrointestinal: Negative.  Negative for nausea, vomiting, abdominal pain, diarrhea, constipation, blood in stool and melena.   Genitourinary: Negative.  Negative for dysuria, urgency, frequency, incontinence, hematuria and flank pain.   Musculoskeletal: Negative.  Negative for myalgias, back pain, falls.   Skin: Negative.  Negative for itching and rash. Followed by derm yearly  Neurological: Negative.  Negative for dizziness, tingling, tremors, sensory change, speech change, focal weakness, seizures, loss of consciousness, weakness and headaches.   Endo/Heme/Allergies: Negative.  Negative for environmental allergies and polydipsia. Does not bruise/bleed easily.   Psychiatric/Behavioral: Negative.  Negative for depression, suicidal ideas, hallucinations, memory loss and substance abuse. The patient is not nervous/anxious and does not have insomnia.    All other systems reviewed and are negative.        Screening:  mammo  Depression Screening  Little interest or pleasure in doing things?  0 - not at all  Feeling down, depressed , or hopeless? 0 - not at  all  Patient Health Questionnaire Score: 0     If depressive symptoms identified deferred to follow up visit unless specifically addressed in assessment and plan.    Interpretation of PHQ-9 Total Score   Score Severity   1-4 No Depression   5-9 Mild Depression   10-14 Moderate Depression   15-19 Moderately Severe Depression   20-27 Severe Depression    Screening for Cognitive Impairment  Do you or any of your friends or family members have any concern about your memory? No  Three Minute Recall (Village, Kitchen, Baby) 3/3    [place] clock face with all 12 numbers and set the hands to show 10 minutes past 11.  Yes    Cognitive concerns identified deferred for follow up unless specifically addressed in assessment and plan.    Fall Risk Assessment  Has the patient had two or more falls in the last year or any fall with injury in the last year?  No    Safety Assessment  Do you always wear your seatbelt?  Yes  Any changes to home needed to function safely? No  Difficulty hearing.  Yes  Patient counseled about all safety risks that were identified.    Functional Assessment ADLs  Are there any barriers preventing you from cooking for yourself or meeting nutritional needs?  No.    Are there any barriers preventing you from driving safely or obtaining transportation?  No.    Are there any barriers preventing you from using a telephone or calling for help?  No    Are there any barriers preventing you from shopping?  No.    Are there any barriers preventing you from taking care of your own finances?  No    Are there any barriers preventing you from managing your medications?  No    Are there any barriers preventing you from showering, bathing or dressing yourself? No    Are there any barriers preventing you from doing housework or laundry? No  Are there any barriers preventing you from using the toilet?No  Are you currently engaging in any exercise or physical activity?  Yes.      Self-Assessment of Health  What is your  perception of your health? Good    Do you sleep more than six hours a night? Yes    In the past 7 days, how much did pain keep you from doing your normal work? Some    Do you spend quality time with family or friends (virtually or in person)? Yes    Do you usually eat a heart healthy diet that constists of a variety of fruits, vegetables, whole grains and fiber? Yes    Do you eat foods high in fat and/or Fast Food more than three times per week? No    How concerned are you that your medical conditions are not being well managed? Not at all    Are you worried that in the next 2 months, you may not have stable housing that you own, rent, or stay in as part of a household? No      Advance Care Planning  Do you have an Advance Directive, Living Will, Durable Power of , or POLST? No                 Health Maintenance Summary            Current Care Gaps       COVID-19 Vaccine (5 - 2024-25 season) Overdue since 9/1/2024 09/25/2023  Imm Admin: Comirnaty (Covid-19 Vaccine, Mrna, 6512-0795 Formula)    11/19/2021  Imm Admin: MODERNA SARS-COV-2 VACCINE (12+)    01/27/2021  Imm Admin: MODERNA SARS-COV-2 VACCINE (12+)    12/30/2020  Imm Admin: MODERNA SARS-COV-2 VACCINE (12+)                      Needs Review       Colorectal Cancer Screening (Colonoscopy - Every 10 Years) Tentatively due on 4/26/2033 04/26/2023  AMB EXTERNAL COLONOSCOPY RESULTS    03/15/2017  Colonoscopy (Done)    02/28/2012  Colonoscopy (Done)                      Awaiting Completion       Mammogram (Yearly) Scheduled for 7/16/2025 05/21/2025  Order placed for MA-SCREENING MAMMO BILAT W/TOMOSYNTHESIS W/CAD by BASILIO Carrera    07/15/2024  MA-SCREENING MAMMO BILAT W/TOMOSYNTHESIS W/CAD    06/21/2023  MA-SCREENING MAMMO BILAT W/TOMOSYNTHESIS W/CAD    06/17/2022  MA-SCREENING MAMMO BILAT W/TOMOSYNTHESIS W/CAD    06/16/2021  MA-SCREENING MAMMO BILAT W/TOMOSYNTHESIS W/CAD     Only the first 5 history entries have been loaded, but more  history exists.                    Upcoming       Annual Wellness Visit (Yearly) Next due on 5/21/2026 05/21/2025  Done    03/13/2024  Done    01/17/2023  Done    09/09/2021  Level of Service: SC PREVENTIVE VISIT,EST,65 & OVER              Bone Density Scan (Every 2 Years) Next due on 7/15/2026      07/15/2024  DS-BONE DENSITY STUDY (DEXA)    07/13/2022  DS-BONE DENSITY STUDY (DEXA)    07/10/2020  DS-BONE DENSITY STUDY (DEXA)    11/01/2013  DS-BONE DENSITY STUDY (DEXA)    10/20/2011  DS-BONE DENSITY STUDY (DEXA)      Only the first 5 history entries have been loaded, but more history exists.              IMM DTaP/Tdap/Td Vaccine (3 - Td or Tdap) Next due on 6/21/2027 06/21/2017  Imm Admin: Tdap Vaccine    07/13/2009  Imm Admin: Tdap Vaccine                      Completed or No Longer Recommended       Influenza Vaccine (Series Information) Completed      09/27/2024  Outside Immunization: Influenza, High Dose    09/25/2023  Outside Immunization: Influenza Quad Adjuvanted    09/16/2022  Imm Admin: Influenza Vaccine Adult HD    09/27/2021  Imm Admin: Influenza Vaccine Adult HD    09/08/2020  Imm Admin: Influenza Vaccine Adult HD      Only the first 5 history entries have been loaded, but more history exists.              Zoster (Shingles) Vaccines (Series Information) Completed      02/05/2019  Imm Admin: Zoster Vaccine Recombinant (RZV) (SHINGRIX)    09/06/2018  Imm Admin: Zoster Vaccine Recombinant (RZV) (SHINGRIX)    09/30/2013  Outside Immunization: Varicella    10/15/2012  Outside Immunization: Varicella    10/20/2011  Outside Immunization: Varicella      Only the first 5 history entries have been loaded, but more history exists.              Hepatitis C Screening  Completed      06/02/2020  Hepatitis C Antibody component of HEP C VIRUS ANTIBODY              Pneumococcal Vaccine: 50+ Years (Series Information) Completed      05/03/2022  Imm Admin: Pneumococcal polysaccharide vaccine (PPSV-23)     2021  Imm Admin: Pneumococcal Conjugate Vaccine (Prevnar/PCV-13)              Hepatitis A Vaccine (Hep A) (Series Information) Aged Out      2018  Imm Admin: Hepatitis A Vaccine, Adult    2018  Imm Admin: Hepatitis A Vaccine, Adult              Hepatitis B Vaccine (Hep B) (Series Information) Aged Out      No completion history exists for this topic.              HPV Vaccines (Series Information) Aged Out     No completion history exists for this topic.              Polio Vaccine (Inactivated Polio) (Series Information) Aged Out     No completion history exists for this topic.              Meningococcal Immunization (Series Information) Aged Out     No completion history exists for this topic.              Meningococcal B Vaccine (Series Information) Aged Out     No completion history exists for this topic.              Cervical Cancer Screening  Discontinued        Frequency changed to Never automatically (Topic No Longer Applies)    10/23/2013  PAP IG (IMAGE GUIDED)                            Patient Care Team:  BASILIO Carrera as PCP - General (Family Medicine)        Social History[2]  Family History   Problem Relation Age of Onset    Cancer Mother         ovarian, age 52    Cancer Maternal Aunt         breast, dx age 33,  age 62    Breast Cancer Maternal Aunt     Cancer Maternal Aunt         ovarian cancer    Cancer Other          cousin, breast cancer age 40    Cancer Paternal Grandfather         stomach    Cancer Paternal Uncle         stomach     She  has a past medical history of Adenomatous polyp of colon (2009), Allergic rhinitis, CAD in native artery (2024), Carpal tunnel syndrome (2009), Diaphragmatic hernia without obstruction or gangrene (2023), Diverticulitis (2009), Gastroesophageal reflux disease with esophagitis without hemorrhage (2023), Hemorrhoids (2023), Hyperlipidemia LDL goal <70 (2009), Hypertension, Hypothyroidism,  "Impaired fasting glucose (04/22/2009), Mild mitral regurgitation (10/09/2024), NSTEMI (non-ST elevated myocardial infarction) ([facility]) (01/17/2023), Obesity (BMI 30-39.9) (07/13/2017), Pruritic dermatitis (04/22/2009), Pulmonary nodule (04/22/2009), Rheumatoid arthritis ([facility]), Sjogren's syndrome ([facility]), Syncope, and Vertigo (03/13/2024).   Past Surgical History[3]    Exam:   /68   Pulse (!) 53   Temp 36.7 °C (98 °F) (Temporal)   Ht 1.6 m (5' 3\")   Wt 101 kg (222 lb 7.1 oz)   SpO2 98%  Body mass index is 39.4 kg/m².    Hearing fair.    Dentition good  Physical Exam   Vitals reviewed.  Constitutional: oriented to person, place, and time. appears well-developed and well-nourished. No distress.   HENT: Head: Normocephalic and atraumatic. Bilateral tympanic membranes wnl w/o bulging.  Right Ear: External ear normal. Left Ear: External ear normal. Nose: Nose normal.  Mouth/Throat: Oropharynx is clear and moist. No oropharyngeal exudate. norma tm wnl. Eyes: Conjunctivae and EOM are normal. Pupils are equal, round, and reactive to light. Right eye exhibits no discharge. Left eye exhibits no discharge. No scleral icterus.    Neck: Normal range of motion. Neck supple. No JVD present.   Cardiovascular: Normal rate, regular rhythm, normal heart sounds and intact distal pulses.  Exam reveals no gallop and no friction rub.  No murmur heard.  No carotid bruits   Pulmonary/Chest: Effort normal and breath sounds normal. No stridor. No respiratory distress. no wheezes or rales. exhibits no tenderness.   Abdominal: Soft. Bowel sounds are normal. exhibits no distension and no mass. No tenderness. no rebound and no guarding.   Musculoskeletal: Normal range of motion. exhibits no edema or tenderness.  norma pedal pulses 2+.  Lymphadenopathy:  no cervical or supraclavicular adenopathy.   Neurological: alert and oriented to person, place, and time. has normal reflexes. displays normal reflexes. No cranial nerve deficit. exhibits normal " muscle tone. Coordination normal.   Skin: Skin is warm and dry. No rash noted. no diaphoresis. No erythema. No pallor.   Psychiatric: normal mood and affect. behavior is normal.       Subjective:     History of Present Illness  The patient is experiencing fatigue. She has consulted with a cardiologist, who expressed concerns about her thyroid function. However, her thyroid lab results are within normal limits. An echocardiogram is scheduled to further investigate the fatigue.    She has hyperlipidemia, which is stable and well-controlled with Crestor. She is encouraged to maintain a healthy diet and regular exercise. Hypertension is also stable and well-controlled with amlodipine and lisinopril.    There is a history of myocardial infarction and coronary artery disease. She is currently stable and under the care of a cardiologist. Follow-up for the echocardiogram and further evaluation related to fatigue will be conducted if necessary.    GERD is stable with omeprazole, and symptoms are mostly controlled through diet.     Herpes is stable with Acyclovir, and she reports recurrent shingles outbreaks if she stops the medication.     Diaphragmatic hernia causes occasional symptoms that are diet-controlled, with no further treatment needed at this time.    She has a history of adenomatous polyp and is followed by a gastroenterologist with no current symptoms.     Sjogren's syndrome is managed by endocrinology and rheumatology, and she is stable on Plaquenil. Rheumatoid arthritis is also stable on Plaquenil under the care of Dr. Degroot.    A pulmonary nodule has been monitored for several years and remains stable, requiring no further treatment.   Pruritic dermatitis has resolved with no current symptoms.   Diverticulitis causes occasional symptoms, which she manages with Bentyl and daily fiber intake.    She has a history of syncope with no current symptoms.   Hemorrhoids have resolved with no current symptoms.   Vertigo  causes occasional dizziness upon waking, which she manages by drinking adequate fluids.      Results  - Laboratory Studies:    - Thyroid lab: Within normal limits    Current medicines (including changes today)  Current Medications[4]  Current Medications[5]    She  has a past medical history of Adenomatous polyp of colon (04/22/2009), Allergic rhinitis, CAD in native artery (03/20/2024), Carpal tunnel syndrome (04/22/2009), Diaphragmatic hernia without obstruction or gangrene (04/26/2023), Diverticulitis (04/22/2009), Gastroesophageal reflux disease with esophagitis without hemorrhage (06/20/2023), Hemorrhoids (06/20/2023), Hyperlipidemia LDL goal <70 (04/22/2009), Hypertension, Hypothyroidism, Impaired fasting glucose (04/22/2009), Mild mitral regurgitation (10/09/2024), NSTEMI (non-ST elevated myocardial infarction) ([facility]) (01/17/2023), Obesity (BMI 30-39.9) (07/13/2017), Pruritic dermatitis (04/22/2009), Pulmonary nodule (04/22/2009), Rheumatoid arthritis ([facility]), Sjogren's syndrome ([facility]), Syncope, and Vertigo (03/13/2024).    Hemoglobin A1c:  Lab Results   Component Value Date/Time    HBA1C 6.0 (H) 05/16/2025 06:59 AM    HBA1C 5.9 (H) 11/20/2024 07:12 AM    HBA1C 6.1 (H) 03/08/2024 06:56 AM     Lipid Panel:  Lab Results   Component Value Date/Time    CHOLSTRLTOT 143 05/16/2025 0659    TRIGLYCERIDE 88 05/16/2025 0659    LDL 68 05/16/2025 0659    CHOLHDLRAT 3.7 12/22/2017 0819       Thyroid:  Lab Results   Component Value Date/Time    TSHULTRASEN 1.430 05/16/2025 0659     Lab Results   Component Value Date/Time    FREET4 1.68 05/16/2025 0659       Complete Blood Count:  Lab Results   Component Value Date/Time    WBC 6.6 03/19/2025 0703    RBC 4.98 03/19/2025 0703    HEMOGLOBIN 15.2 03/19/2025 0703    HEMATOCRIT 45.8 03/19/2025 0703    MCV 92.0 03/19/2025 0703    MCH 30.5 03/19/2025 0703    MCHC 33.2 03/19/2025 0703    RDW 46.6 03/19/2025 0703    MPV 9.6 03/19/2025 0703    LYMPHOCYTES 23.40 03/19/2025 0703    LYMPHS  1.55 03/19/2025 0703    MONOCYTES 9.80 03/19/2025 0703    MONOS 0.65 03/19/2025 0703    EOSINOPHILS 3.00 03/19/2025 0703    EOS 0.20 03/19/2025 0703    BASOPHILS 0.80 03/19/2025 0703    BASO 0.05 03/19/2025 0703    NRBC 0.00 03/19/2025 0703       Complete Metabolic Panel:  Lab Results   Component Value Date/Time    SODIUM 140 05/16/2025 06:59 AM    POTASSIUM 4.1 05/16/2025 06:59 AM    CHLORIDE 106 05/16/2025 06:59 AM    CO2 21 05/16/2025 06:59 AM    ANION 13.0 05/16/2025 06:59 AM    GLUCOSE 82 05/16/2025 06:59 AM    BUN 11 05/16/2025 06:59 AM    CREATININE 0.69 05/16/2025 06:59 AM    CREATININE 0.83 03/29/2011 07:30 AM    ASTSGOT 23 05/16/2025 06:59 AM    ALTSGPT 22 05/16/2025 06:59 AM    TBILIRUBIN 0.7 05/16/2025 06:59 AM    ALBUMIN 4.0 05/16/2025 06:59 AM    ALBUMIN 3.97 03/19/2025 07:03 AM    TOTPROTEIN 6.4 05/16/2025 06:59 AM    TOTPROTEIN 6.7 03/19/2025 07:03 AM    GLOBULIN 2.4 05/16/2025 06:59 AM    AGRATIO 1.7 05/16/2025 06:59 AM         Vitamin D:    Lab Results   Component Value Date/Time    25HYDROXY 39 05/16/2025 0659       GFR:    Lab Results   Component Value Date/Time    GFRCKD 94 05/16/2025 0659         ROS   Review of Systems   Constitutional: Negative.  Negative for fever, chills, weight loss, malaise/fatigue and diaphoresis.   HENT: Negative.  Negative for hearing loss, ear pain, nosebleeds, congestion, sore throat, neck pain, tinnitus and ear discharge.    Respiratory: Negative.  Negative for cough, hemoptysis, sputum production, shortness of breath, wheezing and stridor.    Cardiovascular: Negative.  Negative for chest pain, palpitations, orthopnea, claudication, leg swelling and PND.   Gastrointestinal: denies nausea, vomiting, diarrhea, constipation, heartburn, melena or hematochezia.  Genitourinary: Denies dysuria, hematuria, urinary incontinence, frequency or urgency.    Musculoskeletal: Negative.  Negative for myalgias and back pain.   Neurological: Negative.  Negative for dizziness,  "tingling, tremors, weakness and headaches.   Psych:  Denies depression, anxiety or insomnia.  All other systems reviewed and are negative.       Objective:     /68   Pulse (!) 53   Temp 36.7 °C (98 °F) (Temporal)   Ht 1.6 m (5' 3\")   Wt 101 kg (222 lb 7.1 oz)   SpO2 98%  Body mass index is 39.4 kg/m².   Physical Exam    Physical Exam   Vitals reviewed.  Constitutional: oriented to person, place, and time. appears well-developed and well-nourished. No distress.   Neck: No JVD present.  Cardiovascular: Normal rate, regular rhythm, normal heart sounds and intact distal pulses.  Exam reveals no gallop and no friction rub.  No murmur heard.  No carotid bruits.   Pulmonary/Chest: Effort normal and breath sounds normal. No stridor. No respiratory distress. no wheezes or rales. exhibits no tenderness.   Musculoskeletal: Normal range of motion. exhibits no edema. norma pedal pulses 2+.  Lymphadenopathy: no cervical or supraclavicular adenopathy.   Neurological: alert and oriented to person, place, and time. exhibits normal muscle tone. Coordination normal.   Skin: Skin is warm and dry. no diaphoresis.   Psychiatric: normal mood and affect. behavior is normal.       Assessment and Plan:   The following treatment plan was discussed    Assessment & Plan  1. Fatigue.  Continues to experience fatigue. Thyroid lab results are within normal limits. Scheduled for an echocardiogram to further investigate the cause of fatigue. Will follow up with cardiology for the echocardiogram and further evaluation if needed.    2. Hyperlipidemia.  Condition is stable and well-managed with Crestor. Advised to maintain a healthy diet and regular exercise regimen. Crestor prescription has been refilled. Encouraged to continue improving healthy diet and regular exercise.    3. Hypertension.  Condition is stable and well-controlled. Managed with amlodipine and lisinopril. Blood pressure remains within target range. Continue current " medications.    4. History of myocardial infarction and coronary artery disease.  Condition is currently stable. Under the care of a cardiologist. Will continue to follow up with cardiology for the echocardiogram and any further evaluation related to fatigue if necessary. No new symptoms reported.    5. Gastroesophageal reflux disease.  GERD is stable with omeprazole. Symptoms are largely managed through dietary control. No new symptoms reported. Continue current medication and dietary management.    6. Herpes zoster.  Stable on acyclovir, taking medication appropriately. Symptoms are related to herpes zoster; discontinuation leads to recurrent shingles outbreaks. Medications have been refilled. Continue current medication regimen.    7. Diaphragmatic hernia.  Experiences occasional symptoms managed through dietary control. No further treatment required at this time. No new symptoms reported. Continue dietary management.    8. History of adenomatous polyp.  Under the care of a gastroenterologist. Currently asymptomatic. No new symptoms reported. Continue regular follow-ups with gastroenterology.    9. Sjogren's syndrome.  Under the care of an endocrinologist and rheumatologist. Condition is stable on Plaquenil. No new symptoms reported. Continue current medication and follow-ups with specialists.    10. Rheumatoid arthritis.  Under the care of Dr. Joaquin. Condition is stable on Plaquenil. No new symptoms reported. Continue current medication and follow-ups with Dr. Joaquin.    11. Pulmonary nodule.  Monitored for several years and has remained stable. Asymptomatic. No further treatment required at this time. Continue regular monitoring as advised.    12. Pruritic dermatitis.  Condition has resolved. Currently asymptomatic. No new symptoms reported. No further treatment required.    13. History of diverticulitis.  Experiences occasional symptoms. Managed with Bentyl and daily fiber. No new symptoms reported. Continue  current management regimen.    14. Syncope.  Occurred in the past. Currently asymptomatic. No new symptoms reported. No further treatment required.    15. Hemorrhoids.  Condition has resolved. Currently asymptomatic. No new symptoms reported. No further treatment required.    16. Vertigo.  Experiences dizziness upon standing in the morning, occurring occasionally. Manages by maintaining adequate fluid intake. No new symptoms reported. Continue current management strategy.    Follow-up: Will follow up with cardiology for the echocardiogram and further evaluation if needed.      ORDERS:  1. Chronic fatigue (Primary)    - VITAMIN B12; Future  - FOLATE; Future  - FERRITIN; Future  - IRON/TOTAL IRON BIND; Future    2. Hyperlipidemia LDL goal <70    - rosuvastatin (CRESTOR) 10 MG Tab; Take 1 Tablet by mouth every evening.  Dispense: 100 Tablet; Refill: 3    3. Primary hypertension      4. H/O non-ST elevation myocardial infarction (NSTEMI)      5. Gastroesophageal reflux disease with esophagitis without hemorrhage      6. CAD in native artery      7. Herpes zoster with complication    - acyclovir (ZOVIRAX) 400 MG tablet; Take 1 Tablet by mouth 2 times a day.  Dispense: 200 Tablet; Refill: 3    8. Diaphragmatic hernia without obstruction or gangrene      9. Adenomatous polyp of colon, unspecified part of colon      10. Sjogren syndrome with inflammatory arthritis (HCC)      11. Rheumatoid arthritis involving multiple sites with positive rheumatoid factor (HCC)      12. Pulmonary nodule      13. Pruritic dermatitis      14. History of diverticulitis of colon      15. Syncope, unspecified syncope type      16. Hemorrhoids, unspecified hemorrhoid type      17. Vertigo      18. Encounter for screening mammogram for malignant neoplasm of breast    - MA-SCREENING MAMMO BILAT W/TOMOSYNTHESIS W/CAD; Future        Please note that this dictation was created using voice recognition software. I have made every reasonable attempt to  correct obvious errors, but I expect that there are errors of grammar and possibly content that I did not discover before finalizing the note.      Attestation      Verbal consent was acquired by the patient to use [employer] ambient listening note generation during this visit yes        Services suggested: No services needed at this time  Health Care Screening: Age-appropriate preventive services recommended by USPTF and ACIP covered by Medicare were discussed today. Services ordered if indicated and agreed upon by the patient.  Referrals offered: Community-based lifestyle interventions to reduce health risks and promote self-management and wellness, fall prevention, nutrition, physical activity, tobacco-use cessation, weight loss, and mental health services as per orders if indicated.    Discussion today about general wellness and lifestyle habits:    Prevent falls and reduce trip hazards; Cautioned about securing or removing rugs.  Have a working fire alarm and carbon monoxide detector;   Engage in regular physical activity and social activities     Follow-up: Return in about 1 year (around 5/21/2026), or or sooner if fatigue continues or lab is abn.            [1]   Current Outpatient Medications   Medication Sig Dispense Refill    acyclovir (ZOVIRAX) 400 MG tablet Take 1 Tablet by mouth 2 times a day. 200 Tablet 3    rosuvastatin (CRESTOR) 10 MG Tab Take 1 Tablet by mouth every evening. 100 Tablet 3    amLODIPine (NORVASC) 2.5 MG Tab TAKE 1 TABLET BY MOUTH DAILY 90 Tablet 2    hydroxychloroquine (PLAQUENIL) 200 MG Tab Take 1 Tablet by mouth 2 times a day. 180 Tablet 0    lisinopril (PRINIVIL) 20 MG Tab Take 1 Tablet by mouth 2 times a day. 200 Tablet 1    levothyroxine (SYNTHROID) 137 MCG Tab Take 1 Tablet by mouth every morning on an empty stomach. TAKE 1 TABLET BY MOUTH DAILY 100 Tablet 1    omeprazole (PRILOSEC) 40 MG delayed-release capsule Take 1 Capsule by mouth every day. 100 Capsule 1    Magnesium 400  MG Cap Take 400 mg by mouth every day.      ketoconazole (NIZORAL) 2 % Cream Apply 1 Application  topically 2 times a day. 30 g 1    scopolamine (TRANSDERM SCOP, 1.5 MG,) 1 mg/72hr PATCH 72 HR Place 1 Patch on the skin every 72 hours. 12 Patch 3    albuterol 108 (90 Base) MCG/ACT Aero Soln inhalation aerosol Inhale 2 Puffs every 6 hours as needed for Shortness of Breath. 8.5 g 1    dicyclomine (BENTYL) 10 MG Cap       aspirin 81 MG EC tablet Take 81 mg by mouth every day.      B Complex Cap Take 1 Cap by mouth every day. 30 Cap 0     No current facility-administered medications for this visit.   [2]   Social History  Tobacco Use    Smoking status: Never    Smokeless tobacco: Never   Vaping Use    Vaping status: Never Used   Substance Use Topics    Alcohol use: Yes     Comment: Occasionally    Drug use: No   [3]   Past Surgical History:  Procedure Laterality Date    HYSTERECTOMY ROBOTIC  2/19/2009    Performed by ARLEN ABDI at SURGERY [facility].  2/09 due to uterine fibroids and ovarian cyst    ARTHROSCOPY, KNEE  2005    left knee repair    METATARSAL HEAD RESECTION      metatarsal arthroplasty 2005    PRIMARY C SECTION      x2    UMBILICAL HERNIA REPAIR     [4]   Current Outpatient Medications   Medication Sig Dispense Refill    acyclovir (ZOVIRAX) 400 MG tablet Take 1 Tablet by mouth 2 times a day. 200 Tablet 3    rosuvastatin (CRESTOR) 10 MG Tab Take 1 Tablet by mouth every evening. 100 Tablet 3    amLODIPine (NORVASC) 2.5 MG Tab TAKE 1 TABLET BY MOUTH DAILY 90 Tablet 2    hydroxychloroquine (PLAQUENIL) 200 MG Tab Take 1 Tablet by mouth 2 times a day. 180 Tablet 0    lisinopril (PRINIVIL) 20 MG Tab Take 1 Tablet by mouth 2 times a day. 200 Tablet 1    levothyroxine (SYNTHROID) 137 MCG Tab Take 1 Tablet by mouth every morning on an empty stomach. TAKE 1 TABLET BY MOUTH DAILY 100 Tablet 1    omeprazole (PRILOSEC) 40 MG delayed-release capsule Take 1 Capsule by mouth every day. 100 Capsule 1    Magnesium 400  MG Cap Take 400 mg by mouth every day.      ketoconazole (NIZORAL) 2 % Cream Apply 1 Application  topically 2 times a day. 30 g 1    scopolamine (TRANSDERM SCOP, 1.5 MG,) 1 mg/72hr PATCH 72 HR Place 1 Patch on the skin every 72 hours. 12 Patch 3    albuterol 108 (90 Base) MCG/ACT Aero Soln inhalation aerosol Inhale 2 Puffs every 6 hours as needed for Shortness of Breath. 8.5 g 1    dicyclomine (BENTYL) 10 MG Cap       aspirin 81 MG EC tablet Take 81 mg by mouth every day.      B Complex Cap Take 1 Cap by mouth every day. 30 Cap 0     No current facility-administered medications for this visit.   [5]   Current Outpatient Medications   Medication Sig Dispense Refill    acyclovir (ZOVIRAX) 400 MG tablet Take 1 Tablet by mouth 2 times a day. 200 Tablet 3    rosuvastatin (CRESTOR) 10 MG Tab Take 1 Tablet by mouth every evening. 100 Tablet 3    amLODIPine (NORVASC) 2.5 MG Tab TAKE 1 TABLET BY MOUTH DAILY 90 Tablet 2    hydroxychloroquine (PLAQUENIL) 200 MG Tab Take 1 Tablet by mouth 2 times a day. 180 Tablet 0    lisinopril (PRINIVIL) 20 MG Tab Take 1 Tablet by mouth 2 times a day. 200 Tablet 1    levothyroxine (SYNTHROID) 137 MCG Tab Take 1 Tablet by mouth every morning on an empty stomach. TAKE 1 TABLET BY MOUTH DAILY 100 Tablet 1    omeprazole (PRILOSEC) 40 MG delayed-release capsule Take 1 Capsule by mouth every day. 100 Capsule 1    Magnesium 400 MG Cap Take 400 mg by mouth every day.      ketoconazole (NIZORAL) 2 % Cream Apply 1 Application  topically 2 times a day. 30 g 1    scopolamine (TRANSDERM SCOP, 1.5 MG,) 1 mg/72hr PATCH 72 HR Place 1 Patch on the skin every 72 hours. 12 Patch 3    albuterol 108 (90 Base) MCG/ACT Aero Soln inhalation aerosol Inhale 2 Puffs every 6 hours as needed for Shortness of Breath. 8.5 g 1    dicyclomine (BENTYL) 10 MG Cap       aspirin 81 MG EC tablet Take 81 mg by mouth every day.      B Complex Cap Take 1 Cap by mouth every day. 30 Cap 0     No current facility-administered  medications for this visit.

## 2025-05-21 NOTE — ASSESSMENT & PLAN NOTE
She has occas sx of that.  If she gets spasms she will tx w/bentyl.  Working well.  Also doing dialy fiber

## 2025-05-21 NOTE — ASSESSMENT & PLAN NOTE
She has sx if she eats late in day and lyes down.  Then she will have problems with the hernia.  She is now eating early in day to avoid sx.

## 2025-05-27 ENCOUNTER — HOSPITAL ENCOUNTER (OUTPATIENT)
Dept: LAB | Facility: MEDICAL CENTER | Age: 70
End: 2025-05-27
Attending: NURSE PRACTITIONER
Payer: MEDICARE

## 2025-05-27 DIAGNOSIS — R53.82 CHRONIC FATIGUE: ICD-10-CM

## 2025-05-27 LAB
FERRITIN SERPL-MCNC: 41.1 NG/ML (ref 10–291)
FOLATE SERPL-MCNC: 35.1 NG/ML
IRON SATN MFR SERPL: 14 % (ref 15–55)
IRON SERPL-MCNC: 45 UG/DL (ref 40–170)
TIBC SERPL-MCNC: 311 UG/DL (ref 250–450)
UIBC SERPL-MCNC: 266 UG/DL (ref 110–370)
VIT B12 SERPL-MCNC: 743 PG/ML (ref 211–911)

## 2025-05-27 PROCEDURE — 82746 ASSAY OF FOLIC ACID SERUM: CPT

## 2025-05-27 PROCEDURE — 82607 VITAMIN B-12: CPT

## 2025-05-27 PROCEDURE — 83540 ASSAY OF IRON: CPT | Mod: GA

## 2025-05-27 PROCEDURE — 36415 COLL VENOUS BLD VENIPUNCTURE: CPT

## 2025-05-27 PROCEDURE — 83550 IRON BINDING TEST: CPT | Mod: GA

## 2025-05-27 PROCEDURE — 82728 ASSAY OF FERRITIN: CPT | Mod: GA

## 2025-06-01 ENCOUNTER — RESULTS FOLLOW-UP (OUTPATIENT)
Dept: MEDICAL GROUP | Facility: MEDICAL CENTER | Age: 70
End: 2025-06-01

## 2025-06-02 ENCOUNTER — APPOINTMENT (OUTPATIENT)
Dept: MEDICAL GROUP | Facility: MEDICAL CENTER | Age: 70
End: 2025-06-02
Payer: MEDICARE

## 2025-07-03 ENCOUNTER — APPOINTMENT (OUTPATIENT)
Dept: MEDICAL GROUP | Facility: MEDICAL CENTER | Age: 70
End: 2025-07-03
Payer: MEDICARE

## 2025-07-08 ENCOUNTER — APPOINTMENT (OUTPATIENT)
Dept: URBAN - METROPOLITAN AREA CLINIC 35 | Facility: CLINIC | Age: 70
Setting detail: DERMATOLOGY
End: 2025-07-08

## 2025-07-08 DIAGNOSIS — Z71.89 OTHER SPECIFIED COUNSELING: ICD-10-CM

## 2025-07-08 DIAGNOSIS — L57.0 ACTINIC KERATOSIS: ICD-10-CM

## 2025-07-08 DIAGNOSIS — D22 MELANOCYTIC NEVI: ICD-10-CM

## 2025-07-08 DIAGNOSIS — L81.4 OTHER MELANIN HYPERPIGMENTATION: ICD-10-CM

## 2025-07-08 DIAGNOSIS — L82.1 OTHER SEBORRHEIC KERATOSIS: ICD-10-CM

## 2025-07-08 PROBLEM — D23.5 OTHER BENIGN NEOPLASM OF SKIN OF TRUNK: Status: ACTIVE | Noted: 2025-07-08

## 2025-07-08 PROBLEM — D22.5 MELANOCYTIC NEVI OF TRUNK: Status: ACTIVE | Noted: 2025-07-08

## 2025-07-08 PROCEDURE — ? COUNSELING

## 2025-07-08 PROCEDURE — ? ORDER FOR PHOTODYNAMIC THERAPY

## 2025-07-08 ASSESSMENT — LOCATION DETAILED DESCRIPTION DERM
LOCATION DETAILED: RIGHT MID-UPPER BACK
LOCATION DETAILED: RIGHT SUPERIOR UPPER BACK
LOCATION DETAILED: LEFT RIB CAGE

## 2025-07-08 ASSESSMENT — LOCATION SIMPLE DESCRIPTION DERM
LOCATION SIMPLE: ABDOMEN
LOCATION SIMPLE: RIGHT UPPER BACK

## 2025-07-08 ASSESSMENT — LOCATION ZONE DERM: LOCATION ZONE: TRUNK

## 2025-07-14 ENCOUNTER — OFFICE VISIT (OUTPATIENT)
Dept: URGENT CARE | Facility: CLINIC | Age: 70
End: 2025-07-14
Payer: MEDICARE

## 2025-07-14 VITALS
OXYGEN SATURATION: 98 % | SYSTOLIC BLOOD PRESSURE: 112 MMHG | TEMPERATURE: 97.9 F | DIASTOLIC BLOOD PRESSURE: 70 MMHG | HEIGHT: 65 IN | HEART RATE: 58 BPM | BODY MASS INDEX: 36.65 KG/M2 | RESPIRATION RATE: 14 BRPM | WEIGHT: 220 LBS

## 2025-07-14 DIAGNOSIS — T63.461A WASP STING, ACCIDENTAL OR UNINTENTIONAL, INITIAL ENCOUNTER: Primary | ICD-10-CM

## 2025-07-14 PROCEDURE — 3074F SYST BP LT 130 MM HG: CPT | Performed by: NURSE PRACTITIONER

## 2025-07-14 PROCEDURE — 3078F DIAST BP <80 MM HG: CPT | Performed by: NURSE PRACTITIONER

## 2025-07-14 PROCEDURE — 99213 OFFICE O/P EST LOW 20 MIN: CPT | Performed by: NURSE PRACTITIONER

## 2025-07-14 RX ORDER — CEPHALEXIN 500 MG/1
500 CAPSULE ORAL 4 TIMES DAILY
Qty: 28 CAPSULE | Refills: 0 | Status: SHIPPED | OUTPATIENT
Start: 2025-07-14 | End: 2025-07-21

## 2025-07-14 RX ORDER — CETIRIZINE HYDROCHLORIDE 10 MG/1
10 TABLET ORAL ONCE
Status: COMPLETED | OUTPATIENT
Start: 2025-07-14 | End: 2025-07-14

## 2025-07-14 RX ADMIN — CETIRIZINE HYDROCHLORIDE 10 MG: 10 TABLET ORAL at 09:55

## 2025-07-14 ASSESSMENT — ENCOUNTER SYMPTOMS
FEVER: 0
WHEEZING: 0
SHORTNESS OF BREATH: 0

## 2025-07-14 ASSESSMENT — FIBROSIS 4 INDEX: FIB4 SCORE: 1.35

## 2025-07-14 NOTE — PROGRESS NOTES
Subjective:     Elizabeth Nuñez is a 70 y.o. female who presents for Insect Bite (Stung yesterday/ Wasp sting on L arm / arm swelling / itchy)      Stung yesterday on her upper left arm by a wasp. No SOB or oral swelling. States the area is itchy, swollen, and hot. Reports pain in axillary lymph area. Has been applying topical hydrocortisone and benadryl. Also took Tylenol.        Other  This is a new problem. The current episode started yesterday. The problem has been gradually worsening. Pertinent negatives include no fever.         Past Medical History[1]    Past Surgical History[2]    Social History     Socioeconomic History    Marital status:      Spouse name: Not on file    Number of children: Not on file    Years of education: Not on file    Highest education level: Doctorate   Occupational History    Not on file   Tobacco Use    Smoking status: Never    Smokeless tobacco: Never   Vaping Use    Vaping status: Never Used   Substance and Sexual Activity    Alcohol use: Yes     Comment: Occasionally    Drug use: No    Sexual activity: Not on file   Other Topics Concern    Not on file   Social History Narrative    Not on file     Social Drivers of Health     Financial Resource Strain: Low Risk  (5/18/2025)    Overall Financial Resource Strain (CARDIA)     Difficulty of Paying Living Expenses: Not hard at all   Food Insecurity: No Food Insecurity (5/18/2025)    Hunger Vital Sign     Worried About Running Out of Food in the Last Year: Never true     Ran Out of Food in the Last Year: Never true   Transportation Needs: No Transportation Needs (5/18/2025)    PRAPARE - Transportation     Lack of Transportation (Medical): No     Lack of Transportation (Non-Medical): No   Physical Activity: Insufficiently Active (5/18/2025)    Exercise Vital Sign     Days of Exercise per Week: 4 days     Minutes of Exercise per Session: 30 min   Stress: No Stress Concern Present (5/18/2025)    [place] [employer]  "Occupational Health - Occupational Stress Questionnaire     Feeling of Stress : Not at all   Social Connections: Moderately Isolated (2025)    Social Connection and Isolation Panel [NHANES]     Frequency of Communication with Friends and Family: More than three times a week     Frequency of Social Gatherings with Friends and Family: Three times a week     Attends Zoroastrian Services: Never     Active Member of Clubs or Organizations: No     Attends Club or Organization Meetings: Never     Marital Status:    Intimate Partner Violence: Not on file   Housing Stability: Low Risk  (2025)    Housing Stability Vital Sign     Unable to Pay for Housing in the Last Year: No     Number of Times Moved in the Last Year: 0     Homeless in the Last Year: No        Family History   Problem Relation Age of Onset    Cancer Mother         ovarian, age 52    Cancer Maternal Aunt         breast, dx age 33,  age 62    Breast Cancer Maternal Aunt     Cancer Maternal Aunt         ovarian cancer    Cancer Other          cousin, breast cancer age 40    Cancer Paternal Grandfather         stomach    Cancer Paternal Uncle         stomach        Allergies[3]    Review of Systems   Constitutional:  Negative for fever.   Respiratory:  Negative for shortness of breath and wheezing.    Skin:  Positive for itching.   All other systems reviewed and are negative.       Objective:   /70   Pulse (!) 58   Resp 14   Ht 1.651 m (5' 5\")   Wt 99.8 kg (220 lb)   LMP 2010   SpO2 98%   BMI 36.61 kg/m²     Physical Exam  Vitals reviewed.   Constitutional:       General: She is not in acute distress.     Appearance: She is not toxic-appearing.   Cardiovascular:      Rate and Rhythm: Normal rate.   Pulmonary:      Effort: Pulmonary effort is normal. No respiratory distress.   Musculoskeletal:         General: Swelling present.      Left wrist: Normal pulse.      Left hand: Normal strength. Normal capillary refill.   Skin:     " Capillary Refill: Capillary refill takes less than 2 seconds.      Findings: Erythema present.             Comments: Localized circular induration and erythema, 6cm. No red streaking. Distal neurovascular is intact.     Neurological:      Mental Status: She is alert and oriented to person, place, and time.         Assessment/Plan:   1. Wasp sting, accidental or unintentional, initial encounter  - cetirizine (ZyrTEC) tablet 10 mg  - cephALEXin (KEFLEX) 500 MG Cap; Take 1 Capsule by mouth 4 times a day for 7 days.  Dispense: 28 Capsule; Refill: 0    -Cold compresses.  -Elevate extremity for swelling.  -Cortisone cream  -Over the counter antihistamine for itching, cetirizine (zyrtec).  -Nonsteroidal anti-inflammatory drugs (NSAIDs) for pain, Ibuprofen as directed.  -Watch for any signs of infection (redness, pus, pain, increased swelling or fever).     Follow up for signs of infection, red streaking, shortness of breath or wheezing, oral or facial swelling, rash, joint pain, or any other concerns.    Discussed initial histamine reaction and expectations. Was given a contingent oral antibiotic for S&S of secondary cellulitis. Patient declined an oral corticosteroid.     Differential diagnosis, natural history, supportive care, and indications for immediate follow-up discussed.           [1]   Past Medical History:  Diagnosis Date    Adenomatous polyp of colon 04/22/2009    Allergic rhinitis     CAD in native artery 03/20/2024    Carpal tunnel syndrome 04/22/2009    EMG 11/07    Diaphragmatic hernia without obstruction or gangrene 04/26/2023    Diverticulitis 04/22/2009    recurrent.  CT scan 10/07 negative    Gastroesophageal reflux disease with esophagitis without hemorrhage 06/20/2023    Hemorrhoids 06/20/2023    Hyperlipidemia LDL goal <70 04/22/2009 11/07 chol 203, trig 139, hdl 38, ldl 137 12/09 chol 193,trig 98,hdl 46,ldl 127 10/11 chol 183,trig 97,hdl 35,ldl 129 5/12 chol 168,trig 111,hdl 46,ldl 100 on zocor  10 mg    Hypertension     Hypothyroidism     Impaired fasting glucose 04/22/2009    Mild mitral regurgitation 10/09/2024    NSTEMI (non-ST elevated myocardial infarction) (HCC) 01/17/2023    Obesity (BMI 30-39.9) 07/13/2017    Pruritic dermatitis 04/22/2009    followed by derm    Pulmonary nodule 04/22/2009    Last CT chest 10/10 showed stable nodules with no further w/u needed    Rheumatoid arthritis (HCC)     Sjogren's syndrome (HCC)     Syncope     unknown etiology.  cardiac w/u neg    Vertigo 03/13/2024   [2]   Past Surgical History:  Procedure Laterality Date    HYSTERECTOMY ROBOTIC  2/19/2009    Performed by ARLEN ABDI at SURGERY [facility].  2/09 due to uterine fibroids and ovarian cyst    ARTHROSCOPY, KNEE  2005    left knee repair    METATARSAL HEAD RESECTION      metatarsal arthroplasty 2005    PRIMARY C SECTION      x2    UMBILICAL HERNIA REPAIR     [3]   Allergies  Allergen Reactions    Misc. Sulfonamide Containing Compounds Hives and Itching    Morphine Hives and Itching    Sulfa Drugs Hives, Itching and Rash    Quinine Hives     Hives  Hives  Hives    Shellfish Allergy Nausea    Shellfish-Derived Products

## 2025-07-14 NOTE — PATIENT INSTRUCTIONS
-Cold compresses.  -Elevate extremity for swelling.   -Cortisone cream  -Over the counter antihistamine for itching, cetirizine (zyrtec).  -Nonsteroidal anti-inflammatory drugs (NSAIDs) for pain, Ibuprofen as directed.  -Watch for any signs of infection (redness, pus, pain, increased swelling or fever).     Follow up for signs of infection, red streaking, shortness of breath or wheezing, oral or facial swelling, rash, joint pain, or any other concerns.

## 2025-07-16 ENCOUNTER — HOSPITAL ENCOUNTER (OUTPATIENT)
Dept: RADIOLOGY | Facility: MEDICAL CENTER | Age: 70
End: 2025-07-16
Attending: NURSE PRACTITIONER
Payer: MEDICARE

## 2025-07-16 DIAGNOSIS — I10 ESSENTIAL HYPERTENSION: ICD-10-CM

## 2025-07-16 DIAGNOSIS — Z12.31 ENCOUNTER FOR SCREENING MAMMOGRAM FOR MALIGNANT NEOPLASM OF BREAST: ICD-10-CM

## 2025-07-16 DIAGNOSIS — E03.9 ACQUIRED HYPOTHYROIDISM: ICD-10-CM

## 2025-07-16 PROCEDURE — 77063 BREAST TOMOSYNTHESIS BI: CPT

## 2025-07-16 NOTE — TELEPHONE ENCOUNTER
Received request via: Pharmacy    Was the patient seen in the last year in this department? Yes    Does the patient have an active prescription (recently filled or refills available) for medication(s) requested? No    Pharmacy Name: [facility]    Does the patient have half-way Plus and need 100-day supply? (This applies to ALL medications) Patient does not have SCP

## 2025-07-19 RX ORDER — LISINOPRIL 20 MG/1
20 TABLET ORAL 2 TIMES DAILY
Qty: 180 TABLET | Refills: 0 | Status: SHIPPED | OUTPATIENT
Start: 2025-07-19

## 2025-07-19 RX ORDER — LEVOTHYROXINE SODIUM 137 UG/1
137 TABLET ORAL
Qty: 90 TABLET | Refills: 1 | Status: SHIPPED | OUTPATIENT
Start: 2025-07-19

## 2025-08-26 ENCOUNTER — OFFICE VISIT (OUTPATIENT)
Dept: URGENT CARE | Facility: CLINIC | Age: 70
End: 2025-08-26
Payer: MEDICARE

## 2025-08-26 VITALS
WEIGHT: 220 LBS | RESPIRATION RATE: 16 BRPM | DIASTOLIC BLOOD PRESSURE: 76 MMHG | HEIGHT: 65 IN | OXYGEN SATURATION: 97 % | TEMPERATURE: 98.7 F | HEART RATE: 89 BPM | SYSTOLIC BLOOD PRESSURE: 132 MMHG | BODY MASS INDEX: 36.65 KG/M2

## 2025-08-26 DIAGNOSIS — L03.114 LEFT ARM CELLULITIS: ICD-10-CM

## 2025-08-26 DIAGNOSIS — T63.461A YELLOW JACKET STING, ACCIDENTAL OR UNINTENTIONAL, INITIAL ENCOUNTER: ICD-10-CM

## 2025-08-26 RX ORDER — HYDROXYZINE HYDROCHLORIDE 25 MG/1
25 TABLET, FILM COATED ORAL 2 TIMES DAILY PRN
Qty: 15 TABLET | Refills: 0 | Status: SHIPPED | OUTPATIENT
Start: 2025-08-26

## 2025-08-26 RX ORDER — METFORMIN HYDROCHLORIDE 500 MG/1
500 TABLET, EXTENDED RELEASE ORAL DAILY
COMMUNITY
Start: 2025-07-30 | End: 2026-07-30

## 2025-08-26 RX ORDER — AMOXICILLIN 500 MG/1
500 CAPSULE ORAL 3 TIMES DAILY
Qty: 15 CAPSULE | Refills: 0 | Status: SHIPPED | OUTPATIENT
Start: 2025-08-26 | End: 2025-08-31

## 2025-08-26 RX ORDER — DEXAMETHASONE SODIUM PHOSPHATE 10 MG/ML
10 INJECTION, SOLUTION INTRA-ARTICULAR; INTRALESIONAL; INTRAMUSCULAR; INTRAVENOUS; SOFT TISSUE ONCE
Status: COMPLETED | OUTPATIENT
Start: 2025-08-26 | End: 2025-08-26

## 2025-08-26 RX ADMIN — DEXAMETHASONE SODIUM PHOSPHATE 10 MG: 10 INJECTION, SOLUTION INTRA-ARTICULAR; INTRALESIONAL; INTRAMUSCULAR; INTRAVENOUS; SOFT TISSUE at 08:58

## 2025-08-26 ASSESSMENT — ENCOUNTER SYMPTOMS
FEVER: 0
VOMITING: 0
WHEEZING: 0
COUGH: 0
PALPITATIONS: 0
NAUSEA: 0
ABDOMINAL PAIN: 0
CONSTIPATION: 0
CHILLS: 0
SHORTNESS OF BREATH: 0
DIARRHEA: 0

## 2025-08-26 ASSESSMENT — FIBROSIS 4 INDEX: FIB4 SCORE: 1.35

## 2025-09-03 ENCOUNTER — APPOINTMENT (OUTPATIENT)
Facility: MEDICAL CENTER | Age: 70
End: 2025-09-03
Payer: MEDICARE